# Patient Record
Sex: FEMALE | Race: OTHER | HISPANIC OR LATINO | Employment: OTHER | URBAN - METROPOLITAN AREA
[De-identification: names, ages, dates, MRNs, and addresses within clinical notes are randomized per-mention and may not be internally consistent; named-entity substitution may affect disease eponyms.]

---

## 2018-07-16 ENCOUNTER — OFFICE VISIT (OUTPATIENT)
Dept: OTHER | Facility: IMAGING CENTER | Age: 63
End: 2018-07-16
Payer: COMMERCIAL

## 2018-07-16 VITALS
HEIGHT: 66 IN | HEART RATE: 76 BPM | RESPIRATION RATE: 14 BRPM | WEIGHT: 151.24 LBS | OXYGEN SATURATION: 97 % | DIASTOLIC BLOOD PRESSURE: 68 MMHG | TEMPERATURE: 98.7 F | SYSTOLIC BLOOD PRESSURE: 118 MMHG | BODY MASS INDEX: 24.31 KG/M2

## 2018-07-16 DIAGNOSIS — E78.5 DYSLIPIDEMIA: ICD-10-CM

## 2018-07-16 DIAGNOSIS — E53.8 B12 DEFICIENCY: ICD-10-CM

## 2018-07-16 DIAGNOSIS — I83.92 VARICOSE VEINS OF LEFT LOWER EXTREMITY: ICD-10-CM

## 2018-07-16 DIAGNOSIS — Z83.3 FAMILY HISTORY OF DIABETES MELLITUS IN MOTHER: ICD-10-CM

## 2018-07-16 DIAGNOSIS — Z00.00 HEALTHCARE MAINTENANCE: ICD-10-CM

## 2018-07-16 DIAGNOSIS — E03.9 HYPOTHYROIDISM, UNSPECIFIED TYPE: ICD-10-CM

## 2018-07-16 DIAGNOSIS — E55.9 VITAMIN D DEFICIENCY: ICD-10-CM

## 2018-07-16 DIAGNOSIS — E89.40 POSTSURGICAL MENOPAUSE: ICD-10-CM

## 2018-07-16 DIAGNOSIS — R09.89 DECREASED PEDAL PULSES: ICD-10-CM

## 2018-07-16 DIAGNOSIS — Z79.890 CURRENT LONG-TERM USE OF POSTMENOPAUSAL HORMONE REPLACEMENT THERAPY: ICD-10-CM

## 2018-07-16 PROCEDURE — 99205 OFFICE O/P NEW HI 60 MIN: CPT | Performed by: INTERNAL MEDICINE

## 2018-07-16 RX ORDER — THYROID,PORK 90 MG
TABLET ORAL
Refills: 1 | COMMUNITY
Start: 2018-06-04 | End: 2018-07-16 | Stop reason: SDUPTHER

## 2018-07-16 RX ORDER — ESTRADIOL 0.07 MG/D
1 PATCH TRANSDERMAL
Qty: 4 PATCH | Refills: 0 | Status: SHIPPED | OUTPATIENT
Start: 2018-07-16 | End: 2018-08-19 | Stop reason: SDUPTHER

## 2018-07-16 RX ORDER — CYANOCOBALAMIN (VITAMIN B-12) 500 MCG
LOZENGE ORAL
COMMUNITY
End: 2023-11-03

## 2018-07-16 RX ORDER — CYANOCOBALAMIN 1000 UG/ML
1000 INJECTION, SOLUTION INTRAMUSCULAR; SUBCUTANEOUS
COMMUNITY
End: 2021-10-13

## 2018-07-16 RX ORDER — GINSENG 100 MG
CAPSULE ORAL
COMMUNITY
End: 2021-06-08

## 2018-07-16 RX ORDER — THYROID,PORK 90 MG
90 TABLET ORAL DAILY
Qty: 30 TAB | Refills: 1 | Status: SHIPPED | OUTPATIENT
Start: 2018-07-16 | End: 2018-12-26 | Stop reason: SDUPTHER

## 2018-07-16 RX ORDER — ESTRADIOL 0.1 MG/D
1 FILM, EXTENDED RELEASE TRANSDERMAL
COMMUNITY
End: 2018-07-16

## 2018-07-16 ASSESSMENT — PATIENT HEALTH QUESTIONNAIRE - PHQ9
5. POOR APPETITE OR OVEREATING: 3 - NEARLY EVERY DAY
SUM OF ALL RESPONSES TO PHQ QUESTIONS 1-9: 16
CLINICAL INTERPRETATION OF PHQ2 SCORE: 4

## 2018-07-16 ASSESSMENT — PAIN SCALES - GENERAL: PAINLEVEL: NO PAIN

## 2018-07-16 NOTE — ASSESSMENT & PLAN NOTE
There are no preventive care reminders to display for this patient.  Mammogram: 2017  PAP N/A (hysterectomy)  DEXA 3 yrs ago (remember it was normal)  Colonoscopy - may be due (review records)  Immunizations: zoster 8 yrs ago, annual flu    Lifestyle  Exercise: Outdoor walks w/ spouse/sister. Some hiking (just started). Healthy posture DVD (yoga/pilates) at home.  Diet: Avoids sweets due to family hx of DM2. Breakfast - 2 breakfast cookies w/ 2TB PB. Lunch - protein, fruit smoothie w/ rice or almond milk. Dinner Pizza + salad 1 wk/ chicken breast with veges/taco/turkey sandwhiches/chicken dakota salad/pastas. Snaks - chips/dips/fruits: apples, nectarines, cherries, mangoes. Beverates - water, tea, wine 1-2 w/ dinner.  Sleep: Sleeps well 5 of 7 nights. Wakes up feeling tired most mornings... Like she wants to sleep more. Doesn't go to bed early (before 10pm) but thinks she should. No snoring, takes 1-2 hr daytime naps when she is tired. Lower energy in the last year.  Relaxation: Hike/outdoor walks, oil painting, reading, work-out (not much motivations in the last year or so), travel.  Relationships:  Fairly non-stressful at home, spouse retired, they love to travel abroad. Plans to attend [employer] with sister regularly. Moved to [place] in May to be closer to her sister and brother-in-law - has been wonderful to see her often. Retired and enjoy free days to do what she pleases - she is thankful and grateful. Hopefully getting a dog this year.  Current Stressors:  not having energy that she once had, 10 lbs overweight, not having discipline to take better care of herself...not feeling motivated! Should get up earlier.  Prior experience with complementary therapies: Takes biotin, vit c w/ rosehips, vit D, vit E, B-complex w/ IF, Ca/Mg/Zinc, Glycine, Evening primrose oil, bilberry, zinc, ashwaganda. Bioidentical hormones.

## 2018-07-16 NOTE — ASSESSMENT & PLAN NOTE
Patient reports history of low vitamin D.  She is currently taking 5000 international units daily.

## 2018-07-16 NOTE — LETTER
[employer]  Alena Pelaez D.O.  [address] [facility] [address]  [place] [address]  Fax: [phone]   Authorization for Release/Disclosure of   Protected Health Information   Name: LIDIA DIOR : 1955 SSN: [id number]   Address: [address] [place] [address]  [place] [address] Phone:    [phone] (home)    I authorize the entity listed below to release/disclose the PHI below to:   [facility]/Alena Pelaez D.O. and Alena Pelaez D.O.   Provider or Entity Name:     Address   City, State, Zip   Phone:      Fax:     Reason for request: continuity of care   Information to be released:    [  ] LAST COLONOSCOPY,  including any PATH REPORT and follow-up  [  ] LAST FIT/COLOGUARD RESULT [  ] LAST DEXA  [  ] LAST MAMMOGRAM  [  ] LAST PAP  [  ] LAST LABS [  ] RETINA EXAM REPORT  [  ] IMMUNIZATION RECORDS  [ x] Release all info      [  ] Check here and initial the line next to each item to release ALL health information INCLUDING  _____ Care and treatment for drug and / or alcohol abuse  _____ HIV testing, infection status, or AIDS  _____ Genetic Testing    DATES OF SERVICE OR TIME PERIOD TO BE DISCLOSED: _____________  I understand and acknowledge that:  * This Authorization may be revoked at any time by you in writing, except if your health information has already been used or disclosed.  * Your health information that will be used or disclosed as a result of you signing this authorization could be re-disclosed by the recipient. If this occurs, your re-disclosed health information may no longer be protected by State or Federal laws.  * You may refuse to sign this Authorization. Your refusal will not affect your ability to obtain treatment.  * This Authorization becomes effective upon signing and will  on (date) __________.      If no date is indicated, this Authorization will  one (1) year from the signature date.    Name: Lidia Dior    Signature:   Date:     2018       PLEASE FAX  REQUESTED RECORDS BACK TO: [phone]

## 2018-07-16 NOTE — PROGRESS NOTES
Chief Complaint   Patient presents with   • Leg Pain     x 6 months; daily, tightness    • Pelvic Pain     x 6 months; aching daily, left side   • Depression   • Orders Needed     labs, dexa, US     Lidia Dior is a 62 y.o. female who usually sees No primary care provider on file. presents today to establish care at [facility] and to discuss leg pain, thyroid condition, hormone replacement.    HPI:  Hypothyroidism  Chronic condition (since 2008). Taking medicine as directed on empty stomach with water and waits at last 30 minutes to consume other food or beverage.  Denies palpitations, skin changes, temperature intolerance, changes in bowel habits. Thinning hair (runs in the family - mother), ridges on nails but not brittle. Currently taking [place] thyroid (since 2008). Has never been on synthetic T4. Family history in sister. Unknown if thyroid antibodies have been checked.    Healthcare maintenance  There are no preventive care reminders to display for this patient.  Mammogram: 2017  PAP N/A (hysterectomy)  DEXA 3 yrs ago (remember it was normal)  Colonoscopy - may be due (review records)  Immunizations: zoster 8 yrs ago, annual flu    Lifestyle  Exercise: Outdoor walks w/ spouse/sister. Some hiking (just started). Healthy posture DVD (yoga/pilates) at home.  Diet: Avoids sweets due to family hx of DM2. Breakfast - 2 breakfast cookies w/ 2TB PB. Lunch - protein, fruit smoothie w/ rice or almond milk. Dinner Pizza + salad 1 wk/ chicken breast with veges/taco/turkey sandwhiches/chicken dakota salad/pastas. Snaks - chips/dips/fruits: apples, nectarines, cherries, mangoes. Beverates - water, tea, wine 1-2 w/ dinner.  Sleep: Sleeps well 5 of 7 nights. Wakes up feeling tired most mornings... Like she wants to sleep more. Doesn't go to bed early (before 10pm) but thinks she should. No snoring, takes 1-2 hr daytime naps when she is tired. Lower energy in the last year.  Relaxation: Hike/outdoor walks, oil painting, reading,  work-out (not much motivations in the last year or so), travel.  Relationships:  Fairly non-stressful at home, spouse retired, they love to travel abroad. Plans to attend [employer] with sister regularly. Moved to [place] in May to be closer to her sister and brother-in-law - has been wonderful to see her often. Retired and enjoy free days to do what she pleases - she is thankful and grateful. Hopefully getting a dog this year.  Current Stressors:  not having energy that she once had, 10 lbs overweight, not having discipline to take better care of herself...not feeling motivated! Should get up earlier.  Prior experience with complementary therapies: Takes biotin, vit c w/ rosehips, vit D, vit E, B-complex w/ IF, Ca/Mg/Zinc, Glycine, Evening primrose oil, bilberry, zinc, ashwaganda. Bioidentical hormones.    Vitamin D deficiency  Patient reports history of low vitamin D.  She is currently taking 5000 international units daily.    B12 deficiency  Had been getting B12 injections for low vitamin B12 1000mcg/ml since 2016. Has some B12 in her B-complex vitamin. Some numbness in feet.     Postsurgical menopause  Patient had partial hysterectomy due to history of fibroids and menorrhagia.  She developed severe hot flashes and mood instability/irritability about 10 years ago.  Patient was started on bioidentical hormone therapy with improvement in her symptoms.  She notes that Dr. Burns had switched her to conventional estrogen patches about 8 years ago due to inability to normalize her lab values.  She is currently on estradiol 0.1 mg twice per week, progesterone 200 mg in the evening, and a compound testosterone cream. No personal history of breast cancer, CHD,  previous venous thromboembolic event or stroke, or active liver disease. Patient is a non-smoker.    Patient reports history of bilateral leg pain in the last few months - described as tightness and cold. Notes tops of her feet are occasionally numb. No swelling. No pain  with walking. Also has a slight darkening of the skin in her medial ankle.  Wears opened toed shoes/sandals at home. Hx of varicose veins and sclerotherapy of left lower extremity. Wears compression hose if on plane or sitting for long periods of time.     Current medicines (including changes today)  Current Outpatient Prescriptions   Medication Sig Dispense Refill   • Progesterone Micronized (PROGESTERONE PO) Take 200 mg by mouth every evening.     • EC-RX TESTOSTERONE TD Apply  to skin as directed.     • cyanocobalamin (VITAMIN B-12) 1000 MCG/ML Solution 1,000 mcg by Intramuscular route every 30 days.     • BIOTIN PO Take 600 mcg by mouth.     • Ascorbic Acid (VITAMIN C-CANDIDA HIPS) 1000 MG Tab Take  by mouth.     • Cholecalciferol (VITAMIN D-3) 5000 units Tab Take  by mouth.     • Vitamin E 400 UNIT Tab Take  by mouth.     • B Complex Vitamins (B COMPLEX PO) Take  by mouth.     • CALCIUM-MAGNESIUM-ZINC PO Take  by mouth.     • GLYCINE PO Take  by mouth.     • ADVANCED EVENING PRIMROSE OIL PO Take  by mouth.     • Bilberry, Vaccinium myrtillus, (BILBERRY PO) Take  by mouth.     • ASHWAGANDHA PO Take  by mouth.     • Zinc 50 MG Tab Take  by mouth.     • estradiol (CLIMARA) 0.075 MG/24HR PATCH WEEKLY Apply 1 Patch to skin as directed every 7 days. 4 Patch 0   • ARMOUR THYROID 90 MG Tab Take 1 Tab by mouth every day. 30 Tab 1     No current facility-administered medications for this visit.      She  has a past medical history of Dyslipidemia; Ovarian cyst (10/2005); Renal cyst (9/215); and Renal cyst (10/2008).  She  has a past surgical history that includes hysterectomy laparoscopy (10/1995); other orthopedic surgery (Right, 01/26/2001); abbey by laparoscopy (07/31/2003); and other surgical procedure (Left, 10/09/2006).  Social History   Substance Use Topics   • Smoking status: Never Smoker   • Smokeless tobacco: Never Used   • Alcohol use 1.2 oz/week     2 Glasses of wine per week      Comment: glass of wine 1-2x /wk  "    Family History   Problem Relation Age of Onset   • Diabetes Mother      Adult onset, on insulin   • Arthritis Mother      RA   • Heart Disease Father      CHF   • Diabetes Sister      borderline   • Thyroid Sister      hypothyroid   • Diabetes Sister      borderline   • Diabetes Sister    • Diabetes Sister    • Diabetes Sister    • Other Son      AV valve repair - possibly genetic   • Hypertension Son      No family status information on file.     Social History     Social History Narrative   • No narrative on file     ROS  Constitutional: Negative for fever, chills, weight loss and positive malaise/fatigue.   HENT: Negative for ear pain, nosebleeds, congestion, sore throat and neck pain.    Eyes: Negative for blurred vision.   Respiratory: Negative for cough, sputum production, shortness of breath and wheezing.    Cardiovascular: Negative for chest pain, palpitations, orthopnea and leg swelling. Varicose veins as per hpi.  Gastrointestinal: Negative for heartburn, nausea, vomiting and abdominal pain.   Genitourinary: Negative for dysuria, urgency and frequency.   Musculoskeletal: Negative for myalgias, back pain and joint pain.   Skin: Negative for rash and itching. Darkening of medial ankle skin.  Neurological: Negative for dizziness, tingling, tremors, sensory change, focal weakness and headaches.   Endo/Heme/Allergies: Does not bruise/bleed easily.   Psychiatric/Behavioral: Negative for depression, anxiety, or memory loss.     All other systems reviewed and are negative except as in HPI.     Objective:   Blood pressure 118/68, pulse 76, temperature 37.1 °C (98.7 °F), resp. rate 14, height 1.676 m (5' 6\"), weight 68.6 kg (151 lb 3.8 oz), SpO2 97 %. Body mass index is 24.41 kg/m².  Physical Exam:  Constitutional: Alert, no distress.  Skin: Warm, dry, good turgor, no rashes in visible areas.  Eye: Equal, round and reactive, conjunctiva clear, lids normal.  Neck: Trachea midline, no masses, no " thyromegaly.  Respiratory: Unlabored respiratory effort, lungs clear to auscultation, no wheezes, no ronchi.  Cardiovascular: Normal S1, S2, no murmur, no edema.  Ext: no edema, pedal pulses are diminished, feet are cold, no discoloration of toes - slight darkening of area on medial ankles. Unable to assess capillary refill - nails are painted.  Psych: Alert and oriented x3, normal affect and mood.    Assessment and Plan:   The following treatment plan was discussed  1. Hypothyroidism, unspecified type  Recheck thyroid labs. TPO/antithyroglobulin antibodies have not been checked and could affect future management. Discussed timing of lab and avoiding biotin which could affect lab results.  - TSH; Future  - FREE THYROXINE; Future  - T3 FREE; Future  - THYROID PEROXIDASE  (TPO) AB; Future  - ANTITHYROGLOBULIN AB; Future  - ARMOUR THYROID 90 MG Tab; Take 1 Tab by mouth every day.  Dispense: 30 Tab; Refill: 1    2. Postsurgical menopause  On hormone replacement therapy. Discussed considering discontinuation of therapy as duration of use is generally not more than five years or not beyond age 60 years. Discussed adverse risks associated with HRT and similarly with bioidentical hormones. Advise tapering down estradiol patch dosage and trial of discontinuing medication which has not been done yet. If she has symptoms during the taper, consider acupuncture to help manage them. Patient to use current 0.1mg patch once weekly instead of twice a week for 3-4 weeks prior to new lower dosage prescribed today. We will check with local compounding pharmacy regarding topical testosterone if indicated to continue.   - DS-BONE DENSITY STUDY (DEXA); Future  - estradiol (CLIMARA) 0.075 MG/24HR PATCH WEEKLY; Apply 1 Patch to skin as directed every 7 days.  Dispense: 4 Patch; Refill: 0  - ESTRADIOL; Future  - TESTOSTERONE, FREE AND TOTAL; Future  - DHEA SULFATE; Future  - PROGESTERONE; Future  - ESTRONE; Future  - DIHYDROTESTOSTERONE;  Future    3. Current long-term use of postmenopausal hormone replacement therapy  - estradiol (CLIMARA) 0.075 MG/24HR PATCH WEEKLY; Apply 1 Patch to skin as directed every 7 days.  Dispense: 4 Patch; Refill: 0  - ESTRADIOL; Future  - TESTOSTERONE, FREE AND TOTAL; Future  - DHEA SULFATE; Future  - PROGESTERONE; Future  - ESTRONE; Future  - DIHYDROTESTOSTERONE; Future    4. Decreased pedal pulses  Atypical leg pain with some risk factors and symptoms of peripheral arterial disease. Further recommendations pending lab/imaging results.  - US-ALON MULTI LEVEL BILAT; Future    5. Varicose veins of left lower extremity  Has symptoms likely associated with chronic venous disease. Confirm presence of venous reflux   with duplex venous ultrasound. Continue conservative therapies such as leg elevation, exercise.  Hold off on using compression hose until ALON done and found to be normal. Further recommendations pending lab/imaging results.  - US-EXTREMITY VENOUS BILATERAL LOWER    6. B12 deficiency  Recheck levels. Discussed about oral vs injection forms of B12 which can be both effective.   - VITAMIN B12; Future    7. Family history of diabetes mellitus in mother  History of impaired fasting sugars. Agree with monitoring sugar and carbohydrate intake.   - HEMOGLOBIN A1C; Future    8. Dyslipidemia  Past history of elevated cholesterol. Recheck labs.   - LIPID PROFILE; Future  - TSH; Future    9. Vitamin D deficiency  Has been low in the past.   - VITAMIN D,25 HYDROXY; Future    10. Healthcare maintenance  Discussed CDC recommendations for immunizations and USPSTF guidelines for screening exams.  Advised attending [employer] is [employer] lectures - intro lecture and class, 8, 9. Discussed   - COMP METABOLIC PANEL; Future  - CBC WITH DIFFERENTIAL; Future  - LIPID PROFILE; Future    Records requested.  Followup: Return in about 1 month (around 8/16/2018) for follow-up with PCP.    Spent 60 minutes in face-to-tace patient contact in which  greater than 50% of the visit was spent in counseling and coordination of care. We also reviewed PMH, family history, and each of her chronic diagnoses in regards to current management, specialty physicians, symptom control, and future planning.  Please refer to assessment and plan/discussion/recommendations for additional details.        I have worked with consultants from the vendor as well as technical experts from [facility] [employer] to optimize the interface. I have made every reasonable attempt to correct obvious errors, but I expect that there are errors of grammar and possibly content that I did not discover before finalizing the note.

## 2018-07-16 NOTE — LETTER
[employer]  Alena Pelaez D.O.  [address] [facility] [address]  [place] [address]  Fax: [phone]   Authorization for Release/Disclosure of   Protected Health Information   Name: LIDIA DIOR : 1955 SSN: [id number]   Address: [address] [place] [address]  [place] [address] Phone:    [phone] (home)    I authorize the entity listed below to release/disclose the PHI below to:   [facility]/Alena Pelaez D.O. and Alena Pelaez D.O.   Provider or Entity Name:     Address   City, State, Zip   Phone:      Fax:     Reason for request: continuity of care   Information to be released:    [  ] LAST COLONOSCOPY,  including any PATH REPORT and follow-up  [  ] LAST FIT/COLOGUARD RESULT [ x] LAST DEXA  [x] LAST MAMMOGRAM   x] LAST PAP  [x] LAST LABS [  ] RETINA EXAM REPORT  [  ] IMMUNIZATION RECORDS  [  ] Release all info      [  ] Check here and initial the line next to each item to release ALL health information INCLUDING  _____ Care and treatment for drug and / or alcohol abuse  _____ HIV testing, infection status, or AIDS  _____ Genetic Testing    DATES OF SERVICE OR TIME PERIOD TO BE DISCLOSED: _____________  I understand and acknowledge that:  * This Authorization may be revoked at any time by you in writing, except if your health information has already been used or disclosed.  * Your health information that will be used or disclosed as a result of you signing this authorization could be re-disclosed by the recipient. If this occurs, your re-disclosed health information may no longer be protected by State or Federal laws.  * You may refuse to sign this Authorization. Your refusal will not affect your ability to obtain treatment.  * This Authorization becomes effective upon signing and will  on (date) __________.      If no date is indicated, this Authorization will  one (1) year from the signature date.    Name: Lidia Dior    Signature:   Date:     2018       PLEASE FAX  REQUESTED RECORDS BACK TO: [phone]

## 2018-07-16 NOTE — ASSESSMENT & PLAN NOTE
Had been getting B12 injections for low vitamin B12 1000mcg/ml since 2016. Has some B12 in her B-complex vitamin. Some numbness in feet.

## 2018-07-16 NOTE — ASSESSMENT & PLAN NOTE
Patient had partial hysterectomy due to history of fibroids and menorrhagia.  She developed severe hot flashes and mood instability/irritability about 10 years ago.  Patient was started on bioidentical hormone therapy with improvement in her symptoms.  She notes that Dr. Burns had switched her to conventional estrogen patches about 8 years ago due to inability to normalize her lab values.  She is currently on estradiol 0.1 mg twice per week, progesterone 200 mg in the evening, and a compound testosterone cream. No personal history of breast cancer, CHD,  previous venous thromboembolic event or stroke, or active liver disease. Patient is a non-smoker.

## 2018-07-16 NOTE — ASSESSMENT & PLAN NOTE
Chronic condition (since 2008). Taking medicine as directed on empty stomach with water and waits at last 30 minutes to consume other food or beverage.  Denies palpitations, skin changes, temperature intolerance, changes in bowel habits. Thinning hair (runs in the family - mother), ridges on nails but not brittle. Currently taking [place] thyroid (since 2008). Has never been on synthetic T4. Family history in sister. Unknown if thyroid antibodies have been checked.

## 2018-07-17 ENCOUNTER — TELEPHONE (OUTPATIENT)
Dept: OTHER | Facility: IMAGING CENTER | Age: 63
End: 2018-07-17

## 2018-07-17 NOTE — TELEPHONE ENCOUNTER
Phone Number Called: [phone] (home)     Message: left voice message encouraging patient to check MyChart message or call the office to verbally review the message    Left Message for patient to call back: yes

## 2018-07-17 NOTE — TELEPHONE ENCOUNTER
----- Message from Alena Pelaez D.O. sent at 7/17/2018 11:57 AM PDT -----  Regarding: FW: labs and imaging   Please call patient to inform her of this [employer] message.   ----- Message -----  From: Alena Pelaez D.O.  Sent: 7/17/2018   5:00 AM  To: Lidia Dior  Subject: labs and imaging                                 Hi Lidia,  I added an additional ultrasound imaging test for the legs to look for venous reflux which can cause varicose veins and be causing the heaviness sensation and discoloration in your inner ankles. The other ALON test is looking for arterial disease - both I feel are recommended in your case. Avoid using the compression hose until these tests are done. You should elevate your legs for 30 minutes three or four times per day which can improves microcirculation in your legs.    For the labs - we talked about delaying your use of the thyroid medication until after labs - please also stop the supplements with biotin for at least 1 week before the labs are drawn as they could also potentially affect the antibody tests. There were also a few more hormone levels I had forgotten to add but they are there now ( will be able to pull them up in the computer).     Let me know if you have any questions,  Dr. Pelaez

## 2018-07-26 NOTE — TELEPHONE ENCOUNTER
Patient responded via MyChart:      From  Lidia Dior To  Alena Pelaez D.O. Sent  7/21/2018 10:33 AM   Thank you Dr. Pelaez. I will schedule next week.

## 2018-07-30 ENCOUNTER — APPOINTMENT (OUTPATIENT)
Dept: RADIOLOGY | Facility: MEDICAL CENTER | Age: 63
End: 2018-07-30
Attending: INTERNAL MEDICINE
Payer: COMMERCIAL

## 2018-07-31 ENCOUNTER — HOSPITAL ENCOUNTER (OUTPATIENT)
Dept: LAB | Facility: MEDICAL CENTER | Age: 63
End: 2018-07-31
Attending: INTERNAL MEDICINE
Payer: COMMERCIAL

## 2018-07-31 DIAGNOSIS — Z83.3 FAMILY HISTORY OF DIABETES MELLITUS IN MOTHER: ICD-10-CM

## 2018-07-31 DIAGNOSIS — E55.9 VITAMIN D DEFICIENCY: ICD-10-CM

## 2018-07-31 DIAGNOSIS — E78.5 DYSLIPIDEMIA: ICD-10-CM

## 2018-07-31 DIAGNOSIS — E53.8 B12 DEFICIENCY: ICD-10-CM

## 2018-07-31 DIAGNOSIS — Z79.890 CURRENT LONG-TERM USE OF POSTMENOPAUSAL HORMONE REPLACEMENT THERAPY: ICD-10-CM

## 2018-07-31 DIAGNOSIS — E03.9 HYPOTHYROIDISM, UNSPECIFIED TYPE: ICD-10-CM

## 2018-07-31 DIAGNOSIS — E89.40 POSTSURGICAL MENOPAUSE: ICD-10-CM

## 2018-07-31 DIAGNOSIS — Z00.00 HEALTHCARE MAINTENANCE: ICD-10-CM

## 2018-07-31 LAB
25(OH)D3 SERPL-MCNC: 33 NG/ML (ref 30–100)
ALBUMIN SERPL BCP-MCNC: 4.2 G/DL (ref 3.2–4.9)
ALBUMIN/GLOB SERPL: 1.8 G/DL
ALP SERPL-CCNC: 51 U/L (ref 30–99)
ALT SERPL-CCNC: 20 U/L (ref 2–50)
ANION GAP SERPL CALC-SCNC: 8 MMOL/L (ref 0–11.9)
AST SERPL-CCNC: 19 U/L (ref 12–45)
BASOPHILS # BLD AUTO: 1.3 % (ref 0–1.8)
BASOPHILS # BLD: 0.06 K/UL (ref 0–0.12)
BILIRUB SERPL-MCNC: 0.8 MG/DL (ref 0.1–1.5)
BUN SERPL-MCNC: 11 MG/DL (ref 8–22)
CALCIUM SERPL-MCNC: 8.8 MG/DL (ref 8.5–10.5)
CHLORIDE SERPL-SCNC: 107 MMOL/L (ref 96–112)
CHOLEST SERPL-MCNC: 191 MG/DL (ref 100–199)
CO2 SERPL-SCNC: 23 MMOL/L (ref 20–33)
CREAT SERPL-MCNC: 0.79 MG/DL (ref 0.5–1.4)
DHEA-S SERPL-MCNC: 34.8 UG/DL (ref 18.9–205)
EOSINOPHIL # BLD AUTO: 0.15 K/UL (ref 0–0.51)
EOSINOPHIL NFR BLD: 3.4 % (ref 0–6.9)
ERYTHROCYTE [DISTWIDTH] IN BLOOD BY AUTOMATED COUNT: 40.4 FL (ref 35.9–50)
ESTRADIOL SERPL-MCNC: <20 PG/ML
GLOBULIN SER CALC-MCNC: 2.3 G/DL (ref 1.9–3.5)
GLUCOSE SERPL-MCNC: 96 MG/DL (ref 65–99)
HCT VFR BLD AUTO: 41.8 % (ref 37–47)
HDLC SERPL-MCNC: 40 MG/DL
HGB BLD-MCNC: 15.1 G/DL (ref 12–16)
IMM GRANULOCYTES # BLD AUTO: 0.01 K/UL (ref 0–0.11)
IMM GRANULOCYTES NFR BLD AUTO: 0.2 % (ref 0–0.9)
LDLC SERPL CALC-MCNC: 115 MG/DL
LYMPHOCYTES # BLD AUTO: 1.2 K/UL (ref 1–4.8)
LYMPHOCYTES NFR BLD: 26.9 % (ref 22–41)
MCH RBC QN AUTO: 32.8 PG (ref 27–33)
MCHC RBC AUTO-ENTMCNC: 36.1 G/DL (ref 33.6–35)
MCV RBC AUTO: 90.7 FL (ref 81.4–97.8)
MONOCYTES # BLD AUTO: 0.32 K/UL (ref 0–0.85)
MONOCYTES NFR BLD AUTO: 7.2 % (ref 0–13.4)
NEUTROPHILS # BLD AUTO: 2.72 K/UL (ref 2–7.15)
NEUTROPHILS NFR BLD: 61 % (ref 44–72)
NRBC # BLD AUTO: 0 K/UL
NRBC BLD-RTO: 0 /100 WBC
PLATELET # BLD AUTO: 208 K/UL (ref 164–446)
PMV BLD AUTO: 11.2 FL (ref 9–12.9)
POTASSIUM SERPL-SCNC: 3.7 MMOL/L (ref 3.6–5.5)
PROGEST SERPL-MCNC: 11.13 NG/ML
PROT SERPL-MCNC: 6.5 G/DL (ref 6–8.2)
RBC # BLD AUTO: 4.61 M/UL (ref 4.2–5.4)
SODIUM SERPL-SCNC: 138 MMOL/L (ref 135–145)
T3FREE SERPL-MCNC: 3.2 PG/ML (ref 2.4–4.2)
T4 FREE SERPL-MCNC: 0.62 NG/DL (ref 0.53–1.43)
THYROPEROXIDASE AB SERPL-ACNC: 3.7 IU/ML (ref 0–9)
TRIGL SERPL-MCNC: 181 MG/DL (ref 0–149)
TSH SERPL DL<=0.005 MIU/L-ACNC: 3.43 UIU/ML (ref 0.38–5.33)
VIT B12 SERPL-MCNC: 514 PG/ML (ref 211–911)
WBC # BLD AUTO: 4.5 K/UL (ref 4.8–10.8)

## 2018-07-31 PROCEDURE — 83036 HEMOGLOBIN GLYCOSYLATED A1C: CPT

## 2018-07-31 PROCEDURE — 82607 VITAMIN B-12: CPT

## 2018-07-31 PROCEDURE — 84270 ASSAY OF SEX HORMONE GLOBUL: CPT

## 2018-07-31 PROCEDURE — 80053 COMPREHEN METABOLIC PANEL: CPT

## 2018-07-31 PROCEDURE — 86800 THYROGLOBULIN ANTIBODY: CPT

## 2018-07-31 PROCEDURE — 84481 FREE ASSAY (FT-3): CPT

## 2018-07-31 PROCEDURE — 82670 ASSAY OF TOTAL ESTRADIOL: CPT

## 2018-07-31 PROCEDURE — 82627 DEHYDROEPIANDROSTERONE: CPT

## 2018-07-31 PROCEDURE — 82542 COL CHROMOTOGRAPHY QUAL/QUAN: CPT

## 2018-07-31 PROCEDURE — 85025 COMPLETE CBC W/AUTO DIFF WBC: CPT

## 2018-07-31 PROCEDURE — 82679 ASSAY OF ESTRONE: CPT

## 2018-07-31 PROCEDURE — 80061 LIPID PANEL: CPT

## 2018-07-31 PROCEDURE — 84439 ASSAY OF FREE THYROXINE: CPT

## 2018-07-31 PROCEDURE — 36415 COLL VENOUS BLD VENIPUNCTURE: CPT

## 2018-07-31 PROCEDURE — 84144 ASSAY OF PROGESTERONE: CPT

## 2018-07-31 PROCEDURE — 84443 ASSAY THYROID STIM HORMONE: CPT

## 2018-07-31 PROCEDURE — 84403 ASSAY OF TOTAL TESTOSTERONE: CPT

## 2018-07-31 PROCEDURE — 82306 VITAMIN D 25 HYDROXY: CPT

## 2018-07-31 PROCEDURE — 86376 MICROSOMAL ANTIBODY EACH: CPT

## 2018-08-01 ENCOUNTER — HOSPITAL ENCOUNTER (OUTPATIENT)
Dept: RADIOLOGY | Facility: MEDICAL CENTER | Age: 63
End: 2018-08-01
Attending: INTERNAL MEDICINE
Payer: COMMERCIAL

## 2018-08-01 DIAGNOSIS — R09.89 DECREASED PEDAL PULSES: ICD-10-CM

## 2018-08-01 LAB
EST. AVERAGE GLUCOSE BLD GHB EST-MCNC: 103 MG/DL
HBA1C MFR BLD: 5.2 % (ref 0–5.6)
THYROGLOB AB SERPL-ACNC: <0.9 IU/ML (ref 0–4)

## 2018-08-01 PROCEDURE — 93922 UPR/L XTREMITY ART 2 LEVELS: CPT

## 2018-08-03 LAB
ANDROSTANOLONE SERPL-MCNC: 85 PG/ML (ref 24–208)
ESTRONE SERPL-MCNC: 9.8 PG/ML
SHBG SERPL-SCNC: 49 NMOL/L (ref 30–135)
TESTOST FREE SERPL-MCNC: 1.3 PG/ML (ref 0.6–3.8)
TESTOST SERPL-MCNC: 10 NG/DL (ref 5–32)

## 2018-08-10 ENCOUNTER — HOSPITAL ENCOUNTER (OUTPATIENT)
Dept: RADIOLOGY | Facility: MEDICAL CENTER | Age: 63
End: 2018-08-10
Attending: INTERNAL MEDICINE
Payer: COMMERCIAL

## 2018-08-10 DIAGNOSIS — R09.89 DECREASED PEDAL PULSES: ICD-10-CM

## 2018-08-10 DIAGNOSIS — I83.92 VARICOSE VEINS OF LEFT LOWER EXTREMITY: ICD-10-CM

## 2018-08-10 PROCEDURE — 93970 EXTREMITY STUDY: CPT

## 2018-08-15 ENCOUNTER — HOSPITAL ENCOUNTER (OUTPATIENT)
Dept: RADIOLOGY | Facility: MEDICAL CENTER | Age: 63
End: 2018-08-15
Attending: INTERNAL MEDICINE
Payer: COMMERCIAL

## 2018-08-15 DIAGNOSIS — E89.40 POSTSURGICAL MENOPAUSE: ICD-10-CM

## 2018-08-15 PROCEDURE — 77080 DXA BONE DENSITY AXIAL: CPT

## 2018-08-19 DIAGNOSIS — Z79.890 CURRENT LONG-TERM USE OF POSTMENOPAUSAL HORMONE REPLACEMENT THERAPY: ICD-10-CM

## 2018-08-19 DIAGNOSIS — E89.40 POSTSURGICAL MENOPAUSE: ICD-10-CM

## 2018-08-21 RX ORDER — ESTRADIOL 0.07 MG/D
1 PATCH TRANSDERMAL
Qty: 4 PATCH | Refills: 0 | Status: SHIPPED | OUTPATIENT
Start: 2018-08-21 | End: 2018-10-02

## 2018-10-01 ENCOUNTER — TELEPHONE (OUTPATIENT)
Dept: OTHER | Facility: IMAGING CENTER | Age: 63
End: 2018-10-01

## 2018-10-01 NOTE — TELEPHONE ENCOUNTER
----- Message -----   From: Lidia Dior   Sent: 10/1/2018   9:29 AM   To: [facility]   Subject: Appointment Request ()                           Appointment Request From: Lidia Dior      With Provider: Alena Pelaez D.O. [-Primary Care Physician-]      Preferred Date Range: From 10/1/2018 To 10/5/2018      Preferred Times: Any      Reason: To address the following health maintenance concerns.   Imm Dtap/Tdap/Td Vaccine   Colonoscopy   Imm Zoster Vaccines   Imm Influenza      Comments:     Called and left message for patient regarding appointment request made via [employer]. Let patient know Dr. Pelaez will be going out of town and has limited openings for today and tomorrow.

## 2018-10-02 ENCOUNTER — OFFICE VISIT (OUTPATIENT)
Dept: OTHER | Facility: IMAGING CENTER | Age: 63
End: 2018-10-02
Payer: COMMERCIAL

## 2018-10-02 VITALS
BODY MASS INDEX: 24.7 KG/M2 | WEIGHT: 153.66 LBS | DIASTOLIC BLOOD PRESSURE: 60 MMHG | SYSTOLIC BLOOD PRESSURE: 98 MMHG | RESPIRATION RATE: 12 BRPM | OXYGEN SATURATION: 99 % | HEIGHT: 66 IN | TEMPERATURE: 98 F | HEART RATE: 71 BPM

## 2018-10-02 DIAGNOSIS — Z23 NEED FOR INFLUENZA VACCINATION: ICD-10-CM

## 2018-10-02 DIAGNOSIS — E78.5 DYSLIPIDEMIA: ICD-10-CM

## 2018-10-02 DIAGNOSIS — I87.2 VENOUS REFLUX: ICD-10-CM

## 2018-10-02 DIAGNOSIS — Z23 NEED FOR TDAP VACCINATION: ICD-10-CM

## 2018-10-02 DIAGNOSIS — E03.9 HYPOTHYROIDISM, UNSPECIFIED TYPE: ICD-10-CM

## 2018-10-02 DIAGNOSIS — E53.8 B12 DEFICIENCY: ICD-10-CM

## 2018-10-02 DIAGNOSIS — Z79.890 CURRENT LONG-TERM USE OF POSTMENOPAUSAL HORMONE REPLACEMENT THERAPY: ICD-10-CM

## 2018-10-02 DIAGNOSIS — E89.40 POSTSURGICAL MENOPAUSE: ICD-10-CM

## 2018-10-02 DIAGNOSIS — E55.9 VITAMIN D DEFICIENCY: ICD-10-CM

## 2018-10-02 PROCEDURE — 90471 IMMUNIZATION ADMIN: CPT | Performed by: INTERNAL MEDICINE

## 2018-10-02 PROCEDURE — 90472 IMMUNIZATION ADMIN EACH ADD: CPT | Performed by: INTERNAL MEDICINE

## 2018-10-02 PROCEDURE — 90715 TDAP VACCINE 7 YRS/> IM: CPT | Performed by: INTERNAL MEDICINE

## 2018-10-02 PROCEDURE — 99215 OFFICE O/P EST HI 40 MIN: CPT | Mod: 25 | Performed by: INTERNAL MEDICINE

## 2018-10-02 PROCEDURE — 90686 IIV4 VACC NO PRSV 0.5 ML IM: CPT | Performed by: INTERNAL MEDICINE

## 2018-10-02 RX ORDER — ESTRADIOL 0.05 MG/D
1 PATCH TRANSDERMAL
Qty: 4 PATCH | Refills: 0 | Status: SHIPPED | OUTPATIENT
Start: 2018-10-02 | End: 2018-12-26 | Stop reason: SDUPTHER

## 2018-10-02 ASSESSMENT — PAIN SCALES - GENERAL: PAINLEVEL: NO PAIN

## 2018-10-02 NOTE — PROGRESS NOTES
Chief Complaint   Patient presents with   • Results   • Immunizations     flu and tdap     Subjective:   Lidia Dior is a 63 y.o. female here today for multiple problems as listed below.      B12 deficiency  Patient has been getting monthly B12 injections and has some B12 in B-complex vitamin. Reviewed results with patient.   Ref. Range 7/31/2018 08:52   Vitamin B12 -True Cobalamin Latest Ref Range: 211 - 911 pg/mL 514       Hypothyroidism  Chronic condition since 2008. Currently on [place] thyroid 1.5 grains - decided to start taking 1/2 a tablet daily. No hypothyroid symptoms including skin changes, temperature intolerance, changes in bowel habits. Labs reviewed.      Ref. Range 7/31/2018 08:52   TSH Latest Ref Range: 0.380 - 5.330 uIU/mL 3.430   Free T-4 Latest Ref Range: 0.53 - 1.43 ng/dL 0.62   T3,Free Latest Ref Range: 2.40 - 4.20 pg/mL 3.20      Ref. Range 7/31/2018 08:52   Microsomal -Tpo- Abs Latest Ref Range: 0.0 - 9.0 IU/mL 3.7   Anti-Thyroglobulin Latest Ref Range: 0.0 - 4.0 IU/mL <0.9       Postsurgical menopause  S/P hysterectomy in 2008 due to fibroids and menorrhagia. Patient has tapered down estradiol to 0.75mcg/wk from 0.1mg and continues to be on progesterone 200 nightly and compound testosterone cream - using AM.  Denies worsening of menopausal symptoms. She would like to eventually discontinue the medication.    Vitamin D deficiency  Has been taking D3 5000 IU/day after dinner but has now been out of for few months.  Lab Results   Component Value Date/Time    25HYDROXY 33 07/31/2018 08:52 AM         Dyslipidemia  Patients most recent cholesterol was reviewed. She continues to be on fish oil supplement. No significant dietary changes. Has not attended Food is medicine lectures. Working out at gym.   Lab Results   Component Value Date/Time    CHOLSTRLTOT 191 07/31/2018 08:52 AM     Lab Results   Component Value Date/Time     (H) 07/31/2018 08:52 AM     Lab Results   Component Value  Date/Time    HDL 40 07/31/2018 08:52 AM     Lab Results   Component Value Date/Time    TRIGLYCERIDE 181 (H) 07/31/2018 08:52 AM       Mammogram: 2017  PAP N/A (hysterectomy)  DEXA 3 yrs ago (remember it was normal)  Colonoscopy - may be due (review records)  Immunizations: zoster 8 yrs ago, annual flu  Due for tdap  DEXA done 8/25/18 - normal.      Ref. Range 7/31/2018 08:52 7/31/2018 08:53   8-y-Yupkwxxnhahgcqdcstt Latest Ref Range: 24.0 - 208.0 pg/mL  85.0   Dhea-S Latest Ref Range: 18.9 - 205.0 ug/dL  34.8   Estradiol-E2 Latest Units: pg/mL <20.0    Estrone Serum Latest Units: pg/mL  9.8   Progesterone Latest Units: ng/mL  11.13   Sex Hormone Bind Globulin Latest Ref Range: 30 - 135 nmol/L  49   Testosterone Fr LCMS Latest Ref Range: 0.6 - 3.8 pg/mL  1.3   Testosterone,Total Latest Ref Range: 5 - 32 ng/dL  10     Continues to have heaviness sensation in legs worse on left. Hx of varicose vein procedure.   8/1/2018 LE ART/ALON - normal BLE ABIs  8/10/2018 LE Venous Duplex -   LLE: Reflux of the greater saphenous vein at distal thigh and knee. Reflux demonstrated in a varicosity of the medial mid calf for 4.5 seconds. Reflux of the small saphenous vein for 4.5 seconds.    Current medicines (including changes today)  Current Outpatient Prescriptions   Medication Sig Dispense Refill   • Omega-3 Fatty Acids (FISH OIL PO) Take  by mouth.     • estradiol (CLIMARA) 0.05 MG/24HR PATCH WEEKLY Apply 1 Patch to skin as directed every 7 days. 4 Patch 0   • progesterone (PROMETRIUM) 200 MG capsule TAKE 1 CAP BY MOUTH AT BEDTIME  1   • EC-RX TESTOSTERONE TD Apply  to skin as directed.     • cyanocobalamin (VITAMIN B-12) 1000 MCG/ML Solution 1,000 mcg by Intramuscular route every 30 days.     • BIOTIN PO Take 600 mcg by mouth.     • Ascorbic Acid (VITAMIN C-CANDIDA HIPS) 1000 MG Tab Take  by mouth.     • Cholecalciferol (VITAMIN D-3) 5000 units Tab Take  by mouth.     • Vitamin E 400 UNIT Tab Take  by mouth.     • B Complex Vitamins  "(B COMPLEX PO) Take  by mouth.     • ADVANCED EVENING PRIMROSE OIL PO Take  by mouth.     • ARMOUR THYROID 90 MG Tab Take 1 Tab by mouth every day. 30 Tab 1   • CALCIUM-MAGNESIUM-ZINC PO Take  by mouth.     • GLYCINE PO Take  by mouth.     • Bilberry, Vaccinium myrtillus, (BILBERRY PO) Take  by mouth.     • ASHWAGANDHA PO Take  by mouth.     • Zinc 50 MG Tab Take  by mouth.       No current facility-administered medications for this visit.      She  has a past medical history of Dyslipidemia; Ovarian cyst (10/2005); Renal cyst (9/215); and Renal cyst (10/2008).    ROS   No chest pain, no shortness of breath, no abdominal pain, no diarrhea/constipation, no urinary symptoms.     Objective:     Blood pressure (!) 98/60, pulse 71, temperature 36.7 °C (98 °F), resp. rate 12, height 1.676 m (5' 6\"), weight 69.7 kg (153 lb 10.6 oz), SpO2 99 %. Body mass index is 24.8 kg/m².   Physical Exam:  Alert, oriented in no acute distress.  Eye contact is good, speech goal directed, affect calm  HEENT: conjunctiva non-injected, sclera non-icteric.  Neck: No adenopathy or masses in the neck or supraclavicular regions.  Lungs: clear to auscultation bilaterally with good excursion.  CV: regular rate and rhythm.  Ext: no edema, color normal, vascularity normal, temperature normal    Assessment and Plan:   The following treatment plan was discussed   1. Hypothyroidism, unspecified type   Has been on [place] 90 mcg 1/2 grain since last labs returned. Had advised rechecking labs since it has been over 8 wks since dosage change but she would like to recheck it later. As she has not been experiencing hypothyroid symptoms on lower dose, this is reasonable. TSH    FREE THYROXINE    T3 FREE   2. Dyslipidemia   Elevated LDL and TG. ASCVD 10 yr risk is 2.8% and does not place her in a statin benefit group at this time.  Goal LDL and TG discussed based on risks and history. We discussed an antiinflammatory/Mediterranean diet. I also recommended high " fiber and exercise to help further reduce their cholesterol. Continue fish oil.  LIPID PROFILE   3. Postsurgical menopause   No issues with slow taper of dosage on estradiol patch - decrease dosage to 0.5mg patch for next 4 weeks and advised her to update us about symptoms. Advise decreasing progesterone to every other night. Ok to continue testosterone cream for now based on normal labs.  estradiol (CLIMARA) 0.05 MG/24HR PATCH WEEKLY   4. Current long-term use of postmenopausal hormone replacement therapy     5. Venous reflux  Duplex venous ultrasound showing reflux >0.5s in small saphenous vein. Discussed conservative treatments including leg elevation and use of compression hose.    6. B12 deficiency   Stable on current dosage.    7. Vitamin D deficiency  Advised consistency with vitamin D3 supplementation     8. Need for influenza vaccination  Influenza Vaccine Quad Injection >3Y (PF)   9. Need for Tdap vaccination  Tdap =>8yo IM     Followup: Return in about 3 months (around 1/2/2019) for follow-up with PCP.    Spent 43 minutes in face-to-tace patient contact in which greater than 50% of the visit was spent in counseling and coordination of care detailed review of labs and imaging results, discussion about prevention of osteoporosis with adequate vitamin D3 and calcium intake, interval duration for screening DEXA scans, discussion about vaccination recommendations.  Please refer to assessment and plan/discussion/recommendations for additional details.        Please note that dictation has been dictated using voice recognition soft ware. I have made every reasonable attempt to correct obvious errors, but I expect that there are errors of grammar and possibly content that I did not discover before finalizing the note.

## 2018-10-03 PROBLEM — E78.5 DYSLIPIDEMIA: Status: ACTIVE | Noted: 2018-10-03

## 2018-10-03 NOTE — ASSESSMENT & PLAN NOTE
S/P hysterectomy in 2008 due to fibroids and menorrhagia. Patient has tapered down estradiol to 0.75mcg/wk from 0.1mg and continues to be on progesterone 200 nightly and compound testosterone cream - using AM.  Denies worsening of menopausal symptoms. She would like to eventually discontinue the medication.

## 2018-10-03 NOTE — ASSESSMENT & PLAN NOTE
Has been taking D3 5000 IU/day after dinner but has now been out of for few months.  Lab Results   Component Value Date/Time    25HYDROXY 33 07/31/2018 08:52 AM

## 2018-10-03 NOTE — ASSESSMENT & PLAN NOTE
Patient has been getting monthly B12 injections and has some B12 in B-complex vitamin. Reviewed results with patient.   Ref. Range 7/31/2018 08:52   Vitamin B12 -True Cobalamin Latest Ref Range: 211 - 911 pg/mL 514

## 2018-10-03 NOTE — ASSESSMENT & PLAN NOTE
Chronic condition since 2008. Currently on [place] thyroid 1.5 grains - decided to start taking 1/2 a tablet daily. No hypothyroid symptoms including skin changes, temperature intolerance, changes in bowel habits. Labs reviewed.      Ref. Range 7/31/2018 08:52   TSH Latest Ref Range: 0.380 - 5.330 uIU/mL 3.430   Free T-4 Latest Ref Range: 0.53 - 1.43 ng/dL 0.62   T3,Free Latest Ref Range: 2.40 - 4.20 pg/mL 3.20      Ref. Range 7/31/2018 08:52   Microsomal -Tpo- Abs Latest Ref Range: 0.0 - 9.0 IU/mL 3.7   Anti-Thyroglobulin Latest Ref Range: 0.0 - 4.0 IU/mL <0.9

## 2018-10-03 NOTE — ASSESSMENT & PLAN NOTE
Patients most recent cholesterol was reviewed. She continues to be on fish oil supplement. No significant dietary changes. Has not attended Food is medicine lectures. Working out at gym.   Lab Results   Component Value Date/Time    CHOLSTRLTOT 191 07/31/2018 08:52 AM     Lab Results   Component Value Date/Time     (H) 07/31/2018 08:52 AM     Lab Results   Component Value Date/Time    HDL 40 07/31/2018 08:52 AM     Lab Results   Component Value Date/Time    TRIGLYCERIDE 181 (H) 07/31/2018 08:52 AM

## 2018-10-03 NOTE — ASSESSMENT & PLAN NOTE
S/P hysterectomy in 2008. Patient has tapered down estradiol to 0.75mcg/wk and continues to be on progesterone 200 nightly. Denies worsening of menopausal symptoms. She would like to eventually discontinue the medication.

## 2018-11-04 ENCOUNTER — OFFICE VISIT (OUTPATIENT)
Dept: URGENT CARE | Facility: CLINIC | Age: 63
End: 2018-11-04
Payer: COMMERCIAL

## 2018-11-04 VITALS
DIASTOLIC BLOOD PRESSURE: 70 MMHG | SYSTOLIC BLOOD PRESSURE: 110 MMHG | HEART RATE: 78 BPM | RESPIRATION RATE: 20 BRPM | BODY MASS INDEX: 24.43 KG/M2 | TEMPERATURE: 98.6 F | OXYGEN SATURATION: 97 % | HEIGHT: 66 IN | WEIGHT: 152 LBS

## 2018-11-04 DIAGNOSIS — R05.9 COUGH: ICD-10-CM

## 2018-11-04 DIAGNOSIS — J06.9 ACUTE URI: ICD-10-CM

## 2018-11-04 PROCEDURE — 99214 OFFICE O/P EST MOD 30 MIN: CPT | Performed by: NURSE PRACTITIONER

## 2018-11-04 RX ORDER — AMOXICILLIN AND CLAVULANATE POTASSIUM 875; 125 MG/1; MG/1
1 TABLET, FILM COATED ORAL 2 TIMES DAILY
Qty: 20 TAB | Refills: 0 | Status: SHIPPED | OUTPATIENT
Start: 2018-11-04 | End: 2018-11-14

## 2018-11-04 RX ORDER — CODEINE PHOSPHATE AND GUAIFENESIN 10; 100 MG/5ML; MG/5ML
5 SOLUTION ORAL EVERY 6 HOURS PRN
Qty: 120 ML | Refills: 0 | Status: SHIPPED | OUTPATIENT
Start: 2018-11-04 | End: 2018-11-11

## 2018-11-04 ASSESSMENT — ENCOUNTER SYMPTOMS
FEVER: 0
RHINORRHEA: 1
CHILLS: 0
SINUS PAIN: 1
COUGH: 1

## 2018-11-04 NOTE — PROGRESS NOTES
Subjective:      Lidia Dior is a 63 y.o. female who presents with Cough (Few days dry cough , sorethroat and chest congestion  )    Past Medical History:   Diagnosis Date   • Dyslipidemia    • Ovarian cyst 10/2005    Right.   • Renal cyst 9/215    Right - stable, possible stone   • Renal cyst 10/2008    Left, simple 1.6cm. During ovarian US     Social History     Social History   • Marital status:      Spouse name: N/A   • Number of children: 1   • Years of education: N/A     Occupational History   • Not on file.     Social History Main Topics   • Smoking status: Never Smoker   • Smokeless tobacco: Never Used   • Alcohol use 1.2 oz/week     2 Glasses of wine per week      Comment: glass of wine 1-2x /wk   • Drug use: Unknown   • Sexual activity: Not on file     Other Topics Concern   • Not on file     Social History Narrative   • No narrative on file     Family History   Problem Relation Age of Onset   • Diabetes Mother         Adult onset, on insulin   • Arthritis Mother         RA   • Heart Disease Father         CHF   • Diabetes Sister         borderline   • Thyroid Sister         hypothyroid   • Diabetes Sister         borderline   • Diabetes Sister    • Diabetes Sister    • Diabetes Sister    • Other Son         AV valve repair - possibly genetic   • Hypertension Son        Allergies: Asa [aspirin]            Cough   This is a new problem. The current episode started in the past 7 days. The problem has been gradually worsening. The problem occurs every few minutes. The cough is non-productive. Associated symptoms include nasal congestion, postnasal drip and rhinorrhea. Pertinent negatives include no chills or fever. Associated symptoms comments: Sinus pain and pressure . Nothing aggravates the symptoms. She has tried nothing for the symptoms. The treatment provided no relief.       Review of Systems   Constitutional: Negative for chills and fever.   HENT: Positive for congestion, postnasal  "drip, rhinorrhea and sinus pain.    Respiratory: Positive for cough.    Skin: Negative.    All other systems reviewed and are negative.         Objective:     /70 (BP Location: Left arm, Patient Position: Sitting, BP Cuff Size: Adult)   Pulse 78   Temp 37 °C (98.6 °F) (Temporal)   Resp 20   Ht 1.676 m (5' 6\")   Wt 68.9 kg (152 lb)   SpO2 97%   BMI 24.53 kg/m²      Physical Exam   Constitutional: She is oriented to person, place, and time. She appears well-developed and well-nourished.   HENT:   Head: Normocephalic.   Right Ear: External ear normal.   Left Ear: External ear normal.   Clear PND  Tender over the maxillary sinuses     Eyes: Pupils are equal, round, and reactive to light. EOM are normal.   Neck: Normal range of motion. Neck supple.   Cardiovascular: Normal rate and regular rhythm.    Pulmonary/Chest: Effort normal and breath sounds normal.   Musculoskeletal: Normal range of motion.   Neurological: She is alert and oriented to person, place, and time.   Skin: Skin is warm and dry. Capillary refill takes less than 2 seconds.   Psychiatric: She has a normal mood and affect. Her behavior is normal. Thought content normal.   Vitals reviewed.    Discussed with patient at length that I believe she has a viral upper respiratory infection at this time.  As she has a history of sinus infections, I have agreed to provide her with a written prescription for Augmentin with the understanding that she will not start this unless she starts to produce purulent drainage from her sinuses.  Patient verbalized understanding and agreement.  She is also requesting something today for cough.          Assessment/Plan:     1. Cough    - guaifenesin-codeine (CHERATUSSIN AC) Solution oral solution; Take 5 mL by mouth every 6 hours as needed for up to 7 days.  Dispense: 120 mL; Refill: 0; checked patient's  and find no evidence of narcotic misuse.  Clearly stated no driving or alcohol with this medication.    2. " Acute URI    - amoxicillin-clavulanate (AUGMENTIN) 875-125 MG Tab; Take 1 Tab by mouth 2 times a day for 10 days.  Dispense: 20 Tab; Refill: 0; start only for production of purulent drainage  -Humidifier  -Flonase  -Follow-up for persistent or worsening of symptoms

## 2018-12-26 DIAGNOSIS — E03.9 HYPOTHYROIDISM, UNSPECIFIED TYPE: ICD-10-CM

## 2018-12-26 DIAGNOSIS — E89.40 POSTSURGICAL MENOPAUSE: ICD-10-CM

## 2018-12-27 RX ORDER — THYROID,PORK 90 MG
TABLET ORAL
Qty: 90 TAB | Refills: 0 | Status: SHIPPED | OUTPATIENT
Start: 2018-12-27 | End: 2019-03-25 | Stop reason: SDUPTHER

## 2018-12-27 RX ORDER — ESTRADIOL 0.05 MG/D
1 PATCH TRANSDERMAL
Qty: 13 PATCH | Refills: 0 | Status: SHIPPED | OUTPATIENT
Start: 2018-12-27 | End: 2019-03-19 | Stop reason: SDUPTHER

## 2019-03-19 DIAGNOSIS — E89.40 POSTSURGICAL MENOPAUSE: ICD-10-CM

## 2019-03-19 RX ORDER — ESTRADIOL 0.05 MG/D
1 PATCH TRANSDERMAL
Qty: 13 PATCH | Refills: 0 | Status: SHIPPED | OUTPATIENT
Start: 2019-03-19 | End: 2019-06-26 | Stop reason: SDUPTHER

## 2019-03-25 DIAGNOSIS — E03.9 HYPOTHYROIDISM, UNSPECIFIED TYPE: ICD-10-CM

## 2019-03-25 RX ORDER — THYROID,PORK 90 MG
90 TABLET ORAL
Qty: 90 TAB | Refills: 0 | Status: SHIPPED | OUTPATIENT
Start: 2019-03-25 | End: 2019-06-24 | Stop reason: SDUPTHER

## 2019-06-13 NOTE — TELEPHONE ENCOUNTER
Was the patient seen in the last year in this department? No     Does patient have an active prescription for medications requested? No     Received Request Via: Pharmacy      Pt met protocol?: No ,pt has been seen 7/2018 will forward     
Good

## 2019-06-24 DIAGNOSIS — E55.9 VITAMIN D DEFICIENCY: ICD-10-CM

## 2019-06-24 DIAGNOSIS — E03.9 HYPOTHYROIDISM, UNSPECIFIED TYPE: ICD-10-CM

## 2019-06-24 DIAGNOSIS — R79.89 ABNORMAL CBC: ICD-10-CM

## 2019-06-24 DIAGNOSIS — E53.8 B12 DEFICIENCY: ICD-10-CM

## 2019-06-24 DIAGNOSIS — E78.5 DYSLIPIDEMIA: ICD-10-CM

## 2019-06-24 DIAGNOSIS — Z00.00 HEALTHCARE MAINTENANCE: ICD-10-CM

## 2019-06-26 DIAGNOSIS — E89.40 POSTSURGICAL MENOPAUSE: ICD-10-CM

## 2019-06-26 RX ORDER — THYROID,PORK 90 MG
90 TABLET ORAL
Qty: 90 TAB | Refills: 0 | Status: SHIPPED | OUTPATIENT
Start: 2019-06-26 | End: 2019-10-15 | Stop reason: SDUPTHER

## 2019-06-27 RX ORDER — ESTRADIOL 0.05 MG/D
1 PATCH TRANSDERMAL
Qty: 12 PATCH | Refills: 1 | Status: SHIPPED | OUTPATIENT
Start: 2019-06-27 | End: 2019-10-28

## 2019-06-27 NOTE — TELEPHONE ENCOUNTER
Pt notified of fasting lab work. Pt states she will call to schedule follow up with Dr. Pelaez after lab work is completed.

## 2019-07-02 ENCOUNTER — OFFICE VISIT (OUTPATIENT)
Dept: MEDICAL GROUP | Facility: IMAGING CENTER | Age: 64
End: 2019-07-02
Payer: COMMERCIAL

## 2019-07-02 VITALS
OXYGEN SATURATION: 97 % | RESPIRATION RATE: 14 BRPM | WEIGHT: 142.6 LBS | TEMPERATURE: 97.5 F | HEART RATE: 65 BPM | SYSTOLIC BLOOD PRESSURE: 104 MMHG | BODY MASS INDEX: 22.92 KG/M2 | DIASTOLIC BLOOD PRESSURE: 72 MMHG | HEIGHT: 66 IN

## 2019-07-02 DIAGNOSIS — F43.29 STRESS AND ADJUSTMENT REACTION: ICD-10-CM

## 2019-07-02 DIAGNOSIS — M54.2 CERVICALGIA: ICD-10-CM

## 2019-07-02 DIAGNOSIS — M25.512 ACUTE PAIN OF LEFT SHOULDER: ICD-10-CM

## 2019-07-02 PROCEDURE — 99214 OFFICE O/P EST MOD 30 MIN: CPT | Performed by: INTERNAL MEDICINE

## 2019-07-02 ASSESSMENT — PATIENT HEALTH QUESTIONNAIRE - PHQ9
5. POOR APPETITE OR OVEREATING: 0 - NOT AT ALL
SUM OF ALL RESPONSES TO PHQ QUESTIONS 1-9: 3
CLINICAL INTERPRETATION OF PHQ2 SCORE: 1
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
## 2019-07-18 ENCOUNTER — OFFICE VISIT (OUTPATIENT)
Dept: MEDICAL GROUP | Facility: IMAGING CENTER | Age: 64
End: 2019-07-18

## 2019-07-25 ENCOUNTER — OFFICE VISIT (OUTPATIENT)
Dept: MEDICAL GROUP | Facility: IMAGING CENTER | Age: 64
End: 2019-07-25
Payer: COMMERCIAL

## 2019-07-25 VITALS
RESPIRATION RATE: 14 BRPM | WEIGHT: 146.2 LBS | HEART RATE: 69 BPM | TEMPERATURE: 98.4 F | HEIGHT: 66 IN | SYSTOLIC BLOOD PRESSURE: 120 MMHG | BODY MASS INDEX: 23.5 KG/M2 | OXYGEN SATURATION: 96 % | DIASTOLIC BLOOD PRESSURE: 76 MMHG

## 2019-07-25 DIAGNOSIS — L03.114 CELLULITIS OF LEFT UPPER EXTREMITY: ICD-10-CM

## 2019-07-25 DIAGNOSIS — F43.29 STRESS AND ADJUSTMENT REACTION: ICD-10-CM

## 2019-07-25 PROCEDURE — 99214 OFFICE O/P EST MOD 30 MIN: CPT | Performed by: INTERNAL MEDICINE

## 2019-07-25 RX ORDER — AMOXICILLIN 500 MG/1
500 CAPSULE ORAL 3 TIMES DAILY
Qty: 15 CAP | Refills: 0 | Status: SHIPPED | OUTPATIENT
Start: 2019-07-25 | End: 2019-07-30

## 2019-07-25 NOTE — PATIENT INSTRUCTIONS
Cellulitis, Adult  Cellulitis is a skin infection. The infected area is usually red and tender. This condition occurs most often in the arms and lower legs. The infection can travel to the muscles, blood, and underlying tissue and become serious. It is very important to get treated for this condition.  What are the causes?  Cellulitis is caused by bacteria. The bacteria enter through a break in the skin, such as a cut, burn, insect bite, open sore, or crack.  What increases the risk?  This condition is more likely to occur in people who:  · Have a weak defense system (immune system).  · Have open wounds on the skin such as cuts, burns, bites, and scrapes. Bacteria can enter the body through these open wounds.  · Are older.  · Have diabetes.  · Have a type of long-lasting (chronic) liver disease (cirrhosis) or kidney disease.  · Use IV drugs.  What are the signs or symptoms?  Symptoms of this condition include:  · Redness, streaking, or spotting on the skin.  · Swollen area of the skin.  · Tenderness or pain when an area of the skin is touched.  · Warm skin.  · Fever.  · Chills.  · Blisters.  How is this diagnosed?  This condition is diagnosed based on a medical history and physical exam. You may also have tests, including:  · Blood tests.  · Lab tests.  · Imaging tests.  How is this treated?  Treatment for this condition may include:  · Medicines, such as antibiotic medicines or antihistamines.  · Supportive care, such as rest and application of cold or warm cloths (cold or warm compresses) to the skin.  · Hospital care, if the condition is severe.  The infection usually gets better within 1-2 days of treatment.  Follow these instructions at home:  · Take over-the-counter and prescription medicines only as told by your health care provider.  · If you were prescribed an antibiotic medicine, take it as told by your health care provider. Do not stop taking the antibiotic even if you start to feel better.  · Drink  enough fluid to keep your urine clear or pale yellow.  · Do not touch or rub the infected area.  · Raise (elevate) the infected area above the level of your heart while you are sitting or lying down.  · Apply warm or cold compresses to the affected area as told by your health care provider.  · Keep all follow-up visits as told by your health care provider. This is important. These visits let your health care provider make sure a more serious infection is not developing.  Contact a health care provider if:  · You have a fever.  · Your symptoms do not improve within 1-2 days of starting treatment.  · Your bone or joint underneath the infected area becomes painful after the skin has healed.  · Your infection returns in the same area or another area.  · You notice a swollen bump in the infected area.  · You develop new symptoms.  · You have a general ill feeling (malaise) with muscle aches and pains.  Get help right away if:  · Your symptoms get worse.  · You feel very sleepy.  · You develop vomiting or diarrhea that persists.  · You notice red streaks coming from the infected area.  · Your red area gets larger or turns dark in color.  This information is not intended to replace advice given to you by your health care provider. Make sure you discuss any questions you have with your health care provider.  Document Released: 09/27/2006 Document Revised: 04/27/2017 Document Reviewed: 10/26/2016  [employer] Interactive Patient Education © 2017 [employer]

## 2019-07-25 NOTE — PROGRESS NOTES
Chief Complaint   Patient presents with   • Spider Bite     Tuesday night, left wrist, some swelling adn itching      Subjective:   Lidia Dior is a 63 y.o. female here today for multiple problems as listed below.      New Problem. Patient describes insect bite - felt a bite but did not see what bit her. Thinks it may be a spider. Was outdoors doing yard work. Lives near [facility] in [facility] [place]. Has mosquito bites on right arm which appear different.  Onset of event: Tuesday night, next day red and swollen.   Provocation/Palliation: none.   Quality: pruritic and edematous, tender  Region/Radiation: left wrist.   Severity: No change over the last day.   Treatments: Washed wound and placed ice and some topical hydrocortisone.  Pertinent ROS: No hx of MRSA. No drainage, no visible puncture wound, no fever/chills.     Stress levels are improving.    Current medicines (including changes today)  Current Outpatient Prescriptions   Medication Sig Dispense Refill   • amoxicillin (AMOXIL) 500 MG Cap Take 1 Cap by mouth 3 times a day for 5 days. 15 Cap 0   • progesterone (PROMETRIUM) 200 MG capsule TAKE 1 CAPSULE BY MOUTH EVERYDAY AT BEDTIME 90 Cap 0   • estradiol (CLIMARA) 0.05 MG/24HR PATCH WEEKLY APPLY 1 PATCH TO SKIN AS DIRECTED EVERY 7 DAYS. 12 Patch 1   • ARMOUR THYROID 90 MG Tab Take 1 Tab by mouth Every morning on an empty stomach. 90 Tab 0   • Omega-3 Fatty Acids (FISH OIL PO) Take  by mouth.     • cyanocobalamin (VITAMIN B-12) 1000 MCG/ML Solution 1,000 mcg by Intramuscular route every 30 days.     • BIOTIN PO Take 600 mcg by mouth.     • Ascorbic Acid (VITAMIN C-CANDIDA HIPS) 1000 MG Tab Take  by mouth.     • Cholecalciferol (VITAMIN D-3) 5000 units Tab Take  by mouth.     • Vitamin E 400 UNIT Tab Take  by mouth.     • B Complex Vitamins (B COMPLEX PO) Take  by mouth.     • CALCIUM-MAGNESIUM-ZINC PO Take  by mouth.     • EC-RX TESTOSTERONE TD Apply  to skin as directed.     • GLYCINE PO Take  by  "mouth.     • ADVANCED EVENING PRIMROSE OIL PO Take  by mouth.     • Bilberry, Vaccinium myrtillus, (BILBERRY PO) Take  by mouth.     • ASHWAGANDHA PO Take  by mouth.     • Zinc 50 MG Tab Take  by mouth.       No current facility-administered medications for this visit.      She  has a past medical history of Dyslipidemia; Ovarian cyst (10/2005); Renal cyst (9/215); and Renal cyst (10/2008).    ROS   No chest pain, no shortness of breath, no abdominal pain, no diarrhea/constipation, no urinary symptoms.     Objective:     /76 (BP Location: Left arm, Patient Position: Sitting, BP Cuff Size: Adult)   Pulse 69   Temp 36.9 °C (98.4 °F) (Temporal)   Resp 14   Ht 1.676 m (5' 6\")   Wt 66.3 kg (146 lb 3.2 oz)   SpO2 96%  Body mass index is 23.6 kg/m².   Physical Exam:  Alert, oriented in no acute distress.  Eye contact is good, speech goal directed, affect calm  HEENT: conjunctiva non-injected, sclera non-icteric.  Pinna normal.  Neck: No adenopathy or masses in the neck or supraclavicular regions.  Lungs: clear to auscultation bilaterally with good excursion.  CV: regular rate and rhythm.  Ext: left lateral and dorsal wrist diffuse erythema, no puncture wound, non-fluctuant, mildly edematous.    Assessment and Plan:   The following treatment plan was discussed  1. Cellulitis of left upper extremity  amoxicillin (AMOXIL) 500 MG Cap   2. Stress and adjustment reaction       1. Presumed insect bite. Nonpurulent without abscess or portal of entry. Start empiric antibiotics covering for beta-hemolytic strep. No additional risk factors for MRSA. Continue to monitor lesion for improvement within 48 hours. Discussed additional conservative topical care. Discussed use of vitamin C and probiotics.   2. Improved. Patient is not pursuing separation or divorce at this time.  will be attending counseling. Patient feels safe in her home environment.   -Has lab orders on file, advised her to get drawn.    Followup: " Return in about 1 week (around 8/1/2019), or if symptoms worsen or fail to improve, for follow-up with PCP.    Spent 25 minutes in face-to-tace patient contact in which greater than 50% of the visit was spent in counseling and coordination of care including patient education on medication and usage. Please refer to assessment and plan/discussion/recommendations for additional details.        Please note that dictation has been dictated using voice recognition soft ware. I have made every reasonable attempt to correct obvious errors, but I expect that there are errors of grammar and possibly content that I did not discover before finalizing the note.

## 2019-09-04 ENCOUNTER — TELEPHONE (OUTPATIENT)
Dept: MEDICAL GROUP | Facility: IMAGING CENTER | Age: 64
End: 2019-09-04

## 2019-09-04 NOTE — TELEPHONE ENCOUNTER
Received message from patient requesting lab orders. She would like to have lab work done for Dr. Pelaez to review before she no longer is with the practice.

## 2019-09-17 ENCOUNTER — HOSPITAL ENCOUNTER (OUTPATIENT)
Dept: LAB | Facility: MEDICAL CENTER | Age: 64
End: 2019-09-17
Attending: INTERNAL MEDICINE
Payer: COMMERCIAL

## 2019-09-17 DIAGNOSIS — R79.89 ABNORMAL CBC: ICD-10-CM

## 2019-09-17 DIAGNOSIS — E78.5 DYSLIPIDEMIA: ICD-10-CM

## 2019-09-17 DIAGNOSIS — E55.9 VITAMIN D DEFICIENCY: ICD-10-CM

## 2019-09-17 DIAGNOSIS — E03.9 HYPOTHYROIDISM, UNSPECIFIED TYPE: ICD-10-CM

## 2019-09-17 DIAGNOSIS — E53.8 B12 DEFICIENCY: ICD-10-CM

## 2019-09-17 DIAGNOSIS — Z00.00 HEALTHCARE MAINTENANCE: ICD-10-CM

## 2019-09-17 LAB
25(OH)D3 SERPL-MCNC: 35 NG/ML (ref 30–100)
ALBUMIN SERPL BCP-MCNC: 4.6 G/DL (ref 3.2–4.9)
ALBUMIN/GLOB SERPL: 1.8 G/DL
ALP SERPL-CCNC: 64 U/L (ref 30–99)
ALT SERPL-CCNC: 17 U/L (ref 2–50)
ANION GAP SERPL CALC-SCNC: 9 MMOL/L (ref 0–11.9)
AST SERPL-CCNC: 22 U/L (ref 12–45)
BASOPHILS # BLD AUTO: 1.5 % (ref 0–1.8)
BASOPHILS # BLD: 0.06 K/UL (ref 0–0.12)
BILIRUB SERPL-MCNC: 0.8 MG/DL (ref 0.1–1.5)
BUN SERPL-MCNC: 10 MG/DL (ref 8–22)
CALCIUM SERPL-MCNC: 9.6 MG/DL (ref 8.5–10.5)
CHLORIDE SERPL-SCNC: 103 MMOL/L (ref 96–112)
CHOLEST SERPL-MCNC: 211 MG/DL (ref 100–199)
CO2 SERPL-SCNC: 27 MMOL/L (ref 20–33)
CREAT SERPL-MCNC: 0.85 MG/DL (ref 0.5–1.4)
EOSINOPHIL # BLD AUTO: 0.18 K/UL (ref 0–0.51)
EOSINOPHIL NFR BLD: 4.4 % (ref 0–6.9)
ERYTHROCYTE [DISTWIDTH] IN BLOOD BY AUTOMATED COUNT: 41.9 FL (ref 35.9–50)
FASTING STATUS PATIENT QL REPORTED: NORMAL
GLOBULIN SER CALC-MCNC: 2.6 G/DL (ref 1.9–3.5)
GLUCOSE SERPL-MCNC: 95 MG/DL (ref 65–99)
HCT VFR BLD AUTO: 45.2 % (ref 37–47)
HDLC SERPL-MCNC: 50 MG/DL
HGB BLD-MCNC: 15.1 G/DL (ref 12–16)
IMM GRANULOCYTES # BLD AUTO: 0 K/UL (ref 0–0.11)
IMM GRANULOCYTES NFR BLD AUTO: 0 % (ref 0–0.9)
LDLC SERPL CALC-MCNC: 130 MG/DL
LYMPHOCYTES # BLD AUTO: 1.2 K/UL (ref 1–4.8)
LYMPHOCYTES NFR BLD: 29.3 % (ref 22–41)
MCH RBC QN AUTO: 31.3 PG (ref 27–33)
MCHC RBC AUTO-ENTMCNC: 33.4 G/DL (ref 33.6–35)
MCV RBC AUTO: 93.8 FL (ref 81.4–97.8)
MONOCYTES # BLD AUTO: 0.33 K/UL (ref 0–0.85)
MONOCYTES NFR BLD AUTO: 8.1 % (ref 0–13.4)
NEUTROPHILS # BLD AUTO: 2.32 K/UL (ref 2–7.15)
NEUTROPHILS NFR BLD: 56.7 % (ref 44–72)
NRBC # BLD AUTO: 0 K/UL
NRBC BLD-RTO: 0 /100 WBC
PLATELET # BLD AUTO: 274 K/UL (ref 164–446)
PMV BLD AUTO: 9.7 FL (ref 9–12.9)
POTASSIUM SERPL-SCNC: 4.2 MMOL/L (ref 3.6–5.5)
PROT SERPL-MCNC: 7.2 G/DL (ref 6–8.2)
RBC # BLD AUTO: 4.82 M/UL (ref 4.2–5.4)
SODIUM SERPL-SCNC: 139 MMOL/L (ref 135–145)
T3FREE SERPL-MCNC: 5.53 PG/ML (ref 2.4–4.2)
T4 FREE SERPL-MCNC: 0.85 NG/DL (ref 0.53–1.43)
TRIGL SERPL-MCNC: 156 MG/DL (ref 0–149)
TSH SERPL DL<=0.005 MIU/L-ACNC: 0.75 UIU/ML (ref 0.38–5.33)
VIT B12 SERPL-MCNC: 448 PG/ML (ref 211–911)
WBC # BLD AUTO: 4.1 K/UL (ref 4.8–10.8)

## 2019-09-17 PROCEDURE — 84439 ASSAY OF FREE THYROXINE: CPT

## 2019-09-17 PROCEDURE — 84481 FREE ASSAY (FT-3): CPT

## 2019-09-17 PROCEDURE — 84443 ASSAY THYROID STIM HORMONE: CPT

## 2019-09-17 PROCEDURE — 82306 VITAMIN D 25 HYDROXY: CPT

## 2019-09-17 PROCEDURE — 80053 COMPREHEN METABOLIC PANEL: CPT

## 2019-09-17 PROCEDURE — 85025 COMPLETE CBC W/AUTO DIFF WBC: CPT

## 2019-09-17 PROCEDURE — 36415 COLL VENOUS BLD VENIPUNCTURE: CPT

## 2019-09-17 PROCEDURE — 80061 LIPID PANEL: CPT

## 2019-09-17 PROCEDURE — 82607 VITAMIN B-12: CPT

## 2019-10-15 DIAGNOSIS — E03.9 HYPOTHYROIDISM, UNSPECIFIED TYPE: ICD-10-CM

## 2019-10-17 ENCOUNTER — TELEPHONE (OUTPATIENT)
Dept: MEDICAL GROUP | Facility: IMAGING CENTER | Age: 64
End: 2019-10-17

## 2019-10-17 NOTE — TELEPHONE ENCOUNTER
----- Message from Nicki Rivas sent at 10/17/2019 12:36 PM PDT -----  Regarding: Rx Refills  Contact: [phone]  Pt called to schedule an appt with dr jessica however she has ran out of her medications (listed below). She is requesting to have them filled. Thank you    Progesterone  Jian thyroid    She needs 90 day supply    [facility]    [phone]

## 2019-10-28 ENCOUNTER — OFFICE VISIT (OUTPATIENT)
Dept: MEDICAL GROUP | Facility: IMAGING CENTER | Age: 64
End: 2019-10-28
Payer: COMMERCIAL

## 2019-10-28 VITALS
HEIGHT: 66 IN | OXYGEN SATURATION: 99 % | TEMPERATURE: 97.2 F | HEART RATE: 72 BPM | SYSTOLIC BLOOD PRESSURE: 110 MMHG | DIASTOLIC BLOOD PRESSURE: 72 MMHG | BODY MASS INDEX: 23.91 KG/M2 | WEIGHT: 148.8 LBS

## 2019-10-28 DIAGNOSIS — E53.8 B12 DEFICIENCY: ICD-10-CM

## 2019-10-28 DIAGNOSIS — E03.9 HYPOTHYROIDISM, UNSPECIFIED TYPE: ICD-10-CM

## 2019-10-28 DIAGNOSIS — Z12.39 BREAST CANCER SCREENING: ICD-10-CM

## 2019-10-28 DIAGNOSIS — N95.1 HOT FLASHES DUE TO MENOPAUSE: ICD-10-CM

## 2019-10-28 DIAGNOSIS — Z11.59 ENCOUNTER FOR HEPATITIS C SCREENING TEST FOR LOW RISK PATIENT: ICD-10-CM

## 2019-10-28 DIAGNOSIS — Z12.11 COLON CANCER SCREENING: ICD-10-CM

## 2019-10-28 DIAGNOSIS — E78.5 DYSLIPIDEMIA: ICD-10-CM

## 2019-10-28 DIAGNOSIS — Z79.890 CURRENT LONG-TERM USE OF POSTMENOPAUSAL HORMONE REPLACEMENT THERAPY: ICD-10-CM

## 2019-10-28 PROCEDURE — 99214 OFFICE O/P EST MOD 30 MIN: CPT | Performed by: FAMILY MEDICINE

## 2019-10-28 RX ORDER — ESTRADIOL 0.03 MG/D
1 FILM, EXTENDED RELEASE TRANSDERMAL
Qty: 4 PATCH | Refills: 0 | Status: SHIPPED | OUTPATIENT
Start: 2019-10-28 | End: 2019-12-30

## 2019-10-28 NOTE — ASSESSMENT & PLAN NOTE
Discussed the increased risk of breast cancer when used for more than 5 years  Patient feels that it is worth a with risk and would like to continue the medication however she is willing to trial reducing the estradiol from 0.05 to0.025

## 2019-10-28 NOTE — ASSESSMENT & PLAN NOTE
Recent t3 elevated just over 5  tsh normal lower range 0.9  No symptoms  We will continue the thyroid medication as is and repeat the labs in 2 months

## 2019-10-28 NOTE — PROGRESS NOTES
Subjective:     CC:    Chief Complaint   Patient presents with   • Establish Care   • Results     blood work    • Medication Refill     armour thyrpid, progesterone, estradiol patch        HISTORY OF THE PRESENT ILLNESS: This pleasant 64 y.o. female is here to establish care and discuss labs from prior doctor. His/her prior PCP was Dr. Pelaez. She is in need of refills for thyroid, and hormone replacement therapy.   Due for colonoscopy and mammogram and is agreeable to do both.  How many years on hormone replacement therapy?: 9years  She trialed off the estradiol for one week and experienced hot flashes at night. She feels that she would like to continue despite the breast cancer risk.  She is agreeable to trial a lower dose per    Allergies: Asa [aspirin]    Current Outpatient Medications Ordered in Epic   Medication Sig Dispense Refill   • progesterone (PROMETRIUM) 200 MG capsule TAKE 1 CAPSULE BY MOUTH EVERYDAY AT BEDTIME 90 Cap 0   • estradiol (CLIMARA) 0.05 MG/24HR PATCH WEEKLY APPLY 1 PATCH TO SKIN AS DIRECTED EVERY 7 DAYS. 12 Patch 1   • ARMOUR THYROID 90 MG Tab Take 1 Tab by mouth Every morning on an empty stomach. 90 Tab 0   • Omega-3 Fatty Acids (FISH OIL PO) Take  by mouth.     • cyanocobalamin (VITAMIN B-12) 1000 MCG/ML Solution 1,000 mcg by Intramuscular route every 30 days.     • BIOTIN PO Take 600 mcg by mouth.     • Ascorbic Acid (VITAMIN C-CANDIDA HIPS) 1000 MG Tab Take  by mouth.     • Cholecalciferol (VITAMIN D-3) 5000 units Tab Take  by mouth.     • Vitamin E 400 UNIT Tab Take  by mouth.     • B Complex Vitamins (B COMPLEX PO) Take  by mouth.     • CALCIUM-MAGNESIUM-ZINC PO Take  by mouth.     • EC-RX TESTOSTERONE TD Apply  to skin as directed.     • GLYCINE PO Take  by mouth.     • ADVANCED EVENING PRIMROSE OIL PO Take  by mouth.     • Bilberry, Vaccinium myrtillus, (BILBERRY PO) Take  by mouth.     • ASHWAGANDHA PO Take  by mouth.     • Zinc 50 MG Tab Take  by mouth.       No current Epic-ordered  "facility-administered medications on file.        Past Medical History:   Diagnosis Date   • Dyslipidemia    • Ovarian cyst 10/2005    Right.   • Renal cyst 9/215    Right - stable, possible stone   • Renal cyst 10/2008    Left, simple 1.6cm. During ovarian US       Past Surgical History:   Procedure Laterality Date   • OTHER SURGICAL PROCEDURE Left 10/09/2006    Varicose veins procedure in office   • MARKUS BY LAPAROSCOPY  07/31/2003    due to cholecystitis   • OTHER ORTHOPEDIC SURGERY Right 01/26/2001    slipped on ice. Pins in wrist   • HYSTERECTOMY LAPAROSCOPY  10/1995    partial (still has ovaries). Hx of fibroids/menorrhagia       Social History     Tobacco Use   • Smoking status: Never Smoker   • Smokeless tobacco: Never Used   Substance Use Topics   • Alcohol use: Yes     Alcohol/week: 1.2 oz     Types: 2 Glasses of wine per week     Comment: glass of wine 1-2x /wk   • Drug use: No       Social History     Social History Narrative   • Not on file       Family History   Problem Relation Age of Onset   • Diabetes Mother         Adult onset, on insulin   • Arthritis Mother         RA   • Heart Disease Father         CHF   • Diabetes Sister         borderline   • Thyroid Sister         hypothyroid   • Diabetes Sister         borderline   • Diabetes Sister    • Diabetes Sister    • Diabetes Sister    • Other Son         AV valve repair - possibly genetic   • Hypertension Son        ROS:   Gen: no fevers/chills, no changes in weight  Eyes: no changes in vision  ENT: no sore throat, no hearing loss, no bloody nose  Pulm: no sob, no cough  CV: no chest pain, no palpitations  GI: no nausea/vomiting, no diarrhea  : no dysuria  MSk: no myalgias  Skin: no rash  Neuro: no headaches, no numbness/tingling  Heme/Lymph: no easy bruising      Objective:     Exam: /72 (BP Location: Left arm, Patient Position: Sitting, BP Cuff Size: Adult)   Pulse 72   Temp 36.2 °C (97.2 °F) (Temporal)   Ht 1.676 m (5' 6\")   Wt 67.5 " kg (148 lb 12.8 oz)   SpO2 99%  Body mass index is 24.02 kg/m².    General: Normal appearing. No distress.  HEENT: Normocephalic. Eyes conjunctiva clear lids without ptosis, pupils equal and reactive to light    Neck: Supple without JVD or bruit. Thyroid is not enlarged.  Pulmonary: Clear to ausculation.  Normal effort. No rales, ronchi, or wheezing.  Cardiovascular: Regular rate and rhythm without murmur. Carotid and radial pulses are intact and equal bilaterally.  Neurologic:  Alert    Lymph: No cervical or supraclavicular lymph nodes are palpable  Skin: Warm and dry.  No obvious lesions.  Musculoskeletal: Normal gait. No extremity cyanosis, clubbing, or edema.  Psych: Normal mood and affect.Judgment and insight is normal.      Assessment & Plan:   64 y.o. female with the following -    Problem List Items Addressed This Visit     Current long-term use of postmenopausal hormone replacement therapy     Discussed the increased risk of breast cancer when used for more than 5 years  Patient feels that it is worth a with risk and would like to continue the medication however she is willing to trial reducing the estradiol from 0.05 to0.025         Hypothyroidism     Recent t3 elevated just over 5  tsh normal lower range 0.9  No symptoms  We will continue the thyroid medication as is and repeat the labs in 2 months           Relevant Orders    TSH    TRIIDOTHYRONINE    FREE THYROXINE    B12 deficiency     Sept 2019 b12 normal         Dyslipidemia     The 10-year ASCVD risk score (Marcos ANDRIA Jr., et al., 2013) is: 3.7%  ldl 130 recommend low cholesterol diet           Other Visit Diagnoses     Hot flashes due to menopause        Relevant Medications    Estradiol 0.025 MG/24HR PATCH BIWEEKLY    Encounter for hepatitis C screening test for low risk patient        Relevant Orders    HEP C VIRUS ANTIBODY    Colon cancer screening        Relevant Orders    REFERRAL TO GI FOR COLONOSCOPY    Breast cancer screening        Relevant  Orders    MA-MAMMO DIAGNOSTIC BILAT W/EMERITA W/CAD        Problem   Dyslipidemia    The 10-year ASCVD risk score ([place] ANDRIA Jr., et al., 2013) is: 3.7%  ldl 130 recommend low cholesterol diet     Current Long-Term Use of Postmenopausal Hormone Replacement Therapy    Since 2008     Hypothyroidism    Started in 2008.   [place] thryoid.      B12 Deficiency    Had been getting B12 1000 mcg/mL injections since 2016       Return for annual.    Please note that this dictation was created using voice recognition software. I have made every reasonable attempt to correct obvious errors, but I expect that there are errors of grammar and possibly content that I did not discover before finalizing the note.

## 2019-11-04 RX ORDER — THYROID,PORK 90 MG
90 TABLET ORAL
Qty: 90 TAB | Refills: 0 | Status: SHIPPED | OUTPATIENT
Start: 2019-11-04 | End: 2019-11-20 | Stop reason: SDUPTHER

## 2019-11-14 DIAGNOSIS — R23.2 HOT FLASHES: ICD-10-CM

## 2019-11-14 DIAGNOSIS — E03.9 HYPOTHYROIDISM, UNSPECIFIED TYPE: ICD-10-CM

## 2019-11-14 NOTE — TELEPHONE ENCOUNTER
----- Message from Nicki Rivas sent at 11/14/2019 10:15 AM PST -----  Regarding: Pharmacy change and RX request  Pt called requesting that we change her pharmacy to the "[facility] in [place]. Her insurance covers her medications better when they are filled at [facility]. Please send her Progesterone 200 mg 90 day supply as well as her Thyroid medication 90 day supply to this new pharmacy asap. She is requesting a call back once they are sent.    Thank you    [phone]

## 2019-11-20 RX ORDER — THYROID,PORK 90 MG
90 TABLET ORAL
Qty: 90 TAB | Refills: 0 | Status: SHIPPED | OUTPATIENT
Start: 2019-11-20 | End: 2020-03-16 | Stop reason: SDUPTHER

## 2019-12-29 DIAGNOSIS — N95.1 HOT FLASHES DUE TO MENOPAUSE: ICD-10-CM

## 2019-12-30 RX ORDER — ESTRADIOL 0.03 MG/D
1 FILM, EXTENDED RELEASE TRANSDERMAL
Qty: 12 PATCH | Refills: 1 | Status: SHIPPED | OUTPATIENT
Start: 2019-12-30 | End: 2020-01-10

## 2020-01-10 DIAGNOSIS — E89.40 POSTSURGICAL MENOPAUSE: ICD-10-CM

## 2020-01-10 RX ORDER — ESTRADIOL 0.05 MG/D
1 PATCH TRANSDERMAL
Qty: 12 PATCH | Refills: 1 | Status: SHIPPED | OUTPATIENT
Start: 2020-01-10 | End: 2020-10-02

## 2020-01-15 DIAGNOSIS — R23.2 HOT FLASHES: ICD-10-CM

## 2020-03-16 ENCOUNTER — HOSPITAL ENCOUNTER (OUTPATIENT)
Dept: RADIOLOGY | Facility: MEDICAL CENTER | Age: 65
End: 2020-03-16
Payer: COMMERCIAL

## 2020-03-16 DIAGNOSIS — E03.9 HYPOTHYROIDISM, UNSPECIFIED TYPE: ICD-10-CM

## 2020-03-16 RX ORDER — THYROID,PORK 90 MG
90 TABLET ORAL
Qty: 90 TAB | Refills: 0 | Status: SHIPPED | OUTPATIENT
Start: 2020-03-16 | End: 2020-06-09

## 2020-03-23 ENCOUNTER — TELEPHONE (OUTPATIENT)
Dept: MEDICAL GROUP | Facility: IMAGING CENTER | Age: 65
End: 2020-03-23

## 2020-03-23 ENCOUNTER — PATIENT MESSAGE (OUTPATIENT)
Dept: MEDICAL GROUP | Facility: IMAGING CENTER | Age: 65
End: 2020-03-23

## 2020-03-23 ENCOUNTER — TELEPHONE (OUTPATIENT)
Dept: HEALTH INFORMATION MANAGEMENT | Facility: OTHER | Age: 65
End: 2020-03-23

## 2020-03-23 DIAGNOSIS — J06.9 UPPER RESPIRATORY TRACT INFECTION, UNSPECIFIED TYPE: ICD-10-CM

## 2020-03-23 RX ORDER — CODEINE PHOSPHATE AND GUAIFENESIN 10; 100 MG/5ML; MG/5ML
5 SOLUTION ORAL EVERY 12 HOURS PRN
Qty: 70 ML | Refills: 0 | Status: SHIPPED | OUTPATIENT
Start: 2020-03-23 | End: 2020-03-25 | Stop reason: SDUPTHER

## 2020-03-23 NOTE — TELEPHONE ENCOUNTER
1. Caller Name: Lidia                        Call Back Number: cell  [facility] PCP or Specialty Provider: Yes Kayleen Schwarz        2.  Does patient have any active symptoms of respiratory illness (fever OR cough OR shortness of breath OR sore throat)? Yes, the patient reports the following respiratory symptoms: sore throat. Dry cough, tired, cough worsen, glossy eyes, stuffy nose, digestion issues have resolved, cough and congestion worse as of yesterday, itchy left ear, watery eyes. Today productive cough, sinus infection, unable to test temperature. If take a deep breath makes her cough. Breathing through mouth. Took Thera flu on Saturday, Robitussin Severe cough and sore throat. Also taking ibuprofen which helped a little.     3.  Does patient have any comoribidities? None     4.  Has the patient traveled in the last 14 days OR had any known contact with someone who is suspected or confirmed to have COVID-19?  No.    5. Disposition: Advised to perform self care, monitor for worsening symptoms and to call back in 3 days if no improvement    Note routed to [facility] Provider: Provider action needed: Script needed for cough and sinus infection if applicable.

## 2020-03-24 NOTE — TELEPHONE ENCOUNTER
[facility] [address] 3/23/2020 @ 5:15 /kb

## 2020-03-25 DIAGNOSIS — J06.9 UPPER RESPIRATORY TRACT INFECTION, UNSPECIFIED TYPE: ICD-10-CM

## 2020-03-25 RX ORDER — CODEINE PHOSPHATE AND GUAIFENESIN 10; 100 MG/5ML; MG/5ML
5 SOLUTION ORAL EVERY 12 HOURS PRN
Qty: 70 ML | Refills: 0 | Status: SHIPPED | OUTPATIENT
Start: 2020-03-25 | End: 2020-04-01

## 2020-03-25 NOTE — TELEPHONE ENCOUNTER
Pt rx for cough syrup was sent to wrong pharmacy.       Received request via: Pharmacy    Was the patient seen in the last year in this department? Yes    Does the patient have an active prescription (recently filled or refills available) for medication(s) requested? No

## 2020-06-09 DIAGNOSIS — E03.9 HYPOTHYROIDISM, UNSPECIFIED TYPE: ICD-10-CM

## 2020-06-09 RX ORDER — THYROID,PORK 90 MG
90 TABLET ORAL
Qty: 90 TAB | Refills: 0 | Status: SHIPPED | OUTPATIENT
Start: 2020-06-09 | End: 2020-10-02

## 2020-07-07 DIAGNOSIS — R23.2 HOT FLASHES: ICD-10-CM

## 2020-07-15 ENCOUNTER — TELEPHONE (OUTPATIENT)
Dept: MEDICAL GROUP | Facility: IMAGING CENTER | Age: 65
End: 2020-07-15

## 2020-07-15 NOTE — TELEPHONE ENCOUNTER
Patient calling in requesting a note for her gym. She would like the note to place her gym membership on hold for 3 months due to covid-19 as she does not feel comfortable going at this time.

## 2020-07-27 ENCOUNTER — TELEPHONE (OUTPATIENT)
Dept: MEDICAL GROUP | Facility: IMAGING CENTER | Age: 65
End: 2020-07-27

## 2020-07-27 NOTE — TELEPHONE ENCOUNTER
Pt calling to inquire if we received a copy of her covid test results that were performed at [facility] on 7/20/20. No results received. [facility] will [facility] with [facility]. Pt has no further questions at this time.

## 2020-09-28 DIAGNOSIS — Z78.0 MENOPAUSE: ICD-10-CM

## 2020-09-29 DIAGNOSIS — E03.9 HYPOTHYROIDISM, UNSPECIFIED TYPE: ICD-10-CM

## 2020-10-01 DIAGNOSIS — E89.40 POSTSURGICAL MENOPAUSE: ICD-10-CM

## 2020-10-02 RX ORDER — THYROID,PORK 90 MG
90 TABLET ORAL
Qty: 90 TAB | Refills: 0 | Status: SHIPPED | OUTPATIENT
Start: 2020-10-02 | End: 2020-12-31

## 2020-10-02 RX ORDER — ESTRADIOL 0.05 MG/D
1 PATCH TRANSDERMAL
Qty: 12 PATCH | Refills: 1 | Status: SHIPPED | OUTPATIENT
Start: 2020-10-02 | End: 2021-02-01

## 2020-10-07 ENCOUNTER — HOSPITAL ENCOUNTER (OUTPATIENT)
Dept: RADIOLOGY | Facility: MEDICAL CENTER | Age: 65
End: 2020-10-07
Attending: FAMILY MEDICINE
Payer: COMMERCIAL

## 2020-10-07 DIAGNOSIS — Z12.39 BREAST CANCER SCREENING: ICD-10-CM

## 2020-10-07 PROCEDURE — 77067 SCR MAMMO BI INCL CAD: CPT

## 2020-10-13 ENCOUNTER — HOSPITAL ENCOUNTER (OUTPATIENT)
Dept: RADIOLOGY | Facility: MEDICAL CENTER | Age: 65
End: 2020-10-13
Attending: FAMILY MEDICINE
Payer: COMMERCIAL

## 2020-10-13 DIAGNOSIS — R92.8 ABNORMAL MAMMOGRAM: ICD-10-CM

## 2020-10-13 PROCEDURE — 77065 DX MAMMO INCL CAD UNI: CPT | Mod: LT

## 2020-11-20 ENCOUNTER — TELEMEDICINE (OUTPATIENT)
Dept: MEDICAL GROUP | Facility: IMAGING CENTER | Age: 65
End: 2020-11-20
Payer: COMMERCIAL

## 2020-11-20 VITALS — HEIGHT: 66 IN | WEIGHT: 141 LBS | BODY MASS INDEX: 22.66 KG/M2

## 2020-11-20 DIAGNOSIS — Z13.0 SCREENING FOR DEFICIENCY ANEMIA: ICD-10-CM

## 2020-11-20 DIAGNOSIS — E53.8 B12 DEFICIENCY: ICD-10-CM

## 2020-11-20 DIAGNOSIS — Z11.59 ENCOUNTER FOR HEPATITIS C VIRUS SCREENING TEST FOR HIGH RISK PATIENT: ICD-10-CM

## 2020-11-20 DIAGNOSIS — Z91.89 ENCOUNTER FOR HEPATITIS C VIRUS SCREENING TEST FOR HIGH RISK PATIENT: ICD-10-CM

## 2020-11-20 DIAGNOSIS — Z13.220 SCREENING FOR CHOLESTEROL LEVEL: ICD-10-CM

## 2020-11-20 DIAGNOSIS — E55.9 VITAMIN D DEFICIENCY: ICD-10-CM

## 2020-11-20 DIAGNOSIS — Z13.1 SCREENING FOR DIABETES MELLITUS: ICD-10-CM

## 2020-11-20 DIAGNOSIS — M25.559 HIP PAIN: ICD-10-CM

## 2020-11-20 DIAGNOSIS — E03.9 HYPOTHYROIDISM, UNSPECIFIED TYPE: ICD-10-CM

## 2020-11-20 PROCEDURE — 99214 OFFICE O/P EST MOD 30 MIN: CPT | Mod: 95,CR | Performed by: FAMILY MEDICINE

## 2020-11-20 ASSESSMENT — FIBROSIS 4 INDEX: FIB4 SCORE: 1.25

## 2020-11-20 NOTE — PROGRESS NOTES
Telemedicine Visit: New Patient     This encounter was conducted via Zoom.   Verbal consent was obtained. Patient's identity was verified. Patient aware this will be   billed the same as an in person evaluation.  Patient in home, I am in office at [address] [place] [address]  Subjective:     Chief Complaint   Patient presents with   • Requesting Labs   • Hip Pain     right, x 2 months occasionally    • Cough     dry, x 2 weeks, seems worse        Lidia Dior is a 65 y.o. female with history of hypothyroidism and vitamin D deficiency B12 deficiency dyslipidemia on virtual visit to discuss obtaining labs, hip pain, and cough.  Right hip pain for two months occasional.   She denies trauma.   No swelling or redness, no fevers or chills.   Can walk normal. Can walk without pain. Feels it most at night which will wake up though does go away. Also if she lays down during the day she can feel it.      Cough is occurs when she goes outside. No cough at night. Asthma runs in her family. No fevers does not feel sick. No exposure to covid.     ROS  See HPI  Constitutional: Negative for fever, chills and malaise/fatigue.   HENT: Negative for congestion, itchy watery eyes  Eyes: Negative for pain or sudden changes in vision  Respiratory: With cough and shortness of breath.    Cardiovascular: Negative for leg swelling or chest pain  Gastrointestinal: Negative for nausea, vomiting, abdominal pain and diarrhea.   Genitourinary: Negative for dysuria and hematuria.   Skin: Negative for rash or concerning moles   Neurological: Negative for dizziness, focal weakness   Psychiatric/Behavioral: Negative for depression.  The patient is not nervous/anxious.    Musculoskeletal: no weakness or joint stiffness    Allergies   Allergen Reactions   • Asa [Aspirin] Nausea       Current medicines (including changes today)  Current Outpatient Medications   Medication Sig Dispense Refill   • Probiotic Product (PROBIOTIC PO)      •  Multiple Vitamins-Minerals (HAIR SKIN AND NAILS FORMULA PO)      • MISC NATURAL PRODUCTS PO Take  by mouth.     • ARMOUR THYROID 90 MG Tab TAKE 1 TAB BY MOUTH EVERY MORNING ON AN EMPTY STOMACH. 90 Tab 0   • estradiol (CLIMARA) 0.05 MG/24HR PATCH WEEKLY APPLY 1 PATCH TO SKIN AS DIRECTED EVERY 7 DAYS. 12 Patch 1   • progesterone (PROMETRIUM) 100 MG Cap Take 1 Cap by mouth every day. 90 Cap 3   • Omega-3 Fatty Acids (FISH OIL PO) Take  by mouth.     • Ascorbic Acid (VITAMIN C-CANDIDA HIPS) 1000 MG Tab Take  by mouth.     • Cholecalciferol (VITAMIN D-3) 5000 units Tab Take  by mouth.     • Vitamin E 400 UNIT Tab Take  by mouth.     • B Complex Vitamins (B COMPLEX PO) Take  by mouth.     • CALCIUM-MAGNESIUM-ZINC PO Take  by mouth.     • EC-RX TESTOSTERONE TD Apply  to skin as directed.     • cyanocobalamin (VITAMIN B-12) 1000 MCG/ML Solution 1,000 mcg by Intramuscular route every 30 days.     • GLYCINE PO Take  by mouth.     • Zinc 50 MG Tab Take  by mouth.       No current facility-administered medications for this visit.        She  has a past medical history of Dyslipidemia, Ovarian cyst (10/2005), Renal cyst (), and Renal cyst (10/2008).  She  has a past surgical history that includes hysterectomy laparoscopy (10/1995); other orthopedic surgery (Right, 2001); abbey by laparoscopy (2003); and other surgical procedure (Left, 10/09/2006).      Family History   Problem Relation Age of Onset   • Diabetes Mother         Adult onset, on insulin   • Arthritis Mother         RA   • Heart Disease Father         CHF   • Diabetes Sister         borderline   • Thyroid Sister         hypothyroid   • Diabetes Sister         borderline   • Diabetes Sister    • Diabetes Sister    • Diabetes Sister    • Other Son         AV valve repair - possibly genetic   • Hypertension Son      Family Status   Relation Name Status   • Mo   at age 85        hospital complications   • Fa   at age 99   • Sis  Alive   •  "MGMo   at age 103   • MGFa     • PGMo     • PGFa     • Sis  Alive   • Sis  Alive   • Sis  Alive   • Sis  Alive   • Son  Alive, age 47y       Patient Active Problem List    Diagnosis Date Noted   • Dyslipidemia 10/03/2018   • Healthcare maintenance 2018   • Current long-term use of postmenopausal hormone replacement therapy 2018   • Hypothyroidism 2018   • Vitamin D deficiency 2018   • B12 deficiency 2018   • Postsurgical menopause 2018          Objective:   Vitals obtained by patient:  Ht 1.676 m (5' 6\")   Wt 64 kg (141 lb)   BMI 22.76 kg/m²     Physical Exam:  Constitutional: Alert, no distress, well-groomed.  Skin: No rashes in visible areas.  Eye: Round. Conjunctiva clear, lids normal. No icterus.   ENMT: Lips pink without lesions, good dentition, moist mucous membranes. Phonation normal.  Neck: No masses, no thyromegaly. Moves freely without pain.  CV: Pulse as reported by patient  Respiratory: Unlabored respiratory effort, no cough or audible wheeze  Psych: Alert and oriented x3, normal affect and mood.   Neuro: symmetric face. Alert and oriented. Follows commands. No aphasia or dysarthria.    Labs:  No visits with results within 1 Month(s) from this visit.   Latest known visit with results is:   Hospital Outpatient Visit on 2019   Component Date Value Ref Range Status   • Sodium 2019 139  135 - 145 mmol/L Final   • Potassium 2019 4.2  3.6 - 5.5 mmol/L Final   • Chloride 2019 103  96 - 112 mmol/L Final   • Co2 2019 27  20 - 33 mmol/L Final   • Anion Gap 2019 9.0  0.0 - 11.9 Final   • Glucose 2019 95  65 - 99 mg/dL Final   • Bun 2019 10  8 - 22 mg/dL Final   • Creatinine 2019 0.85  0.50 - 1.40 mg/dL Final   • Calcium 2019 9.6  8.5 - 10.5 mg/dL Final   • AST(SGOT) 2019 22  12 - 45 U/L Final   • ALT(SGPT) 2019 17  2 - 50 U/L Final   • Alkaline Phosphatase 2019 64  30 - " 99 U/L Final   • Total Bilirubin 09/17/2019 0.8  0.1 - 1.5 mg/dL Final   • Albumin 09/17/2019 4.6  3.2 - 4.9 g/dL Final   • Total Protein 09/17/2019 7.2  6.0 - 8.2 g/dL Final   • Globulin 09/17/2019 2.6  1.9 - 3.5 g/dL Final   • A-G Ratio 09/17/2019 1.8  g/dL Final   • Vitamin B12 -True Cobalamin 09/17/2019 448  211 - 911 pg/mL Final   • 25-Hydroxy   Vitamin D 25 09/17/2019 35  30 - 100 ng/mL Final    Comment: Adult Ranges:   <20 ng/mL - Deficiency  20-29 ng/mL - Insufficiency   ng/mL - Sufficiency  The [employer] Centaur Vitamin D Assay is standardized to the  [facility] reference measurement procedures, a  reference method for the Vitamin D Standardization Program  (VDSP).  The VDSP aligns patient results among 25 (OH)  Vitamin D methods.     • WBC 09/17/2019 4.1* 4.8 - 10.8 K/uL Final   • RBC 09/17/2019 4.82  4.20 - 5.40 M/uL Final   • Hemoglobin 09/17/2019 15.1  12.0 - 16.0 g/dL Final   • Hematocrit 09/17/2019 45.2  37.0 - 47.0 % Final   • MCV 09/17/2019 93.8  81.4 - 97.8 fL Final   • MCH 09/17/2019 31.3  27.0 - 33.0 pg Final   • MCHC 09/17/2019 33.4* 33.6 - 35.0 g/dL Final   • RDW 09/17/2019 41.9  35.9 - 50.0 fL Final   • Platelet Count 09/17/2019 274  164 - 446 K/uL Final   • MPV 09/17/2019 9.7  9.0 - 12.9 fL Final   • Neutrophils-Polys 09/17/2019 56.70  44.00 - 72.00 % Final   • Lymphocytes 09/17/2019 29.30  22.00 - 41.00 % Final   • Monocytes 09/17/2019 8.10  0.00 - 13.40 % Final   • Eosinophils 09/17/2019 4.40  0.00 - 6.90 % Final   • Basophils 09/17/2019 1.50  0.00 - 1.80 % Final   • Immature Granulocytes 09/17/2019 0.00  0.00 - 0.90 % Final   • Nucleated RBC 09/17/2019 0.00  /100 WBC Final   • Neutrophils (Absolute) 09/17/2019 2.32  2.00 - 7.15 K/uL Final    Includes immature neutrophils, if present.   • Lymphs (Absolute) 09/17/2019 1.20  1.00 - 4.80 K/uL Final   • Monos (Absolute) 09/17/2019 0.33  0.00 - 0.85 K/uL Final   • Eos (Absolute) 09/17/2019 0.18  0.00 - 0.51 K/uL Final   • Baso (Absolute)  09/17/2019 0.06  0.00 - 0.12 K/uL Final   • Immature Granulocytes (abs) 09/17/2019 0.00  0.00 - 0.11 K/uL Final   • NRBC (Absolute) 09/17/2019 0.00  K/uL Final   • Cholesterol,Tot 09/17/2019 211* 100 - 199 mg/dL Final   • Triglycerides 09/17/2019 156* 0 - 149 mg/dL Final   • HDL 09/17/2019 50  >=40 mg/dL Final   • LDL 09/17/2019 130* <100 mg/dL Final   • T3,Free 09/17/2019 5.53* 2.40 - 4.20 pg/mL Final   • Free T-4 09/17/2019 0.85  0.53 - 1.43 ng/dL Final   • TSH 09/17/2019 0.750  0.380 - 5.330 uIU/mL Final    Comment: Please note new reference ranges effective 12/14/2017 10:00 AM  Pregnant Females, 1st Trimester  0.050-3.700  Pregnant Females, 2nd Trimester  0.310-4.350  Pregnant Females, 3rd Trimester  0.410-5.180     • Fasting Status 09/17/2019 Fasting   Final   • GFR If  09/17/2019 >60  >60 mL/min/1.73 m 2 Final   • GFR If Non  09/17/2019 >60  >60 mL/min/1.73 m 2 Final       Imaging:   No results found.    Assessment and Plan:   The following treatment plan was discussed:   Reactive cough to the cold.  Will monitor.  Warm teas to treat.  Return to office if any new symptoms occur.  She is low risk for Covid exposure.    Problem List Items Addressed This Visit     Hypothyroidism    Relevant Orders    TSH WITH REFLEX TO FT4    Vitamin D deficiency    Relevant Orders    VITAMIN D,25 HYDROXY    B12 deficiency    Relevant Orders    VITAMIN B12      Other Visit Diagnoses     Screening for deficiency anemia        Relevant Orders    CBC WITH DIFFERENTIAL    Screening for diabetes mellitus        Relevant Orders    Comp Metabolic Panel    Encounter for hepatitis C virus screening test for high risk patient        Relevant Orders    HEP C VIRUS ANTIBODY    Screening for cholesterol level        Relevant Orders    Lipid Profile    Hip pain        Relevant Orders    DX-HIP-BILATERAL-WITH PELVIS-3/4 VIEWS          Follow-up: Return if symptoms worsen or fail to improve.        Portions of  this note may be dictated using Dragon NaturallySpeaTheraSim voice recognition software.  Variances in spelling and vocabulary are possible and unintentional.  Not all areas may be caught/corrected.  Please notify me if any discrepancies are noted or if the meaning of any statement is not correct/clear.

## 2020-12-30 DIAGNOSIS — E03.9 HYPOTHYROIDISM, UNSPECIFIED TYPE: ICD-10-CM

## 2020-12-31 RX ORDER — THYROID,PORK 90 MG
90 TABLET ORAL
Qty: 90 TAB | Refills: 0 | Status: SHIPPED | OUTPATIENT
Start: 2020-12-31 | End: 2021-05-03 | Stop reason: SDUPTHER

## 2021-01-30 DIAGNOSIS — E89.40 POSTSURGICAL MENOPAUSE: ICD-10-CM

## 2021-02-01 RX ORDER — ESTRADIOL 0.05 MG/D
1 PATCH TRANSDERMAL
Qty: 12 PATCH | Refills: 1 | Status: SHIPPED | OUTPATIENT
Start: 2021-02-01 | End: 2021-06-08

## 2021-02-01 NOTE — TELEPHONE ENCOUNTER
Received request via: Pharmacy    Was the patient seen in the last year in this department? Yes    Does the patient have an active prescription (recently filled or refills available) for medication(s) requested? No    02/02/2021 - note is error, duplicate)

## 2021-02-11 ENCOUNTER — HOSPITAL ENCOUNTER (OUTPATIENT)
Dept: LAB | Facility: MEDICAL CENTER | Age: 66
End: 2021-02-11
Attending: FAMILY MEDICINE
Payer: COMMERCIAL

## 2021-02-11 DIAGNOSIS — Z91.89 ENCOUNTER FOR HEPATITIS C VIRUS SCREENING TEST FOR HIGH RISK PATIENT: ICD-10-CM

## 2021-02-11 DIAGNOSIS — Z13.0 SCREENING FOR DEFICIENCY ANEMIA: ICD-10-CM

## 2021-02-11 DIAGNOSIS — E53.8 B12 DEFICIENCY: ICD-10-CM

## 2021-02-11 DIAGNOSIS — Z11.59 ENCOUNTER FOR HEPATITIS C VIRUS SCREENING TEST FOR HIGH RISK PATIENT: ICD-10-CM

## 2021-02-11 DIAGNOSIS — E55.9 VITAMIN D DEFICIENCY: ICD-10-CM

## 2021-02-11 DIAGNOSIS — Z13.220 SCREENING FOR CHOLESTEROL LEVEL: ICD-10-CM

## 2021-02-11 DIAGNOSIS — Z13.1 SCREENING FOR DIABETES MELLITUS: ICD-10-CM

## 2021-02-11 DIAGNOSIS — E03.9 HYPOTHYROIDISM, UNSPECIFIED TYPE: ICD-10-CM

## 2021-02-11 LAB
ALBUMIN SERPL BCP-MCNC: 4.4 G/DL (ref 3.2–4.9)
ALBUMIN/GLOB SERPL: 1.8 G/DL
ALP SERPL-CCNC: 68 U/L (ref 30–99)
ALT SERPL-CCNC: 18 U/L (ref 2–50)
ANION GAP SERPL CALC-SCNC: 9 MMOL/L (ref 7–16)
AST SERPL-CCNC: 20 U/L (ref 12–45)
BASOPHILS # BLD AUTO: 1.5 % (ref 0–1.8)
BASOPHILS # BLD: 0.06 K/UL (ref 0–0.12)
BILIRUB SERPL-MCNC: 0.7 MG/DL (ref 0.1–1.5)
BUN SERPL-MCNC: 15 MG/DL (ref 8–22)
CALCIUM SERPL-MCNC: 9.1 MG/DL (ref 8.4–10.2)
CHLORIDE SERPL-SCNC: 103 MMOL/L (ref 96–112)
CHOLEST SERPL-MCNC: 186 MG/DL (ref 100–199)
CO2 SERPL-SCNC: 26 MMOL/L (ref 20–33)
CREAT SERPL-MCNC: 0.67 MG/DL (ref 0.5–1.4)
EOSINOPHIL # BLD AUTO: 0.2 K/UL (ref 0–0.51)
EOSINOPHIL NFR BLD: 5.1 % (ref 0–6.9)
ERYTHROCYTE [DISTWIDTH] IN BLOOD BY AUTOMATED COUNT: 41 FL (ref 35.9–50)
FASTING STATUS PATIENT QL REPORTED: NORMAL
GLOBULIN SER CALC-MCNC: 2.5 G/DL (ref 1.9–3.5)
GLUCOSE SERPL-MCNC: 102 MG/DL (ref 65–99)
HCT VFR BLD AUTO: 42.1 % (ref 37–47)
HDLC SERPL-MCNC: 48 MG/DL
HGB BLD-MCNC: 14.7 G/DL (ref 12–16)
IMM GRANULOCYTES # BLD AUTO: 0 K/UL (ref 0–0.11)
IMM GRANULOCYTES NFR BLD AUTO: 0 % (ref 0–0.9)
LDLC SERPL CALC-MCNC: 102 MG/DL
LYMPHOCYTES # BLD AUTO: 1.04 K/UL (ref 1–4.8)
LYMPHOCYTES NFR BLD: 26.7 % (ref 22–41)
MCH RBC QN AUTO: 32.2 PG (ref 27–33)
MCHC RBC AUTO-ENTMCNC: 34.9 G/DL (ref 33.6–35)
MCV RBC AUTO: 92.3 FL (ref 81.4–97.8)
MONOCYTES # BLD AUTO: 0.34 K/UL (ref 0–0.85)
MONOCYTES NFR BLD AUTO: 8.7 % (ref 0–13.4)
NEUTROPHILS # BLD AUTO: 2.25 K/UL (ref 2–7.15)
NEUTROPHILS NFR BLD: 58 % (ref 44–72)
NRBC # BLD AUTO: 0 K/UL
NRBC BLD-RTO: 0 /100 WBC
PLATELET # BLD AUTO: 270 K/UL (ref 164–446)
PMV BLD AUTO: 9.3 FL (ref 9–12.9)
POTASSIUM SERPL-SCNC: 4.1 MMOL/L (ref 3.6–5.5)
PROT SERPL-MCNC: 6.9 G/DL (ref 6–8.2)
RBC # BLD AUTO: 4.56 M/UL (ref 4.2–5.4)
SODIUM SERPL-SCNC: 138 MMOL/L (ref 135–145)
TRIGL SERPL-MCNC: 179 MG/DL (ref 0–149)
TSH SERPL DL<=0.005 MIU/L-ACNC: 0.7 UIU/ML (ref 0.38–5.33)
WBC # BLD AUTO: 3.9 K/UL (ref 4.8–10.8)

## 2021-02-11 PROCEDURE — 84443 ASSAY THYROID STIM HORMONE: CPT

## 2021-02-11 PROCEDURE — 82607 VITAMIN B-12: CPT

## 2021-02-11 PROCEDURE — 36415 COLL VENOUS BLD VENIPUNCTURE: CPT

## 2021-02-11 PROCEDURE — 80061 LIPID PANEL: CPT

## 2021-02-11 PROCEDURE — 86803 HEPATITIS C AB TEST: CPT

## 2021-02-11 PROCEDURE — 82306 VITAMIN D 25 HYDROXY: CPT

## 2021-02-11 PROCEDURE — 80053 COMPREHEN METABOLIC PANEL: CPT

## 2021-02-11 PROCEDURE — 85025 COMPLETE CBC W/AUTO DIFF WBC: CPT

## 2021-02-12 LAB
25(OH)D3 SERPL-MCNC: 44 NG/ML (ref 30–100)
HCV AB SER QL: NORMAL
VIT B12 SERPL-MCNC: 494 PG/ML (ref 211–911)

## 2021-03-31 ENCOUNTER — IMMUNIZATION (OUTPATIENT)
Dept: FAMILY PLANNING/WOMEN'S HEALTH CLINIC | Facility: IMMUNIZATION CENTER | Age: 66
End: 2021-03-31
Payer: COMMERCIAL

## 2021-03-31 DIAGNOSIS — Z23 ENCOUNTER FOR VACCINATION: Primary | ICD-10-CM

## 2021-03-31 PROCEDURE — 0001A PFIZER SARS-COV-2 VACCINE: CPT

## 2021-03-31 PROCEDURE — 91300 PFIZER SARS-COV-2 VACCINE: CPT

## 2021-04-22 ENCOUNTER — IMMUNIZATION (OUTPATIENT)
Dept: FAMILY PLANNING/WOMEN'S HEALTH CLINIC | Facility: IMMUNIZATION CENTER | Age: 66
End: 2021-04-22
Attending: INTERNAL MEDICINE
Payer: COMMERCIAL

## 2021-04-22 DIAGNOSIS — Z23 ENCOUNTER FOR VACCINATION: Primary | ICD-10-CM

## 2021-04-22 PROCEDURE — 91300 PFIZER SARS-COV-2 VACCINE: CPT

## 2021-04-22 PROCEDURE — 0002A PFIZER SARS-COV-2 VACCINE: CPT

## 2021-05-03 DIAGNOSIS — E03.9 HYPOTHYROIDISM, UNSPECIFIED TYPE: ICD-10-CM

## 2021-05-04 RX ORDER — THYROID,PORK 90 MG
90 TABLET ORAL
Qty: 90 TABLET | Refills: 3 | Status: SHIPPED | OUTPATIENT
Start: 2021-05-04 | End: 2021-05-12

## 2021-06-08 ENCOUNTER — HOSPITAL ENCOUNTER (OUTPATIENT)
Facility: MEDICAL CENTER | Age: 66
End: 2021-06-08
Attending: FAMILY MEDICINE
Payer: COMMERCIAL

## 2021-06-08 ENCOUNTER — OFFICE VISIT (OUTPATIENT)
Dept: MEDICAL GROUP | Facility: IMAGING CENTER | Age: 66
End: 2021-06-08
Payer: COMMERCIAL

## 2021-06-08 VITALS
BODY MASS INDEX: 23.78 KG/M2 | HEIGHT: 66 IN | WEIGHT: 148 LBS | OXYGEN SATURATION: 100 % | SYSTOLIC BLOOD PRESSURE: 114 MMHG | RESPIRATION RATE: 12 BRPM | HEART RATE: 76 BPM | TEMPERATURE: 97.3 F | DIASTOLIC BLOOD PRESSURE: 66 MMHG

## 2021-06-08 DIAGNOSIS — Z12.31 ENCOUNTER FOR SCREENING MAMMOGRAM FOR MALIGNANT NEOPLASM OF BREAST: ICD-10-CM

## 2021-06-08 DIAGNOSIS — T74.91XA DOMESTIC VIOLENCE OF ADULT, INITIAL ENCOUNTER: ICD-10-CM

## 2021-06-08 DIAGNOSIS — Z01.419 WELL WOMAN EXAM: ICD-10-CM

## 2021-06-08 DIAGNOSIS — E04.1 THYROID NODULE: ICD-10-CM

## 2021-06-08 DIAGNOSIS — Z79.890 CURRENT LONG-TERM USE OF POSTMENOPAUSAL HORMONE REPLACEMENT THERAPY: ICD-10-CM

## 2021-06-08 DIAGNOSIS — R92.8 ABNORMAL MAMMOGRAM: ICD-10-CM

## 2021-06-08 DIAGNOSIS — Z12.4 CERVICAL CANCER SCREENING: ICD-10-CM

## 2021-06-08 DIAGNOSIS — Z00.00 ANNUAL PHYSICAL EXAM: ICD-10-CM

## 2021-06-08 PROCEDURE — 88175 CYTOPATH C/V AUTO FLUID REDO: CPT

## 2021-06-08 PROCEDURE — 99396 PREV VISIT EST AGE 40-64: CPT | Performed by: FAMILY MEDICINE

## 2021-06-08 PROCEDURE — 87624 HPV HI-RISK TYP POOLED RSLT: CPT

## 2021-06-08 ASSESSMENT — PATIENT HEALTH QUESTIONNAIRE - PHQ9: CLINICAL INTERPRETATION OF PHQ2 SCORE: 0

## 2021-06-08 ASSESSMENT — FIBROSIS 4 INDEX: FIB4 SCORE: 1.12

## 2021-06-08 ASSESSMENT — PAIN SCALES - GENERAL: PAINLEVEL: NO PAIN
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
## 2021-06-08 NOTE — ASSESSMENT & PLAN NOTE
Patient had previously agreed to start tapering off of the medication.  She is now down to 0.025 mg estradiol patches once weekly.  With progesterone.  Patient can stop progesterone today.  Discussed wearing her estradiol patch for 3 days out of the week for 2 to 3 weeks then stopping the estradiol completely.

## 2021-06-09 LAB
CYTOLOGY REG CYTOL: NORMAL
HPV HR 12 DNA CVX QL NAA+PROBE: NEGATIVE
HPV16 DNA SPEC QL NAA+PROBE: NEGATIVE
HPV18 DNA SPEC QL NAA+PROBE: NEGATIVE
SPECIMEN SOURCE: NORMAL

## 2021-06-17 ENCOUNTER — HOSPITAL ENCOUNTER (OUTPATIENT)
Dept: RADIOLOGY | Facility: MEDICAL CENTER | Age: 66
End: 2021-06-17
Attending: FAMILY MEDICINE
Payer: COMMERCIAL

## 2021-06-17 DIAGNOSIS — E04.1 THYROID NODULE: ICD-10-CM

## 2021-06-17 PROCEDURE — 76536 US EXAM OF HEAD AND NECK: CPT

## 2021-10-12 ENCOUNTER — TELEPHONE (OUTPATIENT)
Dept: MEDICAL GROUP | Facility: PHYSICIAN GROUP | Age: 66
End: 2021-10-12

## 2021-10-12 NOTE — TELEPHONE ENCOUNTER
NEW PATIENT VISIT PRE-VISIT PLANNING    1.  E[employer]Care Patient is checked in Patient Demographics?Yes    2.  Immunizations were updated in Epic using Reconcile Outside Information activity? Yes         3.  Is this appointment scheduled as a Hospital Follow-Up? No    4.  Patient is due for the following Health Maintenance Topics:   Health Maintenance Due   Topic Date Due   • IMM INFLUENZA (1) 09/01/2021   • IMM PNEUMOCOCCAL VACCINE: 65+ Years (1 of 1 - PPSV23) Never done   • MAMMOGRAM  10/07/2021       5.  Reviewed/Updated the following with patient:       •   Preferred Pharmacy? Yes       •   Preferred Lab? Yes       •   Preferred Communication? Yes       •   Allergies? Yes       •   Medications? YES. Was Abstract Encounter opened and chart updated? YES       •   Social History? No       •   Family History (document living status of immediate family members and if + hx of  cancer, diabetes, hypertension, hyperlipidemia, heart attack, stroke) No    6.  Updated Care Team?       •   DME Company (gait device, O2, CPAP, etc.) NO       •   Other Specialists (eye doctor, derm, GYN, cardiology, endo, etc): NO    7.  AHA (Puls8) form printed for Provider? No, already completed

## 2021-10-13 ENCOUNTER — OFFICE VISIT (OUTPATIENT)
Dept: MEDICAL GROUP | Facility: PHYSICIAN GROUP | Age: 66
End: 2021-10-13
Payer: MEDICARE

## 2021-10-13 ENCOUNTER — HOSPITAL ENCOUNTER (OUTPATIENT)
Facility: MEDICAL CENTER | Age: 66
End: 2021-10-13
Attending: INTERNAL MEDICINE
Payer: MEDICARE

## 2021-10-13 VITALS
SYSTOLIC BLOOD PRESSURE: 112 MMHG | HEIGHT: 66 IN | BODY MASS INDEX: 23.63 KG/M2 | OXYGEN SATURATION: 97 % | RESPIRATION RATE: 12 BRPM | TEMPERATURE: 96.6 F | WEIGHT: 147 LBS | DIASTOLIC BLOOD PRESSURE: 60 MMHG | HEART RATE: 70 BPM

## 2021-10-13 DIAGNOSIS — M25.551 RIGHT HIP PAIN: ICD-10-CM

## 2021-10-13 DIAGNOSIS — E53.8 B12 DEFICIENCY: ICD-10-CM

## 2021-10-13 DIAGNOSIS — E04.1 THYROID NODULE: ICD-10-CM

## 2021-10-13 DIAGNOSIS — Z12.31 ENCOUNTER FOR SCREENING MAMMOGRAM FOR BREAST CANCER: ICD-10-CM

## 2021-10-13 DIAGNOSIS — E03.4 HYPOTHYROIDISM DUE TO ACQUIRED ATROPHY OF THYROID: ICD-10-CM

## 2021-10-13 DIAGNOSIS — R73.01 ELEVATED FASTING BLOOD SUGAR: ICD-10-CM

## 2021-10-13 DIAGNOSIS — R92.0 MICROCALCIFICATION OF LEFT BREAST ON MAMMOGRAM: ICD-10-CM

## 2021-10-13 DIAGNOSIS — Z23 NEED FOR VACCINATION: ICD-10-CM

## 2021-10-13 DIAGNOSIS — Z91.89 OTHER SPECIFIED PERSONAL RISK FACTORS, NOT ELSEWHERE CLASSIFIED: ICD-10-CM

## 2021-10-13 DIAGNOSIS — E78.5 DYSLIPIDEMIA: ICD-10-CM

## 2021-10-13 DIAGNOSIS — Z71.89 ADVANCE CARE PLANNING: ICD-10-CM

## 2021-10-13 DIAGNOSIS — E55.9 VITAMIN D DEFICIENCY: ICD-10-CM

## 2021-10-13 DIAGNOSIS — Z79.890 CURRENT LONG-TERM USE OF POSTMENOPAUSAL HORMONE REPLACEMENT THERAPY: ICD-10-CM

## 2021-10-13 PROCEDURE — 84439 ASSAY OF FREE THYROXINE: CPT

## 2021-10-13 PROCEDURE — 83036 HEMOGLOBIN GLYCOSYLATED A1C: CPT

## 2021-10-13 PROCEDURE — 84443 ASSAY THYROID STIM HORMONE: CPT

## 2021-10-13 PROCEDURE — 99000 SPECIMEN HANDLING OFFICE-LAB: CPT | Performed by: INTERNAL MEDICINE

## 2021-10-13 PROCEDURE — 80061 LIPID PANEL: CPT

## 2021-10-13 PROCEDURE — 80053 COMPREHEN METABOLIC PANEL: CPT

## 2021-10-13 PROCEDURE — 36415 COLL VENOUS BLD VENIPUNCTURE: CPT | Performed by: INTERNAL MEDICINE

## 2021-10-13 PROCEDURE — 84481 FREE ASSAY (FT-3): CPT

## 2021-10-13 PROCEDURE — 99215 OFFICE O/P EST HI 40 MIN: CPT | Mod: 25 | Performed by: INTERNAL MEDICINE

## 2021-10-13 PROCEDURE — 85025 COMPLETE CBC W/AUTO DIFF WBC: CPT

## 2021-10-13 PROCEDURE — G0008 ADMIN INFLUENZA VIRUS VAC: HCPCS | Performed by: INTERNAL MEDICINE

## 2021-10-13 PROCEDURE — 90662 IIV NO PRSV INCREASED AG IM: CPT | Performed by: INTERNAL MEDICINE

## 2021-10-13 ASSESSMENT — FIBROSIS 4 INDEX: FIB4 SCORE: 1.13

## 2021-10-13 NOTE — ASSESSMENT & PLAN NOTE
New problem, recommend she read through the advance care planning packet and bring her back to her follow-up in 2 to 3 weeks so we can get it completed.  She plans to name her  as her primary and her sister as her backup healthcare agent.

## 2021-10-13 NOTE — PROGRESS NOTES
Subjective:     CC:  Establish care    HISTORY OF THE PRESENT ILLNESS: Lidia Dior is a 66 y.o. female here today to establish primary medical care and discuss the below stated chronic medical conditions. Lidia is unaccompanied for today's visit.    Problem   Right Hip Pain    She reports approximately 1 year of right hip pain.  Is at the anterior aspect.  No radiation to the groin.  No sciatica symptoms.  No bursitis description on the lateral aspect.  Comes and goes.  She has been doing stretches following a program on [employer].  No prior imaging completed.     Elevated Fasting Blood Sugar       Ref. Range 2/11/2021 10:18   Glucose Latest Ref Range: 65 - 99 mg/dL 102 (H)     She has strong family history of type 2 diabetes.  She is very active and has a normal weight.  She had a small increase in her fasting blood sugar on last lab evaluation.  No prior hemoglobin A1c collected.       Thyroid Nodule    Thyroid US (6/2021):   Nodule #1  Location:  Right  lower  Size:  2.62 cm x 2.05 cm x 1.62 cm  Composition:  Spongiform-0  Echogenicity:  Anechoic-0  Shape:  Wider than tall-0  Margins:  Smooth-0  Echogenic Foci:  None-0     ACR TIRADS points/category:  0 - TR1 - Benign     Nodule #2  Location:  Right  mid  Size:  1.00 cm x 1.00 cm x 0.75 cm  Composition:  Solid-2  Echogenicity:  Hypoechoic-2  Shape:  Wider than tall-0  Margins:  Smooth-0  Echogenic Foci:  None-0     ACR TIRADS points/category:  4 - TR4 - Moderately Suspicious        IMPRESSION: Nodules as described above.   ACR TI-RADS Recommendations  TR4 - follow up ultrasound in 1,2,3 and 5 years    Neck thyroid ultrasound due June 2022.        Microcalcification of Left Breast On Mammogram    Noted on screening mammogram in October 2021, on follow-up diagnostic appear to be stable and also noted back in 2017.  6-month follow-up was recommended which patient did not complete, now due for annual mammogram.     Advance Care Planning    She has a blank  advance care planning packet at home.  Encouraged her to read through and bring it back in our next follow-up visit.  She think should have her  is her primary healthcare agent and her sister is her backup.     Dyslipidemia       Ref. Range 9/17/2019 09:37 2/11/2021 10:18   Cholesterol,Tot Latest Ref Range: 100 - 199 mg/dL 211 (H) 186   Triglycerides Latest Ref Range: 0 - 149 mg/dL 156 (H) 179 (H)   HDL Latest Ref Range: >=40 mg/dL 50 48   LDL Latest Ref Range: <100 mg/dL 130 (H) 102 (H)     The 10-year ASCVD risk score (Marcosjerrica AYERS Jr., et al., 2013) is: 4.4%    She was found to have a history of elevated cholesterol.  She is very active.  She has been able to bring this down with her healthy lifestyle.  She is curious whether she has any atherosclerosis and would like to obtain a coronary calcium score.     Current Long-Term Use of Postmenopausal Hormone Replacement Therapy    She was on HRT from 2673-3162 in the form of estradiol patch and oral progesterone.  She went off of this in June 2021 but notes since that time she has had return of menopausal symptoms including hot flashes as well as low energy, fatigue, poor sleep, etc.  She be interested in going back on some form of hormone replacement therapy.  Of note, last mammogram in fall 2020 demonstrated microcalcifications which have not yet been followed up.     Hypothyroidism       Ref. Range 2/11/2021 10:18   TSH Latest Ref Range: 0.380 - 5.330 uIU/mL 0.704     She reports diagnosis of hypothyroidism in 2008, she has been maintained on [place] Thyroid 90 mg daily with good results. She was also found to have a thyroid nodule on US in June 2021 that will require annual follow up.    Current regimen: [place] Thyroid 90 mg daily     Vitamin D Deficiency       Ref. Range 2/11/2021 10:18   25-Hydroxy   Vitamin D 25 Latest Ref Range: 30 - 100 ng/mL 44     Previous history of vitamin D deficiency,  Normalized now on vitamin D 5000 IU daily.       B12 Deficiency     "   Ref. Range 2/11/2021 10:18   Vitamin B12 -True Cobalamin Latest Ref Range: 211 - 911 pg/mL 494       She has history of B12 deficiency, currently on oral B12.  Previously received B12 injections dating back to 2016.          Current Medications:  Current Outpatient Medications Ordered in Epic   Medication Sig Dispense Refill   • ARMOUR THYROID 90 MG Tab TAKE 1 TABLET BY MOUTH EVERY MORNING ON AN EMPTY STOMACH 90 tablet 3   • Probiotic Product (PROBIOTIC PO)      • Multiple Vitamins-Minerals (HAIR SKIN AND NAILS FORMULA PO)      • Omega-3 Fatty Acids (FISH OIL PO) Take  by mouth.     • Ascorbic Acid (VITAMIN C-CANDIDA HIPS) 1000 MG Tab Take  by mouth.     • Cholecalciferol (VITAMIN D-3) 5000 units Tab Take  by mouth.     • Vitamin E 400 UNIT Tab Take  by mouth.     • B Complex Vitamins (B COMPLEX PO) Take  by mouth.     • CALCIUM-MAGNESIUM-ZINC PO Take  by mouth.       No current Epic-ordered facility-administered medications on file.       PMH, PSH, Social History, Medications, Allergies, FMH updated and reviewed as documented:    Objective:   Physical Exam:    Vitals: /60 (BP Location: Right arm, Patient Position: Sitting, BP Cuff Size: Adult)   Pulse 70   Temp 35.9 °C (96.6 °F) (Temporal)   Resp 12   Ht 1.664 m (5' 5.5\")   Wt 66.7 kg (147 lb)   SpO2 97%    BMI: Body mass index is 24.09 kg/m².  Physical Exam  Constitutional:       General: She is not in acute distress.     Appearance: Normal appearance. She is normal weight. She is not ill-appearing.   HENT:      Right Ear: Tympanic membrane and ear canal normal. There is no impacted cerumen.      Left Ear: Tympanic membrane and ear canal normal. There is no impacted cerumen.   Eyes:      General: No scleral icterus.     Conjunctiva/sclera: Conjunctivae normal.   Cardiovascular:      Rate and Rhythm: Normal rate and regular rhythm.      Pulses: Normal pulses.      Heart sounds: No murmur heard.     Pulmonary:      Effort: Pulmonary effort is normal. No " respiratory distress.      Breath sounds: Normal breath sounds. No wheezing or rhonchi.   Abdominal:      General: Bowel sounds are normal.      Palpations: Abdomen is soft.      Tenderness: There is no abdominal tenderness.   Musculoskeletal:      Right lower leg: No edema.      Left lower leg: No edema.   Lymphadenopathy:      Cervical: No cervical adenopathy.   Skin:     General: Skin is warm and dry.      Findings: No bruising or rash.   Neurological:      Gait: Gait normal.   Psychiatric:         Mood and Affect: Mood normal.         Behavior: Behavior normal.         Thought Content: Thought content normal.         Judgment: Judgment normal.          Assessment & Plan:   Lidia is a 66 y.o. female with the following:  Problem List Items Addressed This Visit     Current long-term use of postmenopausal hormone replacement therapy     Previously on HRT from 0625-7131, weaned herself off in June 2021 but would like to go back on due to resurgence of symptoms.  She had an abnormal mammogram in fall 2020 and this needs to be updated before we can even consider adding back estrogen.  She may benefit from meeting with Dr. Anaya to discuss bioidentical hormone replacement.         Hypothyroidism     Chronic and ongoing problem.  Will update thyroid labs to ensure stability.  Next thyroid ultrasound due in June 2022.  She would like to continue on [place] Thyroid 90 mg daily, will need to see what the cost will be as this is not on [facility] formulary.         Relevant Orders    TSH    FREE THYROXINE    T3 FREE    Vitamin D deficiency     Chronic and ongoing problem.  Continue vitamin D supplementation.         B12 deficiency     Previous problem, stable on oral replacement.  Continue over-the-counter oral vitamin B12.         Dyslipidemia     Chronic and ongoing problem.  Still with hypertriglyceridemia and slight elevation of LDL but overall improved.  Will update lipid panel and obtain coronary calcium score  for further risk stratification.         Relevant Orders    CBC WITH DIFFERENTIAL    Comp Metabolic Panel    Lipid Profile    Right hip pain     New problem by assessment but longstanding per patient for the past year.  Mild severity that flares up occasionally.  She is very active and would like to investigate further.  We will start with x-rays of the right hip joint and can discuss physical therapy if there are any signs of impingement osteoarthritis.         Relevant Orders    DX-HIP-COMPLETE - UNILATERAL 2+ RIGHT    Elevated fasting blood sugar     New problem, requires follow-up.  Will obtain hemoglobin A1c to ensure there is no prediabetes or type 2 diabetes.         Relevant Orders    HEMOGLOBIN A1C    Thyroid nodule     She has a thyroid nodule noted after she had fullness over her thyroid gland to complete an ultrasound.  Due to TR for designation follow-up ultrasounds at 1, 2, 3, and 5 years recommended.  Next thyroid ultrasound to be done in June 2022.         Microcalcification of left breast on mammogram     Chronic and ongoing problem, due for screening mammogram.  She was supposed to complete a 6-month follow-up mammogram in April 2021 but this was not done.         Advance care planning     New problem, recommend she read through the advance care planning packet and bring her back to her follow-up in 2 to 3 weeks so we can get it completed.  She plans to name her  as her primary and her sister as her backup healthcare agent.           Other Visit Diagnoses     Need for vaccination        Relevant Orders    Influenza Vaccine, High Dose (65+ Only) (Completed)    Other specified personal risk factors, not elsewhere classified        Relevant Orders    CT-CARDIAC SCORING    Encounter for screening mammogram for breast cancer        Relevant Orders    MA-SCREENING MAMMO BILAT W/TOMOSYNTHESIS W/CAD           RTC: Return in about 3 weeks (around 11/3/2021).    I spent a total of 45 minutes with  record review, exam, communication with the patient, communication with other providers, and documentation of this encounter.    PLEASE NOTE: This dictation was created using voice recognition software. I have made every reasonable attempt to correct obvious errors, but I expect that there are errors of grammar and possibly content that I did not discover before finalizing the note.      Lesa Garcia, DO  Geriatric and Internal Medicine  [facility]

## 2021-10-13 NOTE — ASSESSMENT & PLAN NOTE
New problem by assessment but longstanding per patient for the past year.  Mild severity that flares up occasionally.  She is very active and would like to investigate further.  We will start with x-rays of the right hip joint and can discuss physical therapy if there are any signs of impingement osteoarthritis.

## 2021-10-13 NOTE — ASSESSMENT & PLAN NOTE
Previously on HRT from 5715-6477, weaned herself off in June 2021 but would like to go back on due to resurgence of symptoms.  She had an abnormal mammogram in fall 2020 and this needs to be updated before we can even consider adding back estrogen.  She may benefit from meeting with Dr. Anaya to discuss bioidentical hormone replacement.

## 2021-10-13 NOTE — ASSESSMENT & PLAN NOTE
She has a thyroid nodule noted after she had fullness over her thyroid gland to complete an ultrasound.  Due to TR for designation follow-up ultrasounds at 1, 2, 3, and 5 years recommended.  Next thyroid ultrasound to be done in June 2022.

## 2021-10-13 NOTE — ASSESSMENT & PLAN NOTE
Chronic and ongoing problem.  Will update thyroid labs to ensure stability.  Next thyroid ultrasound due in June 2022.  She would like to continue on [place] Thyroid 90 mg daily, will need to see what the cost will be as this is not on [facility] formulary.

## 2021-10-13 NOTE — ASSESSMENT & PLAN NOTE
Chronic and ongoing problem, due for screening mammogram.  She was supposed to complete a 6-month follow-up mammogram in April 2021 but this was not done.

## 2021-10-13 NOTE — ASSESSMENT & PLAN NOTE
Chronic and ongoing problem.  Still with hypertriglyceridemia and slight elevation of LDL but overall improved.  Will update lipid panel and obtain coronary calcium score for further risk stratification.

## 2021-10-13 NOTE — ASSESSMENT & PLAN NOTE
New problem, requires follow-up.  Will obtain hemoglobin A1c to ensure there is no prediabetes or type 2 diabetes.

## 2021-10-14 ENCOUNTER — PATIENT MESSAGE (OUTPATIENT)
Dept: HEALTH INFORMATION MANAGEMENT | Facility: OTHER | Age: 66
End: 2021-10-14

## 2021-10-14 LAB
ALBUMIN SERPL BCP-MCNC: 4.6 G/DL (ref 3.2–4.9)
ALBUMIN/GLOB SERPL: 1.8 G/DL
ALP SERPL-CCNC: 74 U/L (ref 30–99)
ALT SERPL-CCNC: 16 U/L (ref 2–50)
ANION GAP SERPL CALC-SCNC: 11 MMOL/L (ref 7–16)
AST SERPL-CCNC: 22 U/L (ref 12–45)
BASOPHILS # BLD AUTO: 1 % (ref 0–1.8)
BASOPHILS # BLD: 0.04 K/UL (ref 0–0.12)
BILIRUB SERPL-MCNC: 0.6 MG/DL (ref 0.1–1.5)
BUN SERPL-MCNC: 17 MG/DL (ref 8–22)
CALCIUM SERPL-MCNC: 9.6 MG/DL (ref 8.5–10.5)
CHLORIDE SERPL-SCNC: 104 MMOL/L (ref 96–112)
CHOLEST SERPL-MCNC: 166 MG/DL (ref 100–199)
CO2 SERPL-SCNC: 25 MMOL/L (ref 20–33)
CREAT SERPL-MCNC: 0.62 MG/DL (ref 0.5–1.4)
EOSINOPHIL # BLD AUTO: 0.1 K/UL (ref 0–0.51)
EOSINOPHIL NFR BLD: 2.6 % (ref 0–6.9)
ERYTHROCYTE [DISTWIDTH] IN BLOOD BY AUTOMATED COUNT: 43.8 FL (ref 35.9–50)
EST. AVERAGE GLUCOSE BLD GHB EST-MCNC: 100 MG/DL
GLOBULIN SER CALC-MCNC: 2.6 G/DL (ref 1.9–3.5)
GLUCOSE SERPL-MCNC: 93 MG/DL (ref 65–99)
HBA1C MFR BLD: 5.1 % (ref 4–5.6)
HCT VFR BLD AUTO: 46.4 % (ref 37–47)
HDLC SERPL-MCNC: 44 MG/DL
HGB BLD-MCNC: 15.3 G/DL (ref 12–16)
IMM GRANULOCYTES # BLD AUTO: 0.01 K/UL (ref 0–0.11)
IMM GRANULOCYTES NFR BLD AUTO: 0.3 % (ref 0–0.9)
LDLC SERPL CALC-MCNC: 87 MG/DL
LYMPHOCYTES # BLD AUTO: 1.14 K/UL (ref 1–4.8)
LYMPHOCYTES NFR BLD: 29.5 % (ref 22–41)
MCH RBC QN AUTO: 31.4 PG (ref 27–33)
MCHC RBC AUTO-ENTMCNC: 33 G/DL (ref 33.6–35)
MCV RBC AUTO: 95.1 FL (ref 81.4–97.8)
MONOCYTES # BLD AUTO: 0.41 K/UL (ref 0–0.85)
MONOCYTES NFR BLD AUTO: 10.6 % (ref 0–13.4)
NEUTROPHILS # BLD AUTO: 2.16 K/UL (ref 2–7.15)
NEUTROPHILS NFR BLD: 56 % (ref 44–72)
NRBC # BLD AUTO: 0 K/UL
NRBC BLD-RTO: 0 /100 WBC
PLATELET # BLD AUTO: 150 K/UL (ref 164–446)
PMV BLD AUTO: 11.3 FL (ref 9–12.9)
POTASSIUM SERPL-SCNC: 4 MMOL/L (ref 3.6–5.5)
PROT SERPL-MCNC: 7.2 G/DL (ref 6–8.2)
RBC # BLD AUTO: 4.88 M/UL (ref 4.2–5.4)
SODIUM SERPL-SCNC: 140 MMOL/L (ref 135–145)
T3FREE SERPL-MCNC: 7.54 PG/ML (ref 2–4.4)
T4 FREE SERPL-MCNC: 1.35 NG/DL (ref 0.93–1.7)
TRIGL SERPL-MCNC: 174 MG/DL (ref 0–149)
TSH SERPL DL<=0.005 MIU/L-ACNC: 0.28 UIU/ML (ref 0.38–5.33)
WBC # BLD AUTO: 3.9 K/UL (ref 4.8–10.8)

## 2021-10-19 ENCOUNTER — APPOINTMENT (OUTPATIENT)
Dept: MEDICAL GROUP | Facility: PHYSICIAN GROUP | Age: 66
End: 2021-10-19
Payer: MEDICARE

## 2021-10-25 ENCOUNTER — HOSPITAL ENCOUNTER (OUTPATIENT)
Dept: RADIOLOGY | Facility: MEDICAL CENTER | Age: 66
End: 2021-10-25
Attending: INTERNAL MEDICINE
Payer: MEDICARE

## 2021-10-25 ENCOUNTER — HOSPITAL ENCOUNTER (OUTPATIENT)
Dept: RADIOLOGY | Facility: MEDICAL CENTER | Age: 66
End: 2021-10-25
Attending: FAMILY MEDICINE
Payer: MEDICARE

## 2021-10-25 DIAGNOSIS — M25.551 RIGHT HIP PAIN: ICD-10-CM

## 2021-10-25 DIAGNOSIS — M25.559 HIP PAIN: ICD-10-CM

## 2021-10-25 PROCEDURE — 73522 X-RAY EXAM HIPS BI 3-4 VIEWS: CPT

## 2021-10-26 ENCOUNTER — HOSPITAL ENCOUNTER (OUTPATIENT)
Dept: RADIOLOGY | Facility: MEDICAL CENTER | Age: 66
End: 2021-10-26
Attending: FAMILY MEDICINE
Payer: MEDICARE

## 2021-10-26 DIAGNOSIS — R92.1 BREAST CALCIFICATIONS: ICD-10-CM

## 2021-10-26 DIAGNOSIS — Z12.31 VISIT FOR SCREENING MAMMOGRAM: ICD-10-CM

## 2021-10-26 PROCEDURE — G0279 TOMOSYNTHESIS, MAMMO: HCPCS

## 2021-11-02 ENCOUNTER — TELEPHONE (OUTPATIENT)
Dept: MEDICAL GROUP | Facility: PHYSICIAN GROUP | Age: 66
End: 2021-11-02

## 2021-11-02 ENCOUNTER — TELEPHONE (OUTPATIENT)
Dept: HEALTH INFORMATION MANAGEMENT | Facility: OTHER | Age: 66
End: 2021-11-02

## 2021-11-02 NOTE — TELEPHONE ENCOUNTER
ESTABLISHED PATIENT PRE-VISIT PLANNING     Patient was NOT contacted to complete PVP.     Note: Patient will not be contacted if there is no indication to call.     1.  Reviewed notes from the last few office visits within the medical group: Yes    2.  If any orders were placed at last visit or intended to be done for this visit (i.e. 6 mos follow-up), do we have Results/Consult Notes?         •  Labs - Labs ordered, completed on 10/13/2021 and results are in chart.  Note: If patient appointment is for lab review and patient did not complete labs, check with provider if OK to reschedule patient until labs completed.       •  Imaging - Imaging ordered, NOT completed. Patient advised to complete prior to next appointment.       •  Referrals - No referrals were ordered at last office visit.    3. Is this appointment scheduled as a Hospital Follow-Up? No    4.  Immunizations were updated in Epic using Reconcile Outside Information activity? Yes    5.  Patient is due for the following Health Maintenance Topics:   Health Maintenance Due   Topic Date Due   • IMM PNEUMOCOCCAL VACCINE: 65+ Years (1 of 1 - PPSV23) Never done         6.  AHA (Pulse8) form printed for Provider? No, already completed

## 2021-11-03 ENCOUNTER — OFFICE VISIT (OUTPATIENT)
Dept: MEDICAL GROUP | Facility: PHYSICIAN GROUP | Age: 66
End: 2021-11-03
Payer: MEDICARE

## 2021-11-03 VITALS
SYSTOLIC BLOOD PRESSURE: 110 MMHG | BODY MASS INDEX: 23.77 KG/M2 | HEIGHT: 66 IN | HEART RATE: 76 BPM | OXYGEN SATURATION: 98 % | DIASTOLIC BLOOD PRESSURE: 60 MMHG | RESPIRATION RATE: 16 BRPM | TEMPERATURE: 97.6 F | WEIGHT: 147.9 LBS

## 2021-11-03 DIAGNOSIS — Z79.890 CURRENT LONG-TERM USE OF POSTMENOPAUSAL HORMONE REPLACEMENT THERAPY: ICD-10-CM

## 2021-11-03 DIAGNOSIS — R92.8 ABNORMAL MAMMOGRAM: ICD-10-CM

## 2021-11-03 PROCEDURE — 99214 OFFICE O/P EST MOD 30 MIN: CPT | Performed by: INTERNAL MEDICINE

## 2021-11-03 ASSESSMENT — FIBROSIS 4 INDEX: FIB4 SCORE: 2.38

## 2021-11-03 NOTE — PROGRESS NOTES
CC: Consult for [facility]  Patient of Dr. Garcia    HPI:  Lidia presents with the following    1. Current long-term use of postmenopausal hormone replacement therapy  Patient to discuss continuation of estradiol patch.  She has been on hormone replacement therapy since 2008.  Status post partial hysterectomy in 1996 due to fibroids.  Patient also takes oral progesterone to help with sleep and anxiety.  Patient discontinued her estradiol patch due to an abnormal mammogram.  Patient needs off of her estradiol and soon thereafter ProStrakan with severe hot flashes.  Patient would like to continue estradiol.  Patient denies any family history of breast cancer/uterine cancer/ovarian cancer.    Patient is also interested in resuming bioidentical testosterone supplementation.  [facility], patient was seeing a hormone specialist in [facility].  Patient was on testosterone for about 10 years.  Patient discontinued DHEA supplementation due to side effects of hair loss.  Since discontinuation of testosterone, patient's fatigue has increased, decreased muscle mass and more difficulty with recovery from exercise.    2. Abnormal mammogram  Most recent diagnostic mammogram showed calcifications in the upper outer quadrant of the left.  These microcalcifications were also noted on mammogram completed in October 2020.  No microcalcifications noted on mammogram in 2017.      Patient Active Problem List    Diagnosis Date Noted   • Abnormal mammogram 11/03/2021   • Right hip pain 10/13/2021   • Elevated fasting blood sugar 10/13/2021   • Thyroid nodule 10/13/2021   • Microcalcification of left breast on mammogram 10/13/2021   • Advance care planning 10/13/2021   • Dyslipidemia 10/03/2018   • Healthcare maintenance 07/16/2018   • Current long-term use of postmenopausal hormone replacement therapy 07/16/2018   • Hypothyroidism 07/16/2018   • Vitamin D deficiency 07/16/2018   • B12 deficiency 07/16/2018   •  Postsurgical menopause 07/16/2018       Current Outpatient Medications   Medication Sig Dispense Refill   • estradiol (CLIMARA) 0.025 MG/24HR PATCH WEEKLY Place 1 Patch on the skin every 7 days. 12 Patch 1   • ARMOUR THYROID 90 MG Tab TAKE 1 TABLET BY MOUTH EVERY MORNING ON AN EMPTY STOMACH 90 tablet 3   • Probiotic Product (PROBIOTIC PO)      • Multiple Vitamins-Minerals (HAIR SKIN AND NAILS FORMULA PO)      • Omega-3 Fatty Acids (FISH OIL PO) Take  by mouth.     • Ascorbic Acid (VITAMIN C-CANDIDA HIPS) 1000 MG Tab Take  by mouth.     • Cholecalciferol (VITAMIN D-3) 5000 units Tab Take  by mouth.     • Vitamin E 400 UNIT Tab Take  by mouth.     • B Complex Vitamins (B COMPLEX PO) Take  by mouth.     • CALCIUM-MAGNESIUM-ZINC PO Take  by mouth.       No current facility-administered medications for this visit.         Allergies as of 11/03/2021 - Reviewed 11/03/2021   Allergen Reaction Noted   • Aspirin Nausea 07/16/2018        Social History     Socioeconomic History   • Marital status:      Spouse name: Not on file   • Number of children: 1   • Years of education: Not on file   • Highest education level: Not on file   Occupational History   • Not on file   Tobacco Use   • Smoking status: Never Smoker   • Smokeless tobacco: Never Used   Vaping Use   • Vaping Use: Never used   Substance and Sexual Activity   • Alcohol use: Yes     Alcohol/week: 0.0 - 0.6 oz     Comment: glass of wine 1-2x /wk   • Drug use: No   • Sexual activity: Yes     Partners: Male     Birth control/protection: Post-Menopausal   Other Topics Concern   • Not on file   Social History Narrative    She is retired from [employer], retired in her early to mid 50s.  She is  to [place]berto.  She has a son who lives in [facility].  She likes to be active.  She relocated to [place] from [place] around 2018.     Social Determinants of Health     Financial Resource Strain:    • Difficulty of Paying Living Expenses:    Food Insecurity:    • Worried About Running  Out of Food in the Last Year:    • Ran Out of Food in the Last Year:    Transportation Needs:    • Lack of Transportation (Medical):    • Lack of Transportation (Non-Medical):    Physical Activity:    • Days of Exercise per Week:    • Minutes of Exercise per Session:    Stress:    • Feeling of Stress :    Social Connections:    • Frequency of Communication with Friends and Family:    • Frequency of Social Gatherings with Friends and Family:    • Attends Nondenominational Services:    • Active Member of Clubs or Organizations:    • Attends Club or Organization Meetings:    • Marital Status:    Intimate Partner Violence:    • Fear of Current or Ex-Partner:    • Emotionally Abused:    • Physically Abused:    • Sexually Abused:        Family History   Problem Relation Age of Onset   • Diabetes Mother         Adult onset, on insulin   • Arthritis Mother         RA   • Heart Disease Father         CHF   • Diabetes Sister         borderline   • Thyroid Sister         hypothyroid   • Diabetes Sister         borderline   • Diabetes Sister    • Diabetes Sister    • Diabetes Sister    • Other Son         AV valve repair - possibly genetic   • Hypertension Son        Past Surgical History:   Procedure Laterality Date   • OTHER SURGICAL PROCEDURE Left 10/09/2006    Varicose veins procedure in office   • MARKUS BY LAPAROSCOPY  07/31/2003    due to cholecystitis   • OTHER ORTHOPEDIC SURGERY Right 01/26/2001    slipped on ice. Pins in wrist   • HYSTERECTOMY LAPAROSCOPY  10/1995    partial (still has ovaries). Hx of fibroids/menorrhagia       ROS: Positive ROS per HPI.  Denies any Headache,Chest pain,  Shortness of breath,  Abdominal pain, Changes of bowel or bladder, Lower ext edema, Fevers, Nights sweats, Weight Changes, Focal weakness or numbness.  All other systems are negative.    /60 (BP Location: Left arm, Patient Position: Sitting, BP Cuff Size: Adult)   Pulse 76   Temp 36.4 °C (97.6 °F) (Temporal)   Resp 16   Ht 1.676 m  "(5' 6\")   Wt 67.1 kg (147 lb 14.4 oz)   SpO2 98%   BMI 23.87 kg/m²      Constitutional: Alert, no distress, well-groomed.  Skin: Warm, dry, good turgor, no rashes in visible areas.  Eye: Equal, round and reactive, conjunctiva clear, lids normal.  ENMT: Lips without lesions, good dentition, moist mucous membranes.  Neck: Trachea midline, no masses, no thyromegaly.  Respiratory: Unlabored respiratory effort, no cough.  Abdomen: Soft, no gross masses.  MSK: Normal gait, moves all extremities.  Neuro: Grossly non-focal. No cranial nerve deficit. Strength and sensation intact.   Psych: Alert and oriented x3, normal affect and mood.      Assessment and Plan.   66 y.o. female presenting with the following.     1. Current long-term use of postmenopausal hormone replacement therapy  Risks and benefits of BHRT discussed with patient.  Recommend black cohosh, evening primrose, passion flower for symptomatic relief.  Additional nonhormonal treatment options also discussed.  Discussed resuming estradiol patch at the lowest effective dose of 0.025 mg/24hr weekly.  Patient will bring in clinical documentation from her specialist in [place] for review.  Baseline labs will be ordered.    - ESTROGENS FRACTIONATED; Future  - ESTRADIOL; Future  - TESTOSTERONE F&T FEMALES/CHILD; Future  - PROGESTERONE, SERUM  - SEX HORMONE BINDING GLOBULIN; Future  - DHEA SULFATE; Future    2. Abnormal mammogram  Advised patient to complete diagnostic mammogram within 6 months for continued monitoring.  Again risks and benefits of hormone replacement therapy discussed with the patient.    My total time spent caring for the patient on the day of the encounter was 30 minutes.   This does not include time spent on separately billable procedures/tests.    "

## 2021-11-17 ENCOUNTER — HOSPITAL ENCOUNTER (OUTPATIENT)
Dept: LAB | Facility: MEDICAL CENTER | Age: 66
End: 2021-11-17
Attending: INTERNAL MEDICINE
Payer: MEDICARE

## 2021-11-17 DIAGNOSIS — Z79.890 CURRENT LONG-TERM USE OF POSTMENOPAUSAL HORMONE REPLACEMENT THERAPY: ICD-10-CM

## 2021-11-17 LAB
DHEA-S SERPL-MCNC: 50.1 UG/DL (ref 9.4–246)
ESTRADIOL SERPL-MCNC: <5 PG/ML
PROGEST SERPL-MCNC: 0.52 NG/ML

## 2021-11-17 PROCEDURE — 82627 DEHYDROEPIANDROSTERONE: CPT

## 2021-11-17 PROCEDURE — 84402 ASSAY OF FREE TESTOSTERONE: CPT

## 2021-11-17 PROCEDURE — 84144 ASSAY OF PROGESTERONE: CPT

## 2021-11-17 PROCEDURE — 82670 ASSAY OF TOTAL ESTRADIOL: CPT

## 2021-11-17 PROCEDURE — 36415 COLL VENOUS BLD VENIPUNCTURE: CPT

## 2021-11-17 PROCEDURE — 82671 ASSAY OF ESTROGENS: CPT

## 2021-11-17 PROCEDURE — 84270 ASSAY OF SEX HORMONE GLOBUL: CPT

## 2021-11-17 PROCEDURE — 84403 ASSAY OF TOTAL TESTOSTERONE: CPT

## 2021-11-21 LAB
ESTRADIOL SERPL HS-MCNC: 2.9 PG/ML
ESTRONE SERPL-MCNC: 10.6 PG/ML

## 2021-11-22 LAB
SHBG SERPL-SCNC: 49 NMOL/L (ref 30–135)
TESTOST FREE SERPL-MCNC: 1.8 PG/ML (ref 0.6–3.8)
TESTOST SERPL-MCNC: 14 NG/DL (ref 5–32)

## 2022-03-08 ENCOUNTER — OFFICE VISIT (OUTPATIENT)
Dept: MEDICAL GROUP | Facility: PHYSICIAN GROUP | Age: 67
End: 2022-03-08
Payer: MEDICARE

## 2022-03-08 VITALS
HEART RATE: 67 BPM | HEIGHT: 66 IN | SYSTOLIC BLOOD PRESSURE: 108 MMHG | BODY MASS INDEX: 24.43 KG/M2 | TEMPERATURE: 97.5 F | WEIGHT: 152 LBS | DIASTOLIC BLOOD PRESSURE: 70 MMHG | OXYGEN SATURATION: 97 % | RESPIRATION RATE: 14 BRPM

## 2022-03-08 DIAGNOSIS — E55.9 VITAMIN D DEFICIENCY: ICD-10-CM

## 2022-03-08 DIAGNOSIS — R00.2 PALPITATIONS: ICD-10-CM

## 2022-03-08 DIAGNOSIS — E78.5 DYSLIPIDEMIA: ICD-10-CM

## 2022-03-08 DIAGNOSIS — E53.8 B12 DEFICIENCY: ICD-10-CM

## 2022-03-08 DIAGNOSIS — Z23 NEED FOR VACCINATION: ICD-10-CM

## 2022-03-08 DIAGNOSIS — E03.9 HYPOTHYROIDISM, UNSPECIFIED TYPE: ICD-10-CM

## 2022-03-08 DIAGNOSIS — R73.01 ELEVATED FASTING BLOOD SUGAR: ICD-10-CM

## 2022-03-08 DIAGNOSIS — Z00.00 ENCOUNTER FOR SUBSEQUENT ANNUAL WELLNESS VISIT (AWV) IN MEDICARE PATIENT: Primary | ICD-10-CM

## 2022-03-08 DIAGNOSIS — D69.6 THROMBOCYTOPENIA (HCC): ICD-10-CM

## 2022-03-08 DIAGNOSIS — E04.1 THYROID NODULE: ICD-10-CM

## 2022-03-08 DIAGNOSIS — J30.9 ALLERGIC RHINITIS, UNSPECIFIED SEASONALITY, UNSPECIFIED TRIGGER: ICD-10-CM

## 2022-03-08 PROBLEM — Z79.890 CURRENT LONG-TERM USE OF POSTMENOPAUSAL HORMONE REPLACEMENT THERAPY: Status: RESOLVED | Noted: 2018-07-16 | Resolved: 2022-03-08

## 2022-03-08 PROBLEM — R92.8 ABNORMAL MAMMOGRAM: Status: RESOLVED | Noted: 2021-11-03 | Resolved: 2022-03-08

## 2022-03-08 PROCEDURE — 90732 PPSV23 VACC 2 YRS+ SUBQ/IM: CPT | Performed by: INTERNAL MEDICINE

## 2022-03-08 PROCEDURE — G0438 PPPS, INITIAL VISIT: HCPCS | Performed by: INTERNAL MEDICINE

## 2022-03-08 PROCEDURE — G0009 ADMIN PNEUMOCOCCAL VACCINE: HCPCS | Performed by: INTERNAL MEDICINE

## 2022-03-08 RX ORDER — FLUTICASONE PROPIONATE 50 MCG
1 SPRAY, SUSPENSION (ML) NASAL DAILY
Qty: 16 G | Refills: 2 | Status: SHIPPED | OUTPATIENT
Start: 2022-03-08 | End: 2022-11-22

## 2022-03-08 ASSESSMENT — FIBROSIS 4 INDEX: FIB4 SCORE: 2.38

## 2022-03-08 ASSESSMENT — ENCOUNTER SYMPTOMS: GENERAL WELL-BEING: FAIR

## 2022-03-08 ASSESSMENT — ACTIVITIES OF DAILY LIVING (ADL): BATHING_REQUIRES_ASSISTANCE: 0

## 2022-03-08 ASSESSMENT — PATIENT HEALTH QUESTIONNAIRE - PHQ9: CLINICAL INTERPRETATION OF PHQ2 SCORE: 0

## 2022-03-08 NOTE — ASSESSMENT & PLAN NOTE
New problem, start with 24 hour holter to assess for arrhythmia or PVCs and may need to consider KRYSTLE evaluation if continues to only bother her at night.

## 2022-03-08 NOTE — ASSESSMENT & PLAN NOTE
Previous problem, follow up A1c reassuring, continue with periodic evaluation. Strong family history of DM.

## 2022-03-08 NOTE — LETTER
[facility]  Lesa Garcia D.O.  [address] [place] [address]  [place] [address]  Fax: [phone]   Authorization for Release/Disclosure of   Protected Health Information   Name: LIDIA DIOR : 1955 SSN: [id number]   Address: [address] [place] [address] [place] [address] Phone:    [phone] (home)    I authorize the entity listed below to release/disclose the PHI below to:   [facility]/Lesa Garcia D.O. and Lesa Garcia D.O.   Provider or Entity Name:     Address   City, State, Zip   Phone:      Fax:     Reason for request: continuity of care   Information to be released:    [  ] LAST COLONOSCOPY,  including any PATH REPORT and follow-up  [  ] LAST FIT/COLOGUARD RESULT [  ] LAST DEXA  [  ] LAST MAMMOGRAM  [  ] LAST PAP  [  ] LAST LABS [  ] RETINA EXAM REPORT  [  ] IMMUNIZATION RECORDS  [ x ] Release all info      [ x ] Check here and initial the line next to each item to release ALL health information INCLUDING  _____ Care and treatment for drug and / or alcohol abuse  _____ HIV testing, infection status, or AIDS  _____ Genetic Testing    DATES OF SERVICE OR TIME PERIOD TO BE DISCLOSED: _-__________  I understand and acknowledge that:  * This Authorization may be revoked at any time by you in writing, except if your health information has already been used or disclosed.  * Your health information that will be used or disclosed as a result of you signing this authorization could be re-disclosed by the recipient. If this occurs, your re-disclosed health information may no longer be protected by State or Federal laws.  * You may refuse to sign this Authorization. Your refusal will not affect your ability to obtain treatment.  * This Authorization becomes effective upon signing and will  on (date) __________.      If no date is indicated, this Authorization will  one (1) year from the signature date.    Name: Lidia Dior    Signature:   Date:     3/8/2022        PLEASE FAX REQUESTED RECORDS BACK TO: [phone]

## 2022-03-08 NOTE — ASSESSMENT & PLAN NOTE
Chronic and ongoing problem, continues to feel well and would like to continue current dose of [place] thyroid 90 mg daily.

## 2022-03-08 NOTE — ASSESSMENT & PLAN NOTE
Previous problem, requires follow up, recheck thyroid US in June 2022 and consider endocrine referral at that time if there have been any significant changes.

## 2022-03-08 NOTE — PROGRESS NOTES
Chief Complaint   Patient presents with   • Annual Wellness Visit         HPI:  Lidia Dior is a 66 y.o. female here for Medicare Annual Wellness Visit     Problem   Palpitations    She reports insidious development of nocturnal palpitations. Occur in the evening when she is lying in bed. She does not have a pulse oximeter and has not checked her pulse rate at the time of palpitations to see if there is associated tachycardia. Does not notice it during the day.     Allergic Rhinitis    She reports challenges with sinus drainage and PND. History of deviated septum. Uses nettipot day with sinus massage in the morning due to congestion. Has not tried regular use of nasal rinses otherwise. Has not met with ENT. She has associated cerumen impaction of right ear canal.     Thrombocytopenia (Hcc)       Ref. Range 10/13/2021 10:13   Platelet Count Latest Ref Range: 164 - 446 K/uL 150 (L)     New incidental finding, no bruising or bleeding issues, not taking aspirin.     Elevated Fasting Blood Sugar       Ref. Range 2/11/2021 10:18   Glucose Latest Ref Range: 65 - 99 mg/dL 102 (H)      Ref. Range 10/13/2021 07:45   Glycohemoglobin Latest Ref Range: 4.0 - 5.6 % 5.1   Estim. Avg Glu Latest Units: mg/dL 100     She has strong family history of type 2 diabetes.  She is very active and has a normal weight.  She had a small increase in her fasting blood sugar on last lab evaluation. Follow up A1c stable.       Thyroid Nodule    Thyroid US (6/2021):   Nodule #1  Location:  Right  lower  Size:  2.62 cm x 2.05 cm x 1.62 cm  Composition:  Spongiform-0  Echogenicity:  Anechoic-0  Shape:  Wider than tall-0  Margins:  Smooth-0  Echogenic Foci:  None-0     ACR TIRADS points/category:  0 - TR1 - Benign     Nodule #2  Location:  Right  mid  Size:  1.00 cm x 1.00 cm x 0.75 cm  Composition:  Solid-2  Echogenicity:  Hypoechoic-2  Shape:  Wider than tall-0  Margins:  Smooth-0  Echogenic Foci:  None-0     ACR TIRADS points/category:  4 -  TR4 - Moderately Suspicious        IMPRESSION: Nodules as described above.   ACR TI-RADS Recommendations  TR4 - follow up ultrasound in 1,2,3 and 5 years    Neck thyroid ultrasound due June 2022.        Dyslipidemia       Ref. Range 10/13/2021 10:13   Cholesterol,Tot Latest Ref Range: 100 - 199 mg/dL 166   Triglycerides Latest Ref Range: 0 - 149 mg/dL 174 (H)   HDL Latest Ref Range: >=40 mg/dL 44   LDL Latest Ref Range: <100 mg/dL 87     The 10-year ASCVD risk score ([place] ANDRIA Jr., et al., 2013) is: 4%    She was found to have a history of elevated cholesterol.  She is very active.  She has been able to bring this down with her healthy lifestyle.  She is curious whether she has any atherosclerosis and would like to obtain a coronary calcium score.     Hypothyroidism       Ref. Range 10/13/2021 10:13   TSH Latest Ref Range: 0.380 - 5.330 uIU/mL 0.280 (L)   Free T-4 Latest Ref Range: 0.93 - 1.70 ng/dL 1.35   T3,Free Latest Ref Range: 2.00 - 4.40 pg/mL 7.54 (H)     She reports diagnosis of hypothyroidism in 2008, she has been maintained on [place] Thyroid 90 mg daily with good results. She was also found to have a thyroid nodule on US in June 2021 that will require annual follow up in June 2022.    Current regimen: [place] Thyroid 90 mg daily     Vitamin D Deficiency       Ref. Range 2/11/2021 10:18   25-Hydroxy   Vitamin D 25 Latest Ref Range: 30 - 100 ng/mL 44     Previous history of vitamin D deficiency,  Normalized now on vitamin D 5000 IU daily.       B12 Deficiency       Ref. Range 2/11/2021 10:18   Vitamin B12 -True Cobalamin Latest Ref Range: 211 - 911 pg/mL 494       She has history of B12 deficiency, currently on oral B12.  Previously received B12 injections dating back to 2016.       Current supplements as per medication list.       Allergies: Aspirin    Current social contact/activities: Walking with , friends to walk or hike, lunch, bible study      She  reports that she has never smoked. She has never  used smokeless tobacco. She reports current alcohol use. She reports that she does not use drugs.  Counseling given: Not Answered      DPA/Advanced Directive:  Patient has Advanced Directive on file.     ROS:    Gait: Uses no assistive device  Ostomy: No  Other tubes: No  Amputations: No  Chronic oxygen use: No  Last eye exam: 12/2021  Wears hearing aids: No   : Denies any urinary leakage during the last 6 months  Annual Health Assessment Questions:    1.  Are you currently engaging in any exercise or physical activity? Yes    2.  How would you describe your mood or emotional well-being today? good    3.  Have you had any falls in the last year? No    4.  Have you noticed any problems with your balance or had difficulty walking? Yes     5.  In the last six months have you experienced any leakage of urine? No    6. DPA/Advanced Directive: Patient has Advanced Directive on file.     Screening:  Annual Exam   Depression Screening  Little interest or pleasure in doing things?  0 - not at all  Feeling down, depressed , or hopeless? 0 - not at all  Patient Health Questionnaire Score: 0     If depressive symptoms identified deferred to follow up visit unless specifically addressed in assessment and plan.    Interpretation of PHQ-9 Total Score   Score Severity   1-4 No Depression   5-9 Mild Depression   10-14 Moderate Depression   15-19 Moderately Severe Depression   20-27 Severe Depression    Screening for Cognitive Impairment  Three Minute Recall (daughter, heaven, mountain) 3/3    Jose Roberto clock face with all 12 numbers and set the hands to show 10 past 11.  Yes    Cognitive concerns identified deferred for follow up unless specifically addressed in assessment and plan.    Fall Risk Assessment  Has the patient had two or more falls in the last year or any fall with injury in the last year?  No    Safety Assessment  Throw rugs on floor.  Yes  Handrails on all stairs.  Yes  Good lighting in all hallways.  Yes  Difficulty  hearing.  No  Patient counseled about all safety risks that were identified.    Functional Assessment ADLs  Are there any barriers preventing you from cooking for yourself or meeting nutritional needs?  No.    Are there any barriers preventing you from driving safely or obtaining transportation?  No.    Are there any barriers preventing you from using a telephone or calling for help?  No.    Are there any barriers preventing you from shopping?  No.    Are there any barriers preventing you from taking care of your own finances?  No.    Are there any barriers preventing you from managing your medications?  No.    Are there any barriers preventing you from showering, bathing or dressing yourself?  No.    Are you currently engaging in any exercise or physical activity?  Yes.  Walking, workout at the gym, weights, hike, snow shoeing   What is your perception of your health?  Fair.      Health Maintenance Summary          Scheduled - MAMMOGRAM (Yearly) Scheduled for 3/16/2022    10/26/2021  MA-DIAGNOSTIC MAMMO BILAT W/TOMOSYNTHESIS W/CAD    10/13/2020  MA-DIAGNOSTIC MAMMO LEFT W/O CAD    10/07/2020  MA-SCREENING MAMMO BILAT W/TOMOSYNTHESIS W/CAD    11/15/2017  [id number]    12/23/2014  [id number]    Only the first 5 history entries have been loaded, but more history exists.          BONE DENSITY (Every 5 Years) Tentatively due on 8/15/2023    08/15/2018  DS-BONE DENSITY STUDY (DEXA)    10/15/2015  DS-BONE DENSITY STUDY (DEXA)          IMM DTaP/Tdap/Td Vaccine (2 - Td or Tdap) Next due on 10/2/2028    10/02/2018  Imm Admin: Tdap Vaccine          COLORECTAL CANCER SCREENING (COLONOSCOPY - Every 10 Years) Tentatively due on 9/24/2030 09/24/2020  REFERRAL TO GI FOR COLONOSCOPY          IMM ZOSTER VACCINES (Series Information) Completed    12/27/2020  Imm Admin: Zoster Vaccine Recombinant (RZV) (SHINGRIX)    08/31/2020  Imm Admin: Zoster Vaccine Recombinant (RZV) (SHINGRIX)    07/16/2011  Imm Admin:  Zoster Vaccine Live (ZVL) (Zostavax) - HISTORICAL DATA          HEPATITIS C SCREENING  Completed    02/11/2021  HEP C VIRUS ANTIBODY          IMM INFLUENZA (Series Information) Completed    10/13/2021  Imm Admin: Influenza Vaccine Adult HD    08/31/2020  Imm Admin: Influenza Vaccine Quad Inj (Pf)    10/21/2019  Imm Admin: Influenza Vaccine Quad Inj (Pf)    10/02/2018  Imm Admin: Influenza Vaccine Quad Inj (Pf)          COVID-19 Vaccine (Series Information) Completed    10/26/2021  Imm Admin: Pfizer SARS-CoV-2 Vaccine    04/22/2021  Imm Admin: Pfizer SARS-CoV-2 Vaccine    03/31/2021  Imm Admin: Pfizer SARS-CoV-2 Vaccine          Annual Wellness Visit  Completed    03/08/2022  Level of Service: ANNUAL WELLNESS VISIT-INCLUDES PPPS SUBSEQUE*          IMM PNEUMOCOCCAL VACCINE: 65+ Years (Series Information) Completed    03/08/2022  Imm Admin: Pneumococcal polysaccharide vaccine (PPSV-23)          IMM HEP B VACCINE (Series Information) Aged Out    No completion history exists for this topic.          IMM MENINGOCOCCAL VACCINE (MCV4) (Series Information) Aged Out    No completion history exists for this topic.                Patient Care Team:  Lesa Garcia D.O. as PCP - General (Internal Medicine)        Social History     Tobacco Use   • Smoking status: Never Smoker   • Smokeless tobacco: Never Used   Vaping Use   • Vaping Use: Never used   Substance Use Topics   • Alcohol use: Yes     Alcohol/week: 0.0 - 0.6 oz     Comment: glass of wine 1-2x /wk   • Drug use: No     Family History   Problem Relation Age of Onset   • Diabetes Mother         Adult onset, on insulin   • Arthritis Mother         RA   • Heart Disease Father         CHF   • Diabetes Sister         borderline   • Thyroid Sister         hypothyroid   • Diabetes Sister         borderline   • Diabetes Sister    • Diabetes Sister    • Diabetes Sister    • Other Son         AV valve repair - possibly genetic   • Hypertension Son      She  has a past  "medical history of Abnormal mammogram (11/3/2021), Current long-term use of postmenopausal hormone replacement therapy (7/16/2018), Dyslipidemia, Ovarian cyst (10/2005), Renal cyst (9/215), and Renal cyst (10/2008).   Past Surgical History:   Procedure Laterality Date   • OTHER SURGICAL PROCEDURE Left 10/09/2006    Varicose veins procedure in office   • MARKUS BY LAPAROSCOPY  07/31/2003    due to cholecystitis   • OTHER ORTHOPEDIC SURGERY Right 01/26/2001    slipped on ice. Pins in wrist   • HYSTERECTOMY LAPAROSCOPY  10/1995    partial (still has ovaries). Hx of fibroids/menorrhagia       Exam:   /70 (BP Location: Right arm, Patient Position: Sitting, BP Cuff Size: Adult)   Pulse 67   Temp 36.4 °C (97.5 °F) (Temporal)   Resp 14   Ht 1.676 m (5' 6\")   Wt 68.9 kg (152 lb)   SpO2 97%  Body mass index is 24.53 kg/m².      Physical Exam  Constitutional:       General: She is not in acute distress.     Appearance: Normal appearance. She is normal weight. She is not ill-appearing.   HENT:      Right Ear: There is impacted cerumen.      Left Ear: Tympanic membrane and ear canal normal. There is no impacted cerumen.   Eyes:      General: No scleral icterus.     Conjunctiva/sclera: Conjunctivae normal.   Cardiovascular:      Rate and Rhythm: Normal rate and regular rhythm.      Pulses: Normal pulses.   Pulmonary:      Effort: Pulmonary effort is normal. No respiratory distress.      Breath sounds: Normal breath sounds. No wheezing or rhonchi.   Musculoskeletal:      Right lower leg: No edema.      Left lower leg: No edema.   Skin:     General: Skin is warm and dry.      Findings: No bruising or rash.   Psychiatric:         Mood and Affect: Mood normal.         Behavior: Behavior normal.         Thought Content: Thought content normal.         Judgment: Judgment normal.          Assessment and Plan. The following treatment and monitoring plan is recommended:        Lidia is a 66 y.o. female with the " following:  Problem List Items Addressed This Visit     Hypothyroidism     Chronic and ongoing problem, continues to feel well and would like to continue current dose of [place] thyroid 90 mg daily.         Relevant Orders    TSH    FREE THYROXINE    T3 FREE    Vitamin D deficiency     Previous problem, continue supplementation and checks levels periodically to ensure stability.         Relevant Orders    VITAMIN D,25 HYDROXY    B12 deficiency     Previous problem, continue supplementation and update levels to ensure stability.         Relevant Orders    VITAMIN B12    Dyslipidemia     Chronic and stable problem, she continues to do an excellent job of improving this problem with healthy lifestyle. Encouraged her to complete CT cardiac score for additional risk stratification.          Relevant Orders    Comp Metabolic Panel    Lipid Profile    Elevated fasting blood sugar     Previous problem, follow up A1c reassuring, continue with periodic evaluation. Strong family history of DM.         Thyroid nodule     Previous problem, requires follow up, recheck thyroid US in June 2022 and consider endocrine referral at that time if there have been any significant changes.         Relevant Orders    US-THYROID    Palpitations     New problem, start with 24 hour holter to assess for arrhythmia or PVCs and may need to consider KRYSTLE evaluation if continues to only bother her at night.         Relevant Orders    Cardiac Event Monitor    Allergic rhinitis     New problem, a daily nuisance for her, continue saline rinses and trial flonase. Consider azelastine or ipratropium next. May ultimately need ENT referral for direct visualization/additional assessment.         Relevant Medications    fluticasone (FLONASE) 50 MCG/ACT nasal spray    Thrombocytopenia (HCC)     New problem, no clinical symptoms, recheck to see if still persists.         Relevant Orders    CBC WITH DIFFERENTIAL      Other Visit Diagnoses     Encounter for  subsequent annual wellness visit (AWV) in Medicare patient    -  Primary    Need for vaccination        Relevant Orders    Pneumoccocal Polysaccharide Vaccine 23-Valent =>1yo SQ/IM (Completed)           Services suggested: No services needed at this time  Health Care Screening: Age-appropriate preventive services recommended by USPTF and ACIP covered by Medicare were discussed today. Services ordered if indicated and agreed upon by the patient.  Referrals offered: Community-based lifestyle interventions to reduce health risks and promote self-management and wellness, fall prevention, nutrition, physical activity, tobacco-use cessation, weight loss, and mental health services as per orders if indicated.    Discussion today about general wellness and lifestyle habits:    · Prevent falls and reduce trip hazards; Cautioned about securing or removing rugs.  · Have a working fire alarm and carbon monoxide detector;   · Engage in regular physical activity and social activities     Follow-up: No follow-ups on file.    I spent a total of 45 minutes with record review, exam, communication with the patient, communication with other providers, and documentation of this encounter.       PLEASE NOTE: This dictation was created using voice recognition software. I have made every reasonable attempt to correct obvious errors, but I expect that there are errors of grammar and possibly content that I did not discover before finalizing the note.      Lesa Garcia, DO  Geriatric and Internal Medicine  [facility]

## 2022-03-08 NOTE — ASSESSMENT & PLAN NOTE
New problem, a daily nuisance for her, continue saline rinses and trial flonase. Consider azelastine or ipratropium next. May ultimately need ENT referral for direct visualization/additional assessment.

## 2022-03-08 NOTE — ASSESSMENT & PLAN NOTE
Chronic and stable problem, she continues to do an excellent job of improving this problem with healthy lifestyle. Encouraged her to complete CT cardiac score for additional risk stratification.

## 2022-03-11 ENCOUNTER — NON-PROVIDER VISIT (OUTPATIENT)
Dept: CARDIOLOGY | Facility: MEDICAL CENTER | Age: 67
End: 2022-03-11
Attending: INTERNAL MEDICINE
Payer: MEDICARE

## 2022-03-11 DIAGNOSIS — R00.2 PALPITATIONS: ICD-10-CM

## 2022-03-11 DIAGNOSIS — R00.1 BRADYCARDIA: ICD-10-CM

## 2022-03-11 DIAGNOSIS — I47.10 PAROXYSMAL SUPRAVENTRICULAR TACHYCARDIA (HCC): ICD-10-CM

## 2022-03-16 ENCOUNTER — HOSPITAL ENCOUNTER (OUTPATIENT)
Dept: RADIOLOGY | Facility: MEDICAL CENTER | Age: 67
End: 2022-03-16
Attending: INTERNAL MEDICINE
Payer: MEDICARE

## 2022-03-22 LAB — EKG IMPRESSION: NORMAL

## 2022-03-22 PROCEDURE — 93227 XTRNL ECG REC<48 HR R&I: CPT | Performed by: INTERNAL MEDICINE

## 2022-03-23 ENCOUNTER — TELEPHONE (OUTPATIENT)
Dept: MEDICAL GROUP | Facility: PHYSICIAN GROUP | Age: 67
End: 2022-03-23
Payer: MEDICARE

## 2022-03-23 NOTE — TELEPHONE ENCOUNTER
1. Caller Name: Lidia Dior                          Call Back Number: [phone] (home)         How would the patient prefer to be contacted with a response: Phone call OK to leave a detailed message    Called and spoke with Lidia   She thinks she might have been working out then.  That is her usual time to work out.  Ne symptoms pains or complaints at this time

## 2022-03-23 NOTE — TELEPHONE ENCOUNTER
----- Message from Lesa Garcia D.O. sent at 3/23/2022  2:20 PM PDT -----  Please call Lidia regarding recent heart monitor. Results were very reassuring, she maintained a normal rhythm throughout her analysis period. She had one episode of tachycardia at 3:40 PM, was she exercising at that time? When she reported the symptoms of tachycardia the corresponding rate/rhythm were normal.    Thanks

## 2022-04-27 ENCOUNTER — PATIENT MESSAGE (OUTPATIENT)
Dept: HEALTH INFORMATION MANAGEMENT | Facility: OTHER | Age: 67
End: 2022-04-27

## 2022-05-05 ENCOUNTER — OFFICE VISIT (OUTPATIENT)
Dept: DERMATOLOGY | Facility: IMAGING CENTER | Age: 67
End: 2022-05-05
Payer: MEDICARE

## 2022-05-05 DIAGNOSIS — L90.8 SKIN AGING: ICD-10-CM

## 2022-05-05 DIAGNOSIS — D18.01 CHERRY ANGIOMA: ICD-10-CM

## 2022-05-05 DIAGNOSIS — D22.9 NEVUS: ICD-10-CM

## 2022-05-05 DIAGNOSIS — Z12.83 SKIN CANCER SCREENING: ICD-10-CM

## 2022-05-05 DIAGNOSIS — L91.8 SKIN TAG: ICD-10-CM

## 2022-05-05 DIAGNOSIS — L60.3 NAIL DYSTROPHY: ICD-10-CM

## 2022-05-05 DIAGNOSIS — L81.4 LENTIGO: ICD-10-CM

## 2022-05-05 DIAGNOSIS — L82.1 SEBORRHEIC KERATOSIS: ICD-10-CM

## 2022-05-05 DIAGNOSIS — L81.9 HYPERPIGMENTATION: ICD-10-CM

## 2022-05-05 PROCEDURE — 99203 OFFICE O/P NEW LOW 30 MIN: CPT | Performed by: DERMATOLOGY

## 2022-05-05 NOTE — PROGRESS NOTES
CC:  establish care     Subjective: new patient here for skin exam.     Last skin exam 8 years ago     HPI:  Skin lesion    Location: left eyebrow   Time present:  8 years   Painful lesion: No  Itching lesion: No  Enlarging lesion: Yes  Anything make it better or worse?no     Spot on right leg, prev trx with LN2 - recurred. Bothered by site, rubs on clothing and desires removal.     Skin tag on right neck. Brown spots on arms, chest, desires trx    Pigments with trauma - has used HQ in past, retinoid in past. sunprotects aggressively to minimize pigmentation.  intersted in peels to treat    History of skin cancer: No  History of biopsies:No  History of blistering/severe sunburns:No  Family history of skin cancer:No  Family history of atypical moles:No    ROS: no fevers/chills. No itch.  No cough  Relevant PMH: allergies; thyroid dz  Social: NS    PE: Gen:WDWN female in NAD. Skin: Scalp/face/eyes/lips/neck/chest/back/arms/legs/hands/feet/buttocks - without suspicious lesions noted.  Genitals exam declined  -waxy papule on left eyebrow, left arm, upper left chest   -hyperpigmentation lower face  -st, right neck  -scattered tan macules on chest and extremities, appearing benign  -vascular papule on right medial leg, appearing benign  -nail ridges      A/P: median nail dystrophy, trauma:  -minimized trauma    Nevi: benign appearing:  -Reviewed ABCDEs  -Reviewed skin cancer detection/prevention  -RTC PRN growth/changes/concerning features    ST/Lentigos/SKs: benign  -reassurance  -reviewed skin cancer detection/prevention  -counseled on cosmetic treatment PRN - to schedule PRN    Anti-aging face - hyperpigmentation - cont sunprotection, reviewed with patient  -Angie cosmetics PRN  -advised caution with HQ containing medicaments. Consider kojic acid alt    Cherry angioma: benign right lower leg  -counseled on cosmetic treatment PRN  -laser once available again    F/u 1-2 years/PRN skin check    I have reviewed medications  relevant to my specialty.

## 2022-05-09 ENCOUNTER — HOSPITAL ENCOUNTER (OUTPATIENT)
Dept: RADIOLOGY | Facility: MEDICAL CENTER | Age: 67
End: 2022-05-09
Attending: FAMILY MEDICINE
Payer: MEDICARE

## 2022-05-09 DIAGNOSIS — R92.8 ABNORMAL MAMMOGRAM: ICD-10-CM

## 2022-05-09 PROCEDURE — G0279 TOMOSYNTHESIS, MAMMO: HCPCS

## 2022-06-01 ENCOUNTER — HOSPITAL ENCOUNTER (OUTPATIENT)
Dept: RADIOLOGY | Facility: MEDICAL CENTER | Age: 67
End: 2022-06-01
Attending: INTERNAL MEDICINE
Payer: MEDICARE

## 2022-06-01 DIAGNOSIS — E04.1 THYROID NODULE: ICD-10-CM

## 2022-06-01 PROCEDURE — 76536 US EXAM OF HEAD AND NECK: CPT

## 2022-06-06 ENCOUNTER — TELEPHONE (OUTPATIENT)
Dept: HEALTH INFORMATION MANAGEMENT | Facility: OTHER | Age: 67
End: 2022-06-06

## 2022-06-30 ENCOUNTER — HOSPITAL ENCOUNTER (OUTPATIENT)
Dept: LAB | Facility: MEDICAL CENTER | Age: 67
End: 2022-06-30
Attending: INTERNAL MEDICINE
Payer: MEDICARE

## 2022-06-30 DIAGNOSIS — E78.5 DYSLIPIDEMIA: ICD-10-CM

## 2022-06-30 DIAGNOSIS — E55.9 VITAMIN D DEFICIENCY: ICD-10-CM

## 2022-06-30 DIAGNOSIS — E03.9 HYPOTHYROIDISM, UNSPECIFIED TYPE: ICD-10-CM

## 2022-06-30 DIAGNOSIS — D69.6 THROMBOCYTOPENIA (HCC): ICD-10-CM

## 2022-06-30 DIAGNOSIS — E53.8 B12 DEFICIENCY: ICD-10-CM

## 2022-06-30 LAB
25(OH)D3 SERPL-MCNC: 54 NG/ML (ref 30–100)
ALBUMIN SERPL BCP-MCNC: 4.7 G/DL (ref 3.2–4.9)
ALBUMIN/GLOB SERPL: 1.9 G/DL
ALP SERPL-CCNC: 104 U/L (ref 30–99)
ALT SERPL-CCNC: 25 U/L (ref 2–50)
ANION GAP SERPL CALC-SCNC: 13 MMOL/L (ref 7–16)
AST SERPL-CCNC: 24 U/L (ref 12–45)
BASOPHILS # BLD AUTO: 1.4 % (ref 0–1.8)
BASOPHILS # BLD: 0.06 K/UL (ref 0–0.12)
BILIRUB SERPL-MCNC: 0.7 MG/DL (ref 0.1–1.5)
BUN SERPL-MCNC: 15 MG/DL (ref 8–22)
CALCIUM SERPL-MCNC: 9.5 MG/DL (ref 8.5–10.5)
CHLORIDE SERPL-SCNC: 104 MMOL/L (ref 96–112)
CHOLEST SERPL-MCNC: 191 MG/DL (ref 100–199)
CO2 SERPL-SCNC: 23 MMOL/L (ref 20–33)
CREAT SERPL-MCNC: 0.72 MG/DL (ref 0.5–1.4)
EOSINOPHIL # BLD AUTO: 0.08 K/UL (ref 0–0.51)
EOSINOPHIL NFR BLD: 1.8 % (ref 0–6.9)
ERYTHROCYTE [DISTWIDTH] IN BLOOD BY AUTOMATED COUNT: 39.8 FL (ref 35.9–50)
FASTING STATUS PATIENT QL REPORTED: NORMAL
GFR SERPLBLD CREATININE-BSD FMLA CKD-EPI: 92 ML/MIN/1.73 M 2
GLOBULIN SER CALC-MCNC: 2.5 G/DL (ref 1.9–3.5)
GLUCOSE SERPL-MCNC: 84 MG/DL (ref 65–99)
HCT VFR BLD AUTO: 43.6 % (ref 37–47)
HDLC SERPL-MCNC: 50 MG/DL
HGB BLD-MCNC: 15.5 G/DL (ref 12–16)
IMM GRANULOCYTES # BLD AUTO: 0 K/UL (ref 0–0.11)
IMM GRANULOCYTES NFR BLD AUTO: 0 % (ref 0–0.9)
LDLC SERPL CALC-MCNC: 113 MG/DL
LYMPHOCYTES # BLD AUTO: 1.12 K/UL (ref 1–4.8)
LYMPHOCYTES NFR BLD: 25.5 % (ref 22–41)
MCH RBC QN AUTO: 31.7 PG (ref 27–33)
MCHC RBC AUTO-ENTMCNC: 35.6 G/DL (ref 33.6–35)
MCV RBC AUTO: 89.2 FL (ref 81.4–97.8)
MONOCYTES # BLD AUTO: 0.46 K/UL (ref 0–0.85)
MONOCYTES NFR BLD AUTO: 10.5 % (ref 0–13.4)
NEUTROPHILS # BLD AUTO: 2.67 K/UL (ref 2–7.15)
NEUTROPHILS NFR BLD: 60.8 % (ref 44–72)
NRBC # BLD AUTO: 0 K/UL
NRBC BLD-RTO: 0 /100 WBC
PLATELET # BLD AUTO: 270 K/UL (ref 164–446)
PMV BLD AUTO: 10.4 FL (ref 9–12.9)
POTASSIUM SERPL-SCNC: 4 MMOL/L (ref 3.6–5.5)
PROT SERPL-MCNC: 7.2 G/DL (ref 6–8.2)
RBC # BLD AUTO: 4.89 M/UL (ref 4.2–5.4)
SODIUM SERPL-SCNC: 140 MMOL/L (ref 135–145)
T3FREE SERPL-MCNC: 5.54 PG/ML (ref 2–4.4)
T4 FREE SERPL-MCNC: 1.27 NG/DL (ref 0.93–1.7)
TRIGL SERPL-MCNC: 142 MG/DL (ref 0–149)
TSH SERPL DL<=0.005 MIU/L-ACNC: 0.24 UIU/ML (ref 0.38–5.33)
VIT B12 SERPL-MCNC: 685 PG/ML (ref 211–911)
WBC # BLD AUTO: 4.4 K/UL (ref 4.8–10.8)

## 2022-06-30 PROCEDURE — 80053 COMPREHEN METABOLIC PANEL: CPT

## 2022-06-30 PROCEDURE — 85025 COMPLETE CBC W/AUTO DIFF WBC: CPT

## 2022-06-30 PROCEDURE — 82306 VITAMIN D 25 HYDROXY: CPT

## 2022-06-30 PROCEDURE — 84443 ASSAY THYROID STIM HORMONE: CPT

## 2022-06-30 PROCEDURE — 36415 COLL VENOUS BLD VENIPUNCTURE: CPT

## 2022-06-30 PROCEDURE — 80061 LIPID PANEL: CPT

## 2022-06-30 PROCEDURE — 84439 ASSAY OF FREE THYROXINE: CPT

## 2022-06-30 PROCEDURE — 84481 FREE ASSAY (FT-3): CPT

## 2022-06-30 PROCEDURE — 82607 VITAMIN B-12: CPT

## 2022-07-07 ENCOUNTER — OFFICE VISIT (OUTPATIENT)
Dept: MEDICAL GROUP | Facility: PHYSICIAN GROUP | Age: 67
End: 2022-07-07
Payer: MEDICARE

## 2022-07-07 VITALS
OXYGEN SATURATION: 96 % | HEIGHT: 66 IN | RESPIRATION RATE: 12 BRPM | SYSTOLIC BLOOD PRESSURE: 100 MMHG | WEIGHT: 146 LBS | BODY MASS INDEX: 23.46 KG/M2 | HEART RATE: 76 BPM | DIASTOLIC BLOOD PRESSURE: 60 MMHG | TEMPERATURE: 96.7 F

## 2022-07-07 DIAGNOSIS — R22.32 LUMP OF LEFT WRIST: ICD-10-CM

## 2022-07-07 DIAGNOSIS — D72.819 LEUKOPENIA, UNSPECIFIED TYPE: ICD-10-CM

## 2022-07-07 DIAGNOSIS — M65.341 TRIGGER RING FINGER OF RIGHT HAND: ICD-10-CM

## 2022-07-07 DIAGNOSIS — L98.9 SKIN LESION: ICD-10-CM

## 2022-07-07 DIAGNOSIS — E78.5 DYSLIPIDEMIA: ICD-10-CM

## 2022-07-07 DIAGNOSIS — E03.9 HYPOTHYROIDISM, UNSPECIFIED TYPE: ICD-10-CM

## 2022-07-07 DIAGNOSIS — R74.8 ELEVATED ALKALINE PHOSPHATASE LEVEL: ICD-10-CM

## 2022-07-07 PROBLEM — D69.6 THROMBOCYTOPENIA (HCC): Status: RESOLVED | Noted: 2022-03-08 | Resolved: 2022-07-07

## 2022-07-07 PROBLEM — R73.01 ELEVATED FASTING BLOOD SUGAR: Status: RESOLVED | Noted: 2021-10-13 | Resolved: 2022-07-07

## 2022-07-07 PROCEDURE — 99214 OFFICE O/P EST MOD 30 MIN: CPT | Performed by: INTERNAL MEDICINE

## 2022-07-07 ASSESSMENT — FIBROSIS 4 INDEX: FIB4 SCORE: 1.155555555555555556

## 2022-07-07 NOTE — ASSESSMENT & PLAN NOTE
Chronic and stable problem.  Still with slightly low TSH and elevated free T3, free T3 is better.  Discussed that Jian Thyroid can sometimes have variable dosages which can fluctuate her lab levels. She has no signs or symptoms of overtreatment right now.

## 2022-07-07 NOTE — ASSESSMENT & PLAN NOTE
New problem, will refer her to hand surgery for additional evaluation. Can try topical voltaren/diclofenac in the meantime.

## 2022-07-07 NOTE — ASSESSMENT & PLAN NOTE
Chronic and ongoing problem, she will work on her lifestyle a bit more aggressively. Recheck in 6 months. Consider CT cardiac score for additional risk stratification.

## 2022-07-07 NOTE — PROGRESS NOTES
Subjective:   Chief Complaint/History of Present Illness:  Lidia Dior is a 66 y.o. female established patient who presents today to discuss medical problems as listed below. Lidia is unaccompanied for today's visit.    Problem   Skin Lesion    She notes skin lesions on her face, neck, and calf that she would like evaluated by dermatology, she is requesting referral.     Trigger Ring Finger of Right Hand    She reports gradual onset of flexion/stiffness of right hand 4th digit at the PIP joint line. She has associated stiffness in the morning that resolves after 20-30 minutes. She notes her mother had RA and several siblings and nieces has lupus.     Lump of Left Wrist    She reports 2 months of painful growth on the radial aspect of the left wrist.  It is approximately 1 x 1 mm, she feels like it is enlarging and the surrounding area is becoming more sore. She does not recall a specific injury but notes she can be clumsy at times so it's possible she injured the area. She would be interested in additional evaluation by hand surgery.     Elevated Alkaline Phosphatase Level     Latest Reference Range & Units 06/30/22 12:06   Alkaline Phosphatase 30 - 99 U/L 104 (H)     She has mild elevation of alkaline phosphatase level.  Previously running in the 80s.  No known liver disease.  She has slight arthritic changes in her hands.     Dyslipidemia     Latest Reference Range & Units 06/30/22 12:06   Cholesterol,Tot 100 - 199 mg/dL 191   Triglycerides 0 - 149 mg/dL 142   HDL >=40 mg/dL 50   LDL <100 mg/dL 113 (H)     The 10-year ASCVD risk score (Marcos DC Jr., et al., 2013) is: 3.5%    She was found to have a history of elevated cholesterol.  She is very active.  She has been able to bring this down with her healthy lifestyle.  She is curious whether she has any atherosclerosis and would like to obtain a coronary calcium score.     Hypothyroidism     Latest Reference Range & Units 06/30/22 12:06   TSH 0.380 - 5.330  "uIU/mL 0.240 (L)   Free T-4 0.93 - 1.70 ng/dL 1.27   T3,Free 2.00 - 4.40 pg/mL 5.54 (H)     She reports diagnosis of hypothyroidism in 2008, she has been maintained on [place] Thyroid 90 mg daily with good results. She was also found to have a thyroid nodule on US in June 2021, stable on follow up in June 2022.    Current regimen: [place] Thyroid 90 mg daily         Current Medications:  Current Outpatient Medications Ordered in Epic   Medication Sig Dispense Refill   • ARMOUR THYROID 90 MG Tab TAKE 1 TABLET BY MOUTH EVERY MORNING ON AN EMPTY STOMACH 100 Tablet 3   • fluticasone (FLONASE) 50 MCG/ACT nasal spray Administer 1 Spray into affected nostril(S) every day. 16 g 2   • Probiotic Product (PROBIOTIC PO)      • Multiple Vitamins-Minerals (HAIR SKIN AND NAILS FORMULA PO)      • Omega-3 Fatty Acids (FISH OIL PO) Take  by mouth.     • Cholecalciferol (VITAMIN D-3) 5000 units Tab Take  by mouth.     • Vitamin E 400 UNIT Tab Take  by mouth.     • B Complex Vitamins (B COMPLEX PO) Take  by mouth.     • CALCIUM-MAGNESIUM-ZINC PO Take  by mouth.       No current Epic-ordered facility-administered medications on file.          Objective:   Physical Exam:    Vitals: /60 (BP Location: Left arm, Patient Position: Sitting, BP Cuff Size: Adult)   Pulse 76   Temp 35.9 °C (96.7 °F) (Temporal)   Resp 12   Ht 1.676 m (5' 6\")   Wt 66.2 kg (146 lb)   SpO2 96%    BMI: Body mass index is 23.57 kg/m².  Physical Exam  Constitutional:       General: She is not in acute distress.     Appearance: Normal appearance. She is normal weight. She is not ill-appearing.   HENT:      Right Ear: There is impacted cerumen.      Left Ear: There is no impacted cerumen.   Eyes:      General: No scleral icterus.     Conjunctiva/sclera: Conjunctivae normal.   Cardiovascular:      Rate and Rhythm: Normal rate and regular rhythm.      Pulses: Normal pulses.   Pulmonary:      Effort: Pulmonary effort is normal. No respiratory distress.      Breath " sounds: Normal breath sounds. No wheezing or rhonchi.   Musculoskeletal:      Right lower leg: No edema.      Left lower leg: No edema.   Lymphadenopathy:      Cervical: No cervical adenopathy.   Skin:     Findings: No rash.      Comments: 1x1 mm round hard lump on left radial wrist   Neurological:      Gait: Gait normal.   Psychiatric:         Mood and Affect: Mood normal.         Behavior: Behavior normal.         Thought Content: Thought content normal.         Judgment: Judgment normal.          Assessment and Plan:   Lidia is a 66 y.o. female with the following:  Problem List Items Addressed This Visit     Hypothyroidism     Chronic and stable problem.  Still with slightly low TSH and elevated free T3, free T3 is better.  Discussed that [place] Thyroid can sometimes have variable dosages which can fluctuate her lab levels. She has no signs or symptoms of overtreatment right now.           Relevant Orders    TSH    FREE THYROXINE    T3 FREE    Dyslipidemia     Chronic and ongoing problem, she will work on her lifestyle a bit more aggressively. Recheck in 6 months. Consider CT cardiac score for additional risk stratification.           Relevant Orders    Lipid Profile    Skin lesion     New problem, refer to dermatology for additional evaluation and treatment recommendations.           Relevant Orders    Referral to Dermatology    Trigger ring finger of right hand     New problem, will refer her to hand surgery for additional evaluation. Can try topical voltaren/diclofenac in the meantime.           Relevant Orders    Referral to Hand Surgery    Lump of left wrist     New problem, slightly worsening, will refer to hand surgery for additional evaluation.           Relevant Orders    Referral to Hand Surgery    Elevated alkaline phosphatase level     New problem, mild severity, recheck with isoenzymes in 6 months.  Could be related to progression of arthritis.           Relevant Orders    ALKALINE PHOSPHATASE  ISOENZYMES          RTC: Return in about 6 months (around 1/7/2023).    I spent a total of 38 minutes with record review, exam, communication with the patient, communication with other providers, and documentation of this encounter.    PLEASE NOTE: This dictation was created using voice recognition software. I have made every reasonable attempt to correct obvious errors, but I expect that there are errors of grammar and possibly content that I did not discover before finalizing the note.      Lesa Garcia, DO  Geriatric and Internal Medicine  [facility]

## 2022-07-07 NOTE — ASSESSMENT & PLAN NOTE
New problem, mild severity, recheck with isoenzymes in 6 months.  Could be related to progression of arthritis.

## 2022-08-10 ENCOUNTER — HOSPITAL ENCOUNTER (EMERGENCY)
Facility: MEDICAL CENTER | Age: 67
End: 2022-08-10
Attending: EMERGENCY MEDICINE
Payer: MEDICARE

## 2022-08-10 ENCOUNTER — OFFICE VISIT (OUTPATIENT)
Dept: MEDICAL GROUP | Facility: PHYSICIAN GROUP | Age: 67
End: 2022-08-10
Payer: MEDICARE

## 2022-08-10 VITALS
BODY MASS INDEX: 23.81 KG/M2 | RESPIRATION RATE: 16 BRPM | OXYGEN SATURATION: 97 % | TEMPERATURE: 96.3 F | DIASTOLIC BLOOD PRESSURE: 82 MMHG | WEIGHT: 148.15 LBS | HEIGHT: 66 IN | SYSTOLIC BLOOD PRESSURE: 140 MMHG | HEART RATE: 76 BPM

## 2022-08-10 VITALS
HEART RATE: 63 BPM | HEIGHT: 66 IN | TEMPERATURE: 97.4 F | BODY MASS INDEX: 23.56 KG/M2 | RESPIRATION RATE: 12 BRPM | DIASTOLIC BLOOD PRESSURE: 72 MMHG | OXYGEN SATURATION: 97 % | WEIGHT: 146.6 LBS | SYSTOLIC BLOOD PRESSURE: 110 MMHG

## 2022-08-10 DIAGNOSIS — F32.2 CURRENT SEVERE EPISODE OF MAJOR DEPRESSIVE DISORDER WITHOUT PSYCHOTIC FEATURES WITHOUT PRIOR EPISODE (HCC): ICD-10-CM

## 2022-08-10 PROBLEM — F32.9 MAJOR DEPRESSIVE DISORDER: Status: ACTIVE | Noted: 2022-08-10

## 2022-08-10 LAB
AMPHET UR QL SCN: NEGATIVE
BARBITURATES UR QL SCN: NEGATIVE
BENZODIAZ UR QL SCN: NEGATIVE
BZE UR QL SCN: NEGATIVE
CANNABINOIDS UR QL SCN: NEGATIVE
METHADONE UR QL SCN: NEGATIVE
OPIATES UR QL SCN: NEGATIVE
OXYCODONE UR QL SCN: NEGATIVE
PCP UR QL SCN: NEGATIVE
POC BREATHALIZER: 0 PERCENT (ref 0–0.01)
PROPOXYPH UR QL SCN: NEGATIVE

## 2022-08-10 PROCEDURE — A9270 NON-COVERED ITEM OR SERVICE: HCPCS | Performed by: EMERGENCY MEDICINE

## 2022-08-10 PROCEDURE — 80307 DRUG TEST PRSMV CHEM ANLYZR: CPT

## 2022-08-10 PROCEDURE — 99214 OFFICE O/P EST MOD 30 MIN: CPT | Performed by: FAMILY MEDICINE

## 2022-08-10 PROCEDURE — 302970 POC BREATHALIZER: Performed by: EMERGENCY MEDICINE

## 2022-08-10 PROCEDURE — 700102 HCHG RX REV CODE 250 W/ 637 OVERRIDE(OP): Performed by: EMERGENCY MEDICINE

## 2022-08-10 PROCEDURE — 99285 EMERGENCY DEPT VISIT HI MDM: CPT

## 2022-08-10 RX ORDER — HALOPERIDOL 5 MG/1
5 TABLET ORAL ONCE
Status: COMPLETED | OUTPATIENT
Start: 2022-08-10 | End: 2022-08-10

## 2022-08-10 RX ADMIN — HALOPERIDOL 5 MG: 5 TABLET ORAL at 17:17

## 2022-08-10 ASSESSMENT — FIBROSIS 4 INDEX
FIB4 SCORE: 1.155555555555555556
FIB4 SCORE: 1.155555555555555556

## 2022-08-10 ASSESSMENT — PATIENT HEALTH QUESTIONNAIRE - PHQ9
CLINICAL INTERPRETATION OF PHQ2 SCORE: 6
5. POOR APPETITE OR OVEREATING: 2 - MORE THAN HALF THE DAYS
SUM OF ALL RESPONSES TO PHQ QUESTIONS 1-9: 24

## 2022-08-10 NOTE — PROGRESS NOTES
CC:   Chief Complaint   Patient presents with    Depression     Crying a lot for the past month. Family issue that has been bothering her.     Referral Needed     Psychiatrist or Therapist.          HPI:   Lidia presents today for evaluation of her depression..      She typically follows up with Dr. Garcia is currently out of the office.  She states that over the last month she has been feeling depressed and crying a lot.  States that she has been having some issues with her sister.  She did feel depressed approximately 8 years ago.  At that time she was able to manage the depression on her own without any medications or following up with specialist.  States that at that time she prayed.  Over the last month she has been feeling depressed and crying.  States that last week she was visiting her son and was distracted and felt better.  She continues to pray and does have thoughts suicidal ideations.  States that she does not have a plan but feels like she wants a way out and that her family would be better off without her.        No problem-specific Assessment & Plan notes found for this encounter.      Current Outpatient Medications Ordered in Epic   Medication Sig Dispense Refill    ARMOUR THYROID 90 MG Tab TAKE 1 TABLET BY MOUTH EVERY MORNING ON AN EMPTY STOMACH 100 Tablet 3    fluticasone (FLONASE) 50 MCG/ACT nasal spray Administer 1 Spray into affected nostril(S) every day. 16 g 2    Probiotic Product (PROBIOTIC PO)       Multiple Vitamins-Minerals (HAIR SKIN AND NAILS FORMULA PO)       Omega-3 Fatty Acids (FISH OIL PO) Take  by mouth.      Cholecalciferol (VITAMIN D-3) 5000 units Tab Take  by mouth.      Vitamin E 400 UNIT Tab Take  by mouth.      B Complex Vitamins (B COMPLEX PO) Take  by mouth.      CALCIUM-MAGNESIUM-ZINC PO Take  by mouth.       No current Epic-ordered facility-administered medications on file.       Past Medical History:   Diagnosis Date    Abnormal mammogram 11/3/2021    Current long-term  "use of postmenopausal hormone replacement therapy 7/16/2018    She was on HRT from 0105-0828 in the form of estradiol patch and oral progesterone.  She went off of this in June 2021 but notes since that time she has had return of menopausal symptoms including hot flashes as well as low energy, fatigue, poor sleep, etc.  She be interested in going back on some form of hormone replacement therapy.  Of note, last mammogram in fall 2020 demonstrated microcalcific    Dyslipidemia     Elevated fasting blood sugar 10/13/2021      Ref. Range 2/11/2021 10:18 Glucose Latest Ref Range: 65 - 99 mg/dL 102 (H)   Ref. Range 10/13/2021 07:45 Glycohemoglobin Latest Ref Range: 4.0 - 5.6 % 5.1 Estim. Avg Glu Latest Units: mg/dL 100  She has strong family history of type 2 diabetes.  She is very active and has a normal weight.  She had a small increase in her fasting blood sugar on last lab evaluation. Follow up A1c stable.     Ovarian cyst 10/2005    Right.    Renal cyst 9/215    Right - stable, possible stone    Renal cyst 10/2008    Left, simple 1.6cm. During ovarian US    Thrombocytopenia (HCC) 3/8/2022      Ref. Range 10/13/2021 10:13 Platelet Count Latest Ref Range: 164 - 446 K/uL 150 (L)  New incidental finding, no bruising or bleeding issues, not taking aspirin.       Social History     Tobacco Use    Smoking status: Never    Smokeless tobacco: Never   Vaping Use    Vaping Use: Never used   Substance Use Topics    Alcohol use: Yes     Alcohol/week: 0.0 - 0.6 oz     Comment: glass of wine 1-2x /wk    Drug use: No       Allergies:  Aspirin and Latex      Objective:       Exam:  /72   Pulse 63   Temp 36.3 °C (97.4 °F) (Temporal)   Resp 12   Ht 1.676 m (5' 6\")   Wt 66.5 kg (146 lb 9.6 oz)   SpO2 97%   BMI 23.66 kg/m²   Body mass index is 23.66 kg/m².  Wt Readings from Last 4 Encounters:   08/10/22 66.5 kg (146 lb 9.6 oz)   07/07/22 66.2 kg (146 lb)   03/08/22 68.9 kg (152 lb)   11/03/21 67.1 kg (147 lb 14.4 oz) "       Constitutional: Alert, tearful, well-groomed.  Skin: Warm, dry, good turgor, no rashes in visible areas.  Eye: conjunctiva clear, lids normal.  Neck: supple  Respiratory: Unlabored respiratory effort, no cough.  Cardio: No LE edema  Abdomen:  no gross masses.  MSK: Normal gait, moves all extremities.  Neuro: no tremors  Psych: Alert and oriented x3, tearful and sad.      Assessment & Plan:     66 y.o. female with the following -     1. Current severe episode of major depressive disorder without psychotic features without prior episode (HCC)  New issue. Not improving. Urgent referrals placed and called departments.  Unable to be seen until November.  NAMS # provided to patient.  D/w her that her phq9 score was 24 and that I am concerned about her well being.  She should go to the ED for further evaluation with psych. Feels like she would be better off not being here, doesn't have a plan, 'wants out'- . Agrees to go to ED after appt today.   Patient has been identified as having a positive depression screening. Appropriate orders and counseling have been given.  - Referral to Psychiatry  - Referral to Behavioral Health      I spent a total of 36 minutes with record review, exam, communication with the patient, communication with other providers, and documentation of this encounter.      No follow-ups on file.    Please note that this dictation was created using voice recognition software. I have made every reasonable attempt to correct obvious errors, but I expect that there are errors of grammar and possibly content that I did not discover before finalizing the note.

## 2022-08-10 NOTE — ED PROVIDER NOTES
"ED Provider  Scribed for Osmani Kirkland D.O. by Trung Obrien. 8/10/2022  4:40 PM    Means of arrival: walk in / sent by MD  History obtained from: patient  History limited by: none    CHIEF COMPLAINT  Chief Complaint   Patient presents with    Depression     Ongoing x 1 mon  Saw PMD and referred to ED  \" Family  issues \"    Suicidal Ideation     X 2 wks       HPI  Lidia Dior is a 66 y.o. female who presents to the ED for suicidal ideation onset two weeks ago. She was previously seen by her PCP today who referred her to the ED. She reports feelings of depression stemming from \"toxic\" family issues with her sister. Her and her  moved her four years ago to be with family but she states it has \"not been working out\". She denies any physical self-harm, but wishes to \"get rid of the pain\". If she were to hurt herself, she states she would drive off a [place], and \"is not able to get there thoughts out of her head\" but has not taken any action on these thoughts. She denies any history of depression or previous suicidal ideations. She also denies any previous treatment with medications or therapy.    REVIEW OF SYSTEMS  See HPI for further details. All other systems are negative.     PAST MEDICAL HISTORY   has a past medical history of Abnormal mammogram (11/3/2021), Current long-term use of postmenopausal hormone replacement therapy (7/16/2018), Dyslipidemia, Elevated fasting blood sugar (10/13/2021), Ovarian cyst (10/2005), Renal cyst (9/215), Renal cyst (10/2008), and Thrombocytopenia (HCC) (3/8/2022).    SOCIAL HISTORY  Social History     Tobacco Use    Smoking status: Never    Smokeless tobacco: Never   Vaping Use    Vaping Use: Never used   Substance and Sexual Activity    Alcohol use: Yes     Alcohol/week: 0.0 - 0.6 oz     Comment: glass of wine 1-2x /wk    Drug use: No    Sexual activity: Yes     Partners: Male     Birth control/protection: Post-Menopausal       SURGICAL HISTORY   has a past " "surgical history that includes hysterectomy laparoscopy (10/1995); other orthopedic surgery (Right, 01/26/2001); abbey by laparoscopy (07/31/2003); and other surgical procedure (Left, 10/09/2006).    CURRENT MEDICATIONS  Current Outpatient Medications   Medication Instructions    [place] Thyroid 90 mg, Oral, EACH MORNING ON EMPTY STOMACH    B Complex Vitamins (B COMPLEX PO) Oral    CALCIUM-MAGNESIUM-ZINC PO Oral    Cholecalciferol (VITAMIN D-3) 5000 units Tab Oral    fluticasone (FLONASE) 50 mcg, Nasal, DAILY    Multiple Vitamins-Minerals (HAIR SKIN AND NAILS FORMULA PO) No dose, route, or frequency recorded.    Omega-3 Fatty Acids (FISH OIL PO) Oral    Probiotic Product (PROBIOTIC PO) No dose, route, or frequency recorded.    Vitamin E 400 UNIT Tab Oral       ALLERGIES  Allergies   Allergen Reactions    Aspirin Unspecified     Upset stomach     Latex Hives, Itching, Rash and Swelling       PHYSICAL EXAM  VITAL SIGNS: BP (!) 140/79   Pulse 84   Temp 36.9 °C (98.4 °F) (Temporal)   Resp 16   Ht 1.676 m (5' 6\")   Wt 67.2 kg (148 lb 2.4 oz)   SpO2 94%   BMI 23.91 kg/m²   Constitutional: Alert in no apparent distress.  HENT: No signs of trauma, mucous membranes are moist  Eyes: Conjunctiva normal, Non-icteric.   Neck: Normal range of motion, No tenderness, Supple.  Lymphatic: No lymphadenopathy noted.   Cardiovascular: Regular rate and rhythm, no murmurs.   Thorax & Lungs: Normal breath sounds, No respiratory distress, No wheezing, No chest tenderness.   Abdomen: Bowel sounds normal, Soft, No tenderness, No masses, No pulsatile masses. No peritoneal signs.  Skin: Warm, Dry, normal color.   Back: No bony tenderness, No CVA tenderness.   Extremities: No edema, No tenderness, No cyanosis  Musculoskeletal: Good range of motion in all major joints. No tenderness to palpation or major deformities noted.   Neurologic: Alert and oriented x4, Normal motor function, Normal sensory function, No focal deficits noted. "   Psychiatric: Depressed, tearful, suicidal.      DIAGNOSTIC STUDIES / PROCEDURES    LABS  Results for orders placed or performed during the hospital encounter of 08/10/22   POC BREATHALIZER   Result Value Ref Range    POC Breathalizer 0.00 0.00 - 0.01 Percent       All labs reviewed by me.      COURSE  Pertinent Labs & Imaging studies reviewed. (See chart for details)    Review of past medical records shows the patient has no family history of suicide.    4:40 PM - Patient seen and examined at bedside. Discussed plan of care. The patient will be medicated with Haldol 5 mg. Ordered for ED consult to behavorial health, urine drug screen, and POC breathalizer to evaluate her symptoms.     4:54 PM - Alcohol level is zero, legal hold will be completed by me.    MEDICAL DECISION MAKING  This is a 66 y.o. female who presents with complaints of depression, she is tearful, and she is having suicidal thoughts with a consideration of driving her car off a [place].  She has not acted upon this yet.  She has no history of depression.  She is not on medication at this time.    She relates as depression related to her relationship with her sister, she moved to this area 4 months ago to be closer to family.    Her alcohol level is 0, the patient be given Haldol to assist with her depression and some mild anxiety and tearfulness.  She legal hold has been initiated.  She will be evaluated by life skills and be held in the emergency department until psychiatric evaluation is available    8/10/2022 1702 admit to ED observation diagnosis severe depression, suicidal ideation, will hold in ED observation on legal hold pending acceptance by psychiatric facility for appropriate treatment.      ED observation    FINAL IMPRESSION  1. Suicidal ideation    2. Current severe episode of major depressive disorder without psychotic features without prior episode (HCC)         Trung IZQUIERDO (Scribe), am scribing for, and in the presence of,  Osmani Kirkland D.O..    Electronically signed by: Trung Obrien (Scribe), 8/10/2022    I, Osmani Kirkland D.O. personally performed the services described in this documentation, as scribed by Trung Obrien in my presence, and it is both accurate and complete.    The note accurately reflects work and decisions made by me.  Osmani Kirkland D.O.  8/10/2022  5:03 PM

## 2022-08-10 NOTE — ED TRIAGE NOTES
"Pt SHEILAO Jesus jean Crying throughout triage  Pt states \"\" I was going to get in the car and drive off a christiano \" \" I'm in so much pain I can't take any more \"  "

## 2022-08-10 NOTE — ED NOTES
Water and tissues given  Pt in NAD   She is aware of POC and that she is now a legal hold  Sitter outside of room  1:1 observation status

## 2022-08-11 NOTE — DISCHARGE PLANNING
[facility]  Crisis/Safety Plan    Name:  Lidia Dior  MRN:  [id number]  Date:  8/10/2022    Warning signs that a crisis may be developing for me or I may be at risk:  1) Feeling lonely.  2) Crying.  3) Feeling hurt.    Coping strategies I can use on my own (relaxation, physical activity, etc):  1) Take a walk.  2) [place].  3) Self-care: nails, facial  4) Watch a movie.  5) Painting.    Ways I can make my environment safe:  1) Tell my  how I'm feeling so that he can be supportive.  2) Get a counselor to talk to.       Things I want to tell myself when I feel a crisis developin) I can ask God to help me out of this dark place.  2) God has always been there for me.  3) Emotions will pass. I won't always feel this way.    People I can contact for support or distraction (and their phone numbers):  1)   2) Laurie Sullivan, friend.      If I’m not able to reach my support people, or the above strategies don’t help, I can contact the following professionals, agencies, or hotlines:  1) Crisis Call Center ():  [phone] -OR- (138) [phone]  2) Crisis Text Line ():  Text CARE TO 845337  3) 988 mental health emergency line      Kayla Hair R.N.

## 2022-08-11 NOTE — PROGRESS NOTES
"ED Provider Progress Note    Patient:Lidia Dior  Patient : 1955  Patient MRN: [id number]  Patient PCP: Lesa Garcia D.O.    Admit Date: 8/10/2022  Discharge Date and Time: 08/10/22 6:58 PM  Discharge Diagnosis:   1. Current severe episode of major depressive disorder without psychotic features without prior episode (HCC)        Discharge Attending: Anabel Francisco M.D.  Discharge Service: ED Observation    ED Course  Lidia is a 66 y.o. female who was evaluated at [facility] for depression and suicidal thoughts.  She was given some Haldol and feels improved.  She discussed her problems with life skills and is no longer suicidal.  She states that she has support from her .  She will follow-up with psychiatry outpatient for her depression and return for any worsening feelings.  At this time we will discharge the patient home.  I discontinued the legal hold.    Discharge Exam:  BP (!) 140/79   Pulse 84   Temp 36.9 °C (98.4 °F) (Temporal)   Resp 16   Ht 1.676 m (5' 6\")   Wt 67.2 kg (148 lb 2.4 oz)   SpO2 94%   BMI 23.91 kg/m² .    Constitutional: Awake and alert. Nontoxic  HENT:  Grossly normal  Eyes: Grossly normal  Neck: Normal range of motion  Cardiovascular: Normal heart rate   Thorax & Lungs: No respiratory distress  Abdomen: Nontender  Skin:  No pathologic rash.   Extremities: Well perfused  Psychiatric: Affect depressed    Labs  Results for orders placed or performed during the hospital encounter of 08/10/22   Urine Drug Screen   Result Value Ref Range    Amphetamines Urine Negative Negative    Barbiturates Negative Negative    Benzodiazepines Negative Negative    Cocaine Metabolite Negative Negative    Methadone Negative Negative    Opiates Negative Negative    Oxycodone Negative Negative    Phencyclidine -Pcp Negative Negative    Propoxyphene Negative Negative    Cannabinoid Metab Negative Negative   POC BREATHALIZER   Result Value Ref Range    POC Breathalizer 0.00 " 0.00 - 0.01 Percent       Radiology  No orders to display         Medications:   New Prescriptions    No medications on file       Discharge Condition: Stable    Electronically signed by: Anabel Francisco M.D., 8/10/2022 6:58 PM
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
## 2022-08-11 NOTE — ED NOTES
Pt cleared for d/c  dischg instructions given to pt  Verbally understands  Referrals given and instructed to f/u w/ PCP for further care and treatment for her mental health  D/c'ed to home in [place]    Tried to call ins.  Office is closed spoke with Emelia Toscano who gave me a different number to contact [phone]

## 2022-08-21 ENCOUNTER — OFFICE VISIT (OUTPATIENT)
Dept: URGENT CARE | Facility: CLINIC | Age: 67
End: 2022-08-21
Payer: MEDICARE

## 2022-08-21 VITALS
RESPIRATION RATE: 16 BRPM | SYSTOLIC BLOOD PRESSURE: 116 MMHG | BODY MASS INDEX: 22.98 KG/M2 | WEIGHT: 143 LBS | HEART RATE: 77 BPM | TEMPERATURE: 98.5 F | HEIGHT: 66 IN | OXYGEN SATURATION: 94 % | DIASTOLIC BLOOD PRESSURE: 78 MMHG

## 2022-08-21 DIAGNOSIS — H69.92 EUSTACHIAN TUBE DYSFUNCTION, LEFT: ICD-10-CM

## 2022-08-21 PROCEDURE — 99213 OFFICE O/P EST LOW 20 MIN: CPT | Performed by: NURSE PRACTITIONER

## 2022-08-21 ASSESSMENT — FIBROSIS 4 INDEX: FIB4 SCORE: 1.155555555555555556

## 2022-08-21 NOTE — PROGRESS NOTES
Subjective     Lidia Dior is a 66 y.o. female who presents with Earache (X5 days, left ear pain possible ear infection )            Ear Fullness  This is a new problem. Episode onset: pt reports new onset of sensation of ear fullness that started a few days ago. Ear feels plugged, like there is fluid in her ear. no significant pain. no drainage. No recent fevers or other URI sxs. Treatments tried: OTC ear drops. The treatment provided no relief.     Review of Systems   HENT:          Left plugged ear sensation   All other systems reviewed and are negative.       Past Medical History:   Diagnosis Date    Abnormal mammogram 11/3/2021    Current long-term use of postmenopausal hormone replacement therapy 7/16/2018    She was on HRT from 4676-1353 in the form of estradiol patch and oral progesterone.  She went off of this in June 2021 but notes since that time she has had return of menopausal symptoms including hot flashes as well as low energy, fatigue, poor sleep, etc.  She be interested in going back on some form of hormone replacement therapy.  Of note, last mammogram in fall 2020 demonstrated microcalcific    Dyslipidemia     Elevated fasting blood sugar 10/13/2021      Ref. Range 2/11/2021 10:18 Glucose Latest Ref Range: 65 - 99 mg/dL 102 (H)   Ref. Range 10/13/2021 07:45 Glycohemoglobin Latest Ref Range: 4.0 - 5.6 % 5.1 Estim. Avg Glu Latest Units: mg/dL 100  She has strong family history of type 2 diabetes.  She is very active and has a normal weight.  She had a small increase in her fasting blood sugar on last lab evaluation. Follow up A1c stable.     Ovarian cyst 10/2005    Right.    Renal cyst 9/215    Right - stable, possible stone    Renal cyst 10/2008    Left, simple 1.6cm. During ovarian US    Thrombocytopenia (HCC) 3/8/2022      Ref. Range 10/13/2021 10:13 Platelet Count Latest Ref Range: 164 - 446 K/uL 150 (L)  New incidental finding, no bruising or bleeding issues, not taking aspirin.      Past  "Surgical History:   Procedure Laterality Date    OTHER SURGICAL PROCEDURE Left 10/09/2006    Varicose veins procedure in office    MARKUS BY LAPAROSCOPY  07/31/2003    due to cholecystitis    OTHER ORTHOPEDIC SURGERY Right 01/26/2001    slipped on ice. Pins in wrist    HYSTERECTOMY LAPAROSCOPY  10/1995    partial (still has ovaries). Hx of fibroids/menorrhagia      Social History     Socioeconomic History    Marital status:      Spouse name: Not on file    Number of children: 1    Years of education: Not on file    Highest education level: Not on file   Occupational History    Not on file   Tobacco Use    Smoking status: Never    Smokeless tobacco: Never   Vaping Use    Vaping Use: Never used   Substance and Sexual Activity    Alcohol use: Yes     Alcohol/week: 0.0 - 0.6 oz     Comment: glass of wine 1-2x /wk    Drug use: No    Sexual activity: Yes     Partners: Male     Birth control/protection: Post-Menopausal   Other Topics Concern    Not on file   Social History Narrative    She is retired from [employer], retired in her early to mid 50s.  She is  to [place].  She has a son who lives in [facility].  She likes to be active.  She relocated to [place] from [place] around 2018.     Social Determinants of Health     Financial Resource Strain: Not on file   Food Insecurity: Not on file   Transportation Needs: Not on file   Physical Activity: Not on file   Stress: Not on file   Social Connections: Not on file   Intimate Partner Violence: Not on file   Housing Stability: Not on file         Objective     /78   Pulse 77   Temp 36.9 °C (98.5 °F) (Temporal)   Resp 16   Ht 1.676 m (5' 6\")   Wt 64.9 kg (143 lb)   SpO2 94%   BMI 23.08 kg/m²      Physical Exam  Vitals and nursing note reviewed.   Constitutional:       Appearance: Normal appearance. She is normal weight.   HENT:      Head: Normocephalic and atraumatic.      Right Ear: Tympanic membrane and external ear normal.      Left Ear: Tympanic membrane " and external ear normal. No drainage, swelling or tenderness.  No middle ear effusion. There is no impacted cerumen. Tympanic membrane is not erythematous or bulging.      Nose: Nose normal.      Mouth/Throat:      Mouth: Mucous membranes are moist.      Pharynx: Oropharynx is clear.   Eyes:      Extraocular Movements: Extraocular movements intact.      Pupils: Pupils are equal, round, and reactive to light.   Cardiovascular:      Rate and Rhythm: Normal rate and regular rhythm.   Pulmonary:      Effort: Pulmonary effort is normal.   Musculoskeletal:         General: Normal range of motion.      Cervical back: Normal range of motion.   Skin:     General: Skin is warm and dry.      Capillary Refill: Capillary refill takes less than 2 seconds.   Neurological:      General: No focal deficit present.      Mental Status: She is alert and oriented to person, place, and time. Mental status is at baseline.   Psychiatric:         Mood and Affect: Mood normal.         Speech: Speech normal.         Thought Content: Thought content normal.         Judgment: Judgment normal.                           Assessment & Plan        1. Eustachian tube dysfunction, left    Encouraged pt to use flonase nasal spray BID for one week along with a non drowsy antihistamine such as claritin daily  This should successfully drain fluid from ear and alleviate plugged ear sensation  RTC if not improving or follow up with PCP  Supportive care, differential diagnoses, and indications for immediate follow-up discussed with patient.    Pathogenesis of diagnosis discussed including typical length and natural progression.    Instructed to return to  or nearest emergency department if symptoms fail to improve, for any change in condition, further concerns, or new concerning symptoms.  Patient states understanding of the plan of care and discharge instructions.

## 2022-10-24 ENCOUNTER — APPOINTMENT (OUTPATIENT)
Dept: MEDICAL GROUP | Facility: PHYSICIAN GROUP | Age: 67
End: 2022-10-24
Payer: MEDICARE

## 2022-10-27 ENCOUNTER — OFFICE VISIT (OUTPATIENT)
Dept: BEHAVIORAL HEALTH | Facility: CLINIC | Age: 67
End: 2022-10-27
Payer: MEDICARE

## 2022-10-27 DIAGNOSIS — F33.2 MDD (MAJOR DEPRESSIVE DISORDER), RECURRENT SEVERE, WITHOUT PSYCHOSIS (HCC): ICD-10-CM

## 2022-10-27 PROCEDURE — 90791 PSYCH DIAGNOSTIC EVALUATION: CPT | Performed by: PSYCHOLOGIST

## 2022-10-27 ASSESSMENT — ANXIETY QUESTIONNAIRES
4. TROUBLE RELAXING: MORE THAN HALF THE DAYS
IF YOU CHECKED OFF ANY PROBLEMS ON THIS QUESTIONNAIRE, HOW DIFFICULT HAVE THESE PROBLEMS MADE IT FOR YOU TO DO YOUR WORK, TAKE CARE OF THINGS AT HOME, OR GET ALONG WITH OTHER PEOPLE: SOMEWHAT DIFFICULT
1. FEELING NERVOUS, ANXIOUS, OR ON EDGE: NEARLY EVERY DAY
3. WORRYING TOO MUCH ABOUT DIFFERENT THINGS: NEARLY EVERY DAY
5. BEING SO RESTLESS THAT IT IS HARD TO SIT STILL: SEVERAL DAYS
7. FEELING AFRAID AS IF SOMETHING AWFUL MIGHT HAPPEN: NOT AT ALL
6. BECOMING EASILY ANNOYED OR IRRITABLE: NEARLY EVERY DAY
2. NOT BEING ABLE TO STOP OR CONTROL WORRYING: NEARLY EVERY DAY
GAD7 TOTAL SCORE: 15

## 2022-10-27 ASSESSMENT — PATIENT HEALTH QUESTIONNAIRE - PHQ9
SUM OF ALL RESPONSES TO PHQ QUESTIONS 1-9: 22
5. POOR APPETITE OR OVEREATING: 2 - MORE THAN HALF THE DAYS
CLINICAL INTERPRETATION OF PHQ2 SCORE: 5

## 2022-10-27 NOTE — PROGRESS NOTES
"BEHAVIORAL HEALTH  INITIAL PSYCHOLOGICAL EVALUATION    Name: iLdia Dior   MRN: [id number]   : 1955   Age: 67 y.o.   Date of assessment: 10/27/2022   PCP: Lesa Garcia D.O.   Persons in attendance:Patient  Total face-to-face time: 70 minutes    Therapy was provided on this date in coordination with the State approved Clinical Supervisor under the direct supervision of Dr. Emile Willett who was on site during this visit.     Confidentiality and limits reviewed; patient endorsed understanding and desire to continue.    CHIEF COMPLAINT AND HISTORY OF PRESENTING PROBLEM:   (As stated by Patient)  Lidia  is a 67 y.o. female referred for assessment by Theirene Francisco M.D. Primary presenting issue includes suicidal ideation and depression related to family relational issues.    Due to current patient safety concerns, a majority of the session was spent completing a crisis safety plan with Lidia, so that she has a plan in place if she is feeling suicidal between individual therapy sessions. Due to time limitations, some background information was not gathered, but will be gathered in future sessions.     Lidia stated that she had a bad couple of days.     She stated that she is hurting and that she has a lot of dark and negative thoughts going on in her head. She stated that some of thoughts are that \"she is not a good person, and that she is crazy.\" She also expressed feeling that she is difficult and does not want to be a burden on other people.     She stated that she feels tired of feeling this way and that she will be anxious and irritable for \"little things.\"     She feels like she is a difficult person to deal with. She mentioned some issues with her sister, that have increased her depression. Some things from her past that are very painful have resurfaced. Very hurtful to think about.  She stated that she is oversensitive about a lot of things. Lidia stated that she sometimes has thoughts of " "hating her sister, and she is struggling with these thoughts because she does not want to feel that way. She stated that sometimes she feels like she wants to hurt her sister.     \"I just don't want to be in a dark mood all of the time.\" She stated tht she tried to sepraarte herself from her sister as much as possible, because it is a toxic relationship. Her main soure of distress is the relationship with her sister. Family members have told her to seek counseling because of observations of how she and her sister interact.     Lidia has previously had marital counseling with her  which she stated was a good experience. She stated that it was good for their relationship, and that he was very supportive of her. She appreciated, \"Just that I could talk to someone and listen.\"    She talked about a friend who is very supportive of her who has been to therapy herself and also spoke about a bible study group that she feels very comfortable with.     The therapist assessed Lidia for risk, considering her recent ER visit for suicidality, and Lidia reported that she is still currently having suicidal thoughts. The therapist explained to Lidia that they could work together to complete a crisis safety plan or explore potential hospitalization. Lidia stated that she would like to engage in weekly individual therapy, and also to complete a safety plan that she can refer to in between sessions. The therapist and Lidia spent the remainder of the time completing her crisis safety plan. The therapist made a copy for himself, and also provided Lidia with a copy of the plan. They also discussed Lidia setting up an appointment for psychiatry, and she agreed. Lidia will begin to see the therapist for individual therapy next week, and will set up a psychiatry appointment as well. Some of the safety plan steps that were discussed can be found below:     Crisis Safety Plan:     Triggers:  Thoughts: \"I'm a difficult person.\" " "\"Im not a nice person\" \"I'm not  worthy.\"   Feelings: Sometimes I feel like I want to hurt my sister. , I justhate my sister, feelings in a dark place   Behaviors: I cry, very sad  Symptoms: Tired, exhausted, overwhelmed with feelings    2. Internal coping strategies  Prayer  Read  Moravian music  Take a walk  Meditation  I don't think anything barbra get in the way. If my feelings are so dark,   I can carry the paper with me, and I can journal     3. People and Social Settings that Provide Distraction  Name: Friend Rosanna  Niece: Katherine   Place: For a walk, hiking ([email or web address])   Place: Being with friend sharing tea or coffee    4. People I can Ask for Help if Distraction Alone Doesn't Fully work  Name:  Gilberto  Name:    Name: alex perez or friend Rosanna    5. Professionals or Agencies that I can contact during a crisis  Therapist - Mario Pinto  PCP - Dr. Garcia  Suicide hotline or [address] [place] [address] Emergency Service     6. Making the environment safe  Lidia stated that she does not have access to firearms, medications, or toxins/poisons. She reported that she has not thought about harming herself with sharp objects or rope/strings. She reported that she has thought about driving off of a [address], and mentioned that her  has a tracker on her car because of this, and she mentioned that she would like to turn it on so that he knows where she is.     HISTORY OF PRESENT ILLNESS:      Patient was seen by primary care / psychiatry on 8/10/22 and the following excerpts are relevant to today's visit:    Chief Complaint   Patient presents with    Depression       Crying a lot for the past month. Family issue that has been bothering her.     Referral Needed       Psychiatrist or Therapist.             HPI:   Lidia presents today for evaluation of her depression..       She typically follows up with Dr. Garcia is currently out of the office.  She states that over the last month she has been feeling " "depressed and crying a lot.  States that she has been having some issues with her sister.  She did feel depressed approximately 8 years ago.  At that time she was able to manage the depression on her own without any medications or following up with specialist.  States that at that time she prayed.  Over the last month she has been feeling depressed and crying.  States that last week she was visiting her son and was distracted and felt better.  She continues to pray and does have thoughts suicidal ideations.  States that she does not have a plan but feels like she wants a way out and that her family would be better off without her.    She then went to the ER    Chief Complaint   Patient presents with    Depression       Ongoing x 1 mon  Saw PMD and referred to ED  \" Family  issues \"    Suicidal Ideation       X 2 wks         HPI  Lidia Dior is a 66 y.o. female who presents to the ED for suicidal ideation onset two weeks ago. She was previously seen by her PCP today who referred her to the ED. She reports feelings of depression stemming from \"toxic\" family issues with her sister. Her and her  moved her four years ago to be with family but she states it has \"not been working out\". She denies any physical self-harm, but wishes to \"get rid of the pain\". If she were to hurt herself, she states she would drive off a [address], and \"is not able to get there thoughts out of her head\" but has not taken any action on these thoughts. She denies any history of depression or previous suicidal ideations. She also denies any previous treatment with medications or therapy.     FAMILY/SOCIAL HISTORY:  Does patient/parent report a family history of behavioral health issues, diagnoses, or treatment? No  No family history on file.    Social History     Socioeconomic History    Marital status:     Number of children: 1   Tobacco Use    Smoking status: Never    Smokeless tobacco: Never   Vaping Use    Vaping Use: Never " used   Substance and Sexual Activity    Alcohol use: Yes     Alcohol/week: 0.0 - 0.6 oz     Comment: glass of wine 1-2x /wk    Drug use: No    Sexual activity: Yes     Partners: Male     Birth control/protection: Post-Menopausal   Social History Narrative    She is retired from [employer], retired in her early to mid 50s.  She is  to Gilberto.  She has a son who lives in [facility].  She likes to be active.  She relocated to [place] from [place] around 2018.      Social Determinants of Health     Tobacco Use: Low Risk     Smoking Tobacco Use: Never    Smokeless Tobacco Use: Never   Alcohol Use: Not on file   Financial Resource Strain: Not on file   Food Insecurity: Not on file   Transportation Needs: Not on file   Physical Activity: Not on file   Stress: Not on file   Social Connections: Not on file   Intimate Partner Violence: Not on file   Depression: At risk    PHQ-2 Score: 6   Housing Stability: Not on file        Relevant family or social history, structure, or dynamics: Lidia is currently living with her , and stated that she has a lot of family tension with one of her sisters specifically. She stated that their relationship is toxic and that her sister is very insensitive and cruel to her.      PSYCHIATRIC TREATMENT HISTORY:  Does patient/parent report a history of outpatient psychiatric or other behavioral health treatment for patient?     Yes, Lidia stated that her and her  have gone through marital counseling before, which was helpful.                 Does patient/parent report a history of psychiatric hospitalizations for patient?  No:    PAST MEDICAL/SURGICAL HISTORY:     Past Medical History:   Diagnosis Date    Abnormal mammogram 11/3/2021    Current long-term use of postmenopausal hormone replacement therapy 7/16/2018    She was on HRT from 5484-0974 in the form of estradiol patch and oral progesterone.  She went off of this in June 2021 but notes since that time she has had return of  menopausal symptoms including hot flashes as well as low energy, fatigue, poor sleep, etc.  She be interested in going back on some form of hormone replacement therapy.  Of note, last mammogram in fall 2020 demonstrated microcalcific    Dyslipidemia     Elevated fasting blood sugar 10/13/2021      Ref. Range 2/11/2021 10:18 Glucose Latest Ref Range: 65 - 99 mg/dL 102 (H)   Ref. Range 10/13/2021 07:45 Glycohemoglobin Latest Ref Range: 4.0 - 5.6 % 5.1 Estim. Avg Glu Latest Units: mg/dL 100  She has strong family history of type 2 diabetes.  She is very active and has a normal weight.  She had a small increase in her fasting blood sugar on last lab evaluation. Follow up A1c stable.     Ovarian cyst 10/2005    Right.    Renal cyst 9/215    Right - stable, possible stone    Renal cyst 10/2008    Left, simple 1.6cm. During ovarian US    Thrombocytopenia (HCC) 3/8/2022      Ref. Range 10/13/2021 10:13 Platelet Count Latest Ref Range: 164 - 446 K/uL 150 (L)  New incidental finding, no bruising or bleeding issues, not taking aspirin.        Medication Allergies  Allergies   Allergen Reactions    Aspirin Unspecified     Upset stomach     Latex Hives, Itching, Rash and Swelling        Medications (non psychiatric)  Current Outpatient Medications on File Prior to Visit   Medication Sig Dispense Refill    ARMOUR THYROID 90 MG Tab TAKE 1 TABLET BY MOUTH EVERY MORNING ON AN EMPTY STOMACH 100 Tablet 3    fluticasone (FLONASE) 50 MCG/ACT nasal spray Administer 1 Spray into affected nostril(S) every day. 16 g 2    Probiotic Product (PROBIOTIC PO)       Multiple Vitamins-Minerals (HAIR SKIN AND NAILS FORMULA PO)       Omega-3 Fatty Acids (FISH OIL PO) Take  by mouth.      Cholecalciferol (VITAMIN D-3) 5000 units Tab Take  by mouth.      Vitamin E 400 UNIT Tab Take  by mouth.      B Complex Vitamins (B COMPLEX PO) Take  by mouth.      CALCIUM-MAGNESIUM-ZINC PO Take  by mouth.       No current facility-administered medications on file  prior to visit.      No current facility-administered medications for this visit.       DEVELOPMENTAL HISTORY:  Delivery:Full term, normal vaginal delivery  Birth weight:   Prenatal complications:  no   complications:  no   complications:  no  In utero substance exposure:  no    Reached developmental milestones within normal limits?              Gross motor:  Yes  Fine motor:  Yes              Speech:  Yes              Social interaction:  Yes    EDUCATIONAL:  Is patient currently enrolled in a school/educational program?   No  Current learning needs (large print, language barrier, etc):  Lidia is dyslexic        LEGAL HISTORY  Has the patient ever been involved with juvenile, adult, or family legal systems? No    ABUSE/NEGLECT SCREENING:  Does patient report feeling “unsafe” in his/her home, or afraid of anyone?  no  Does patient report any history of physical, sexual, or emotional abuse?  no  Does parent or significant other report any of the above? no  Is there evidence of neglect by self?  no  Is there evidence of neglect by a caregiver? no  Does the patient/parent report any history of CPS/APS/police involvement related to suspected abuse/neglect or domestic violence? no    Based on the information provided during the current assessment, is a mandated report of suspected abuse/neglect being made?  No    SAFETY ASSESSMENT - SELF  Does patient acknowledge, parent/significant other report, or presenting problem suggest risk of dangerousness to self? Yes:     Past Current    Suicidal Thoughts: [x]  [x]    Suicidal Plans: []  []    Suicidal Intent: []  []    Suicide Attempts: []  []    Self-Injury []  []      For any boxes checked above, provide detail: Lidia stated that she has had suicidal thoughts before when she gets into a very dark place, and that they come up a lot when she has negative interactions with her sister.     History of suicide by family member: no  History of suicide by  friend/significant other: yes - Lidia stated that she had a good friend about 8 years ago who  by suicide via hanging.   Recent change in frequency/specificity/intensity of suicidal thoughts or self-harm behavior? no  Current access to firearms, medications, or other identified means of suicide/self-harm? yes - Lidia has a car, and has stated that she has thought about driving it off of a [address].   If yes, willing to restrict access to means of suicide/self-harm? yes - Lidia's , who she trusts, has a tracker on her car to monitor where she is when she is away from home, and so that he can check in with her.   Protective factors present:  Future-oriented, Positive coping skills, Spiritual beliefs/practices, Strong socia/community connections, and Willing to address in treatment    Crisis Safety Plan completed, documented in chart, and copy given to patient: Yes     SAFETY ASSESSMENT - OTHERS  Does patient acknowledge, parent/significant other report, or presenting problem suggest risk of dangerousness to others? No    Crisis Safety Plan completed, documented in chart, and copy given to patient: No    SUBSTANCE USE/ADDICTION HISTORY:    Is there a family history of substance use/addiction? yes - Unknown  Does patient acknowledge or parent/significant other report use of/dependence on substances? no  Last time patient used alcohol: Unsure  Within the past week? no  Last time patient used marijuana: Unsure  Within the past month? no        STRENGTHS/ASSETS:  Strengths Identified by interviewer: Self-awareness, Social support, Stable relationships, and Effeectively addressed past stressors/challenges  Strengths Identified by patient: Lidia has identified a number of strengths, including maintaining positive relationships with a trusted friend and her niece, in addition to her bisi. Lidia is very devoted to her bisi and goes to a weekly bible study where she feels supported.     MENTAL STATUS  EXAM/OBSERVATIONS  There were no vitals taken for this visit.  Participation:  Active verbal participation, Attentive, Engaged, and Open to feedback  Grooming:  Good and Casual  Orientation: Alert and Fully Oriented  Eye contact: Good  Behavior: Calm   Mood:  Depressed  Affect: Flexible, Congruent with content, Sad, and Tearful  Thought process: Logical and Goal-directed  Thought content: Within normal limits  Speech: Rate within normal limits and Soft  Perception: Within normal limits  Memory:  No gross evidence of memory deficits  Insight:  Adequate  Judgment:  Adequate    RESULTS OF SCREENING MEASURES:    Depression Screening    Little interest or pleasure in doing things?  2 - more than half the days   Feeling down, depressed , or hopeless? 3 - nearly every day   Trouble falling or staying asleep, or sleeping too much?  3 - nearly every day   Feeling tired or having little energy?  3 - nearly every day   Poor appetite or overeating?  2 - more than half the days   Feeling bad about yourself - or that you are a failure or have let yourself or your family down? 3 - nearly every day   Trouble concentrating on things, such as reading the newspaper or watching television? 2 - more than half the days   Moving or speaking so slowly that other people could have noticed.  Or the opposite - being so fidgety or restless that you have been moving around a lot more than usual?  1 - several days   Thoughts that you would be better off dead, or of hurting yourself?  3 - nearly every day   Patient Health Questionnaire Score: 22       If depressive symptoms identified deferred to follow up visit unless specifically addressed in assesment and plan.    Interpretation of PHQ-9 Total Score   Score Severity   1-4 No Depression   5-9 Mild Depression   10-14 Moderate Depression   15-19 Moderately Severe Depression   20-27 Severe Depression     MAGNUS-7 Questionnaire    Feeling nervous, anxious, or on edge: Nearly every day  Not being able  "to sop or control worrying: Nearly every day  Worrying too much about different things: Nearly every day  Trouble relaxing: More than half the days  Being so restless that it's hard to sit still: Several days  Becoming easily annoyed or irritable: Nearly every day  Feeling afraid as if something awful might happen: Not at all  Total: 15    Interpretation of MAGNUS 7 Total Score   Score Severity :  0-4 No Anxiety   5-9 Mild Anxiety  10-14 Moderate Anxiety  15-21 Severe Anxiety     CLINICAL FORMULATION: Lidia is a 67 y.o. woman who has struggled with depression for about five months. Her depressive symptoms and suicidal ideation are currently being exacerbated by a \"toxic\" relationship with her sister. Lidia states that negative interactions with her sister put her in a very dark place, and that she wants to come in to be able to talk to someone who will listen. Lidia has some social supports in her life, such as a , friend, and niece who care for her deeply and have supported her in the past. Lidia does not often reach out to others because she perceives that she is \"difficult to deal with,\" and \"overbearing.\" Lidia will benefit from weekly individual therapy to process her relational issues with her sister, as well as psychiatric interventions to alleviate some symptoms of depression that she is experiencing.       DIAGNOSTIC IMPRESSION(S):  MDD, severe, without psychosis    IDENTIFIED NEEDS/PLAN:  [If any of these marked, trigger DISPOSITION list]  Mood/anxiety  Actively being addressed by [facility]    Does patient express agreement with the above plan? Yes    Referral appointment(s) scheduled? Yes    More than 50% of face-to-face time was spent in counseling and coordinating care  Discussed: See above         Oliver Wheatley, Ph.D.  Clinical Psychologist  [place] [id number]                                        "

## 2022-11-03 ENCOUNTER — OFFICE VISIT (OUTPATIENT)
Dept: BEHAVIORAL HEALTH | Facility: CLINIC | Age: 67
End: 2022-11-03
Payer: MEDICARE

## 2022-11-03 DIAGNOSIS — F33.2 MDD (MAJOR DEPRESSIVE DISORDER), RECURRENT SEVERE, WITHOUT PSYCHOSIS (HCC): ICD-10-CM

## 2022-11-03 PROCEDURE — 90834 PSYTX W PT 45 MINUTES: CPT | Performed by: PSYCHIATRY & NEUROLOGY

## 2022-11-03 NOTE — PROGRESS NOTES
[facility]   Therapy Progress Note        Name: Lidia Dior  MRN: [id number]  : 1955  Age: 67 y.o.  Date of assessment: 11/3/2022  PCP: Lesa Garcia D.O.  Persons in attendance: Patient  Total session time: 50 minutes    Therapy was provided on this date in coordination with the [facility] approved Clinical Supervisor under the direct supervision of Dr. Emile Willett who was on site during this visit.       Topics addressed in psychotherapy include:     Lidia stated that she was feeling better since leaving session last week . Her son was in town visiting her, and she stated she was trying to hold in her emotions because she did not want to stress him out.     Her sisters both met up  with her for breakfast, and the sister she is having issues with (Gissel) just assumed that her son was going to be staying with her. She invited her sister over for dinner, and sister did not show up, and did not text until Lidia reached back out. Sister came back over when son and his wife were in town and offered for them to stay with her again, which Ldiia stated was very hurtful.    She stated that she is tired of being the communicator between her sister and the rest of the family. She has had a conversation with her sister (Gissel) about wanting to keep her distance, and Lidia stated that Gissel responded in a way that was very hurtful to her.     She stated that Gissel is the youngest out of 9 siblings, and she is the second to the youngest. She stated that Gissel was very spoiled and bratty, and that she wanted to get away all of the tie. Lidia stated that she was fairly quick, and would just do her own thing. She stated that they had amazing parents, that they were very loving and supportive. Lidia stated that she always made it a point to go home [place]. [place] after her mother passed to take care of father who passed away a few months later.     Lidia  "state that her family members were concerned about Gissel because she is the youngest and does not communicate. Gissel's  passed away from open hearts surgery about a year and a half ago. He left Gissel in a terrible financial situation. Lidia's  helped her out with setting up a budget and loaning her money. Lidia was helping out with the house.     After this experience, Lidia stated that she reverted to not communicating with him again, and made the comment that her sister takes advantage of others.      \"She does not show much empathy for people, but she wants everyone to cater to her.\" She stated that Gissel has done some pretty cruel things to her in the past.     She stated that she would like to find out why she is so bothered by what her sister says. She stated that Gissel and another sister, Maribell have called her a \"Troublemaker and overbearing.\"     The therapist provided validation and empathic listening, and also commended Lidia for her caring nature towards her family. He pointed out that it can be difficult dealing with others who are not as considerate as us, especially if they are family members. He encouraged Lidia to remember all of the different ways that she is kind and caring, and suggested that he and her spend some time processing the hurtful things that her sister has said to her. Lidia responded well to this, and mentioned that she would like to focus on herself and take care of herself before decided how she wants to move forward with the relationship with her sister. Lidia also stated that she is very encouraged by the bible verse [place] 5:22-23, \"The fruit of the spirit,\" which reminds her of how she would like to be to others.     Objective Observations:   Participation:Active verbal participation, Attentive, Engaged, and Open to feedback   Grooming:Good and Casual   Cognition:Alert and Fully Oriented   Eye Contact:Good   Mood:Euthymic, Depressed, and Anxious   Affect:Flexible, " Full range, and Congruent with content   Thought Process:Logical   Speech:Rate within normal limits and Volume within normal limits    Current Risk:   Suicide: low   Homicide: low   Self-Harm: low   Relapse: low   Safety Plan Reviewed: not applicable    Care Plan Updated: No    Does patient express agreement with the above plan? Yes     Diagnosis:  1. MDD (major depressive disorder), recurrent severe, without psychosis (HCC)        Referral appointment(s) scheduled? Yes       Mushtaq Pinto PsyD  Clinical Psychologist Assistant   [place] #

## 2022-11-10 ENCOUNTER — TELEMEDICINE (OUTPATIENT)
Dept: BEHAVIORAL HEALTH | Facility: CLINIC | Age: 67
End: 2022-11-10
Payer: MEDICARE

## 2022-11-10 DIAGNOSIS — F33.2 MDD (MAJOR DEPRESSIVE DISORDER), RECURRENT SEVERE, WITHOUT PSYCHOSIS (HCC): ICD-10-CM

## 2022-11-10 PROCEDURE — 90834 PSYTX W PT 45 MINUTES: CPT | Mod: 95 | Performed by: PSYCHIATRY & NEUROLOGY

## 2022-11-10 NOTE — PROGRESS NOTES
[facility]   Therapy Progress Note      This visit was conducted via Zoom using secure and encrypted videoconferencing technology.  The patient was in a private location in the state of [place].  The patient's identity was confirmed and verbal consent was obtained for this virtual visit.  Place of Service: POS 10 -The patient is at Home during their visit        Name: Lidia Dior  MRN: [id number]  : 1955  Age: 67 y.o.  Date of assessment: 11/10/2022  PCP: Lesa Garcia D.O.  Persons in attendance: Patient  Total session time: 50 minutes    Therapy was provided on this date in coordination with the [facility] approved Clinical Supervisor under the direct supervision of Dr. Emile Willett who was on site during this visit.     Objective Observations:   Participation:Active verbal participation, Attentive, Engaged, and Open to feedback   Grooming:Good and Casual   Cognition:Alert and Fully Oriented   Eye Contact:Good   Mood:Euthymic   Affect:Flexible, Congruent with content, and Sad   Thought Process:Logical and Goal-directed   Speech:Rate within normal limits and Volume within normal limits    Current Risk:   Suicide: low   Homicide: low   Self-Harm: low   Relapse: low   Safety Plan Reviewed: not applicable    Topics addressed in psychotherapy include:     Lidia stated that she has had a much better week this week. She stated that she has been thinking about her  and how he has been there watching her go through this. She talked about her sister in law coming to visit today, which she is looking forward to. This is a sister who was  to her brother who passed away a few years ago. She stated that she went to [employer] study last evening, which was very helpful for her.     She stated that she doesn't feel good that she and Gissel are seeing family separately. Thinking about going to see her sister and law with Gissel this week, but this gives her anxiety. The therapist validated this  "anxiety, and encouraged her to explore whether or not she felt ready to spend time with Gissel again yet. Lidia stated that she thinks she needs her own time to heal first.     Lidia stated that she thinks her sister has an envious streak where she will try to hurt me or embarrass me. \"I just have to learn how to let it go.\" She stated that she wonders why it is taking her so long to get over this hurt.    Lidia talked about her spirituality, and asked the therapist if he was a believer. They had a conversation about this, and Lidia explained that it was important for her because sometimes people don't understand her beliefs and convictions. \"It is hard for me to say this, but I have been frustrated towards the Lord, for not answering my prayers.\" The therapist validated this frustration, and helped her to explore this statement.     The therapist asked Lidia if she had any ideas about any lessons the  is trying to give her through this experience. She stated, \"I am not perfect, I need to be more understanding and patient with people. This is a time for me to grow, an be more loving and accepting.\"     Given her experiences with her sisters telling her how they viewed her, the therapist explored with Lidia, 'how do you see yourself?' She stated that she sees herself as a take charge person. \"I like to get things done.\" She stated that her personality and nature is that she always wants to help out. She gave some examples about when Gissel has asked her to help. She stated that Gissel may rely on others to do all of her hard work for her.     Lidia would like to work on continuing to reflect on herself this week, and being the best person that she can be.    Care Plan Updated: No    Does patient express agreement with the above plan? Yes     Diagnosis:  1. MDD (major depressive disorder), recurrent severe, without psychosis (HCC)        Referral appointment(s) scheduled? Yes       Mushtaq Pinto PsyD  Clinical " Psychologist Assistant   [place] #

## 2022-11-16 ENCOUNTER — TELEMEDICINE (OUTPATIENT)
Dept: BEHAVIORAL HEALTH | Facility: CLINIC | Age: 67
End: 2022-11-16
Payer: MEDICARE

## 2022-11-16 DIAGNOSIS — F33.2 MDD (MAJOR DEPRESSIVE DISORDER), RECURRENT SEVERE, WITHOUT PSYCHOSIS (HCC): ICD-10-CM

## 2022-11-16 PROCEDURE — 90834 PSYTX W PT 45 MINUTES: CPT | Mod: 95 | Performed by: PSYCHIATRY & NEUROLOGY

## 2022-11-16 NOTE — PROGRESS NOTES
[facility]   Therapy Progress Note      This visit was conducted via Zoom using secure and encrypted videoconferencing technology.  The patient was in a private location in the state of [place].  The patient's identity was confirmed and verbal consent was obtained for this virtual visit.  Place of Service: POS 10 -The patient is at Home during their visit       Name: Lidia Dior  MRN: [id number]  : 1955  Age: 67 y.o.  Date of assessment: 2022  PCP: Lesa Garcia D.O.  Persons in attendance: Patient  Total session time: 50 minutes    Therapy was provided on this date in coordination with the [facility] approved Clinical Supervisor under the direct supervision of Dr. Emile Willett who was on site during this visit.     Objective Observations:   Participation:Active verbal participation, Attentive, Engaged, and Open to feedback   Grooming:Good and Casual   Cognition:Alert and Fully Oriented   Eye Contact:Good   Mood:Euthymic   Affect:Flexible, Full range, and Congruent with content   Thought Process:Logical and Goal-directed   Speech:Rate within normal limits and Volume within normal limits    Current Risk:   Suicide: low   Homicide: low   Self-Harm: low   Relapse: low   Safety Plan Reviewed: not applicable    Topics addressed in psychotherapy include:     Lidia stated that she is doing well, and having a bit of anxiety this week because her sister in law was in town visiting. She stated that her sister was also there because other family was there, and that it was stressful, but that it went well. She stated that she just felt that it was the right thing to do to invite her. She just told herself that she is doing it for her sister in law. The therapist checked in with how Lidia was feeling about the decision, and commended her for listening to what she needed in the moment.     She stated that she just does not like her sister, and that she loves her, but just does not like her right  "now. She stated that she is very rude and loud, and that she interjects in conversations all the time also.     She also mentioned that her  has very supportive. While they were playing games, Gissel (Lidia's sister) made small rude comments to Lidia, and Lidia was able to just ignore. The therapist reflected that she is making a lot of progress in deciding how she wants to respond to situations in the moment, and encouraged her to express how those comments made her feel in session.     Lidia wanted some help to think of how to say things to her sister Gissel when she is interjecting herself into a conversation and situation. She and the therapist explored this, and she came up with: \"Let them have their time.\" \"I'm still talking, please let me finish.\"     She talked about feelings resentment for Gissel, however she stated that she will likely let go of some of these feelings as she spends more time away from Gissel.     Lidia also asked about any reading material the therapist had on healthy boundaries. The therapist sent her an article from [employer] that explained and gave examples of healthy boundaries. She and the therapist will come together to create some healthy boundaries for Lidia in her next session, but she stated that she would like to be treated kindly and respectfully in relationships with others.     Care Plan Updated: No    Does patient express agreement with the above plan? Yes     Diagnosis:  1. MDD (major depressive disorder), recurrent severe, without psychosis (HCC)        Referral appointment(s) scheduled? Yes       Mushtaq Pinto PsyD  Clinical Psychologist Assistant   [place] #   "

## 2022-11-22 ENCOUNTER — OFFICE VISIT (OUTPATIENT)
Dept: MEDICAL GROUP | Facility: PHYSICIAN GROUP | Age: 67
End: 2022-11-22
Payer: MEDICARE

## 2022-11-22 VITALS
WEIGHT: 148.2 LBS | RESPIRATION RATE: 12 BRPM | HEART RATE: 65 BPM | BODY MASS INDEX: 23.82 KG/M2 | DIASTOLIC BLOOD PRESSURE: 70 MMHG | TEMPERATURE: 97 F | SYSTOLIC BLOOD PRESSURE: 114 MMHG | HEIGHT: 66 IN | OXYGEN SATURATION: 98 %

## 2022-11-22 DIAGNOSIS — M72.2 PLANTAR FASCIITIS OF LEFT FOOT: ICD-10-CM

## 2022-11-22 PROCEDURE — 99213 OFFICE O/P EST LOW 20 MIN: CPT | Performed by: FAMILY MEDICINE

## 2022-11-22 ASSESSMENT — FIBROSIS 4 INDEX: FIB4 SCORE: 1.173333333333333333

## 2022-11-22 NOTE — PROGRESS NOTES
CC:   Chief Complaint   Patient presents with    Foot Pain     Left heal. Started about 3 months ago. Throbbing pain.          HPI:   Lidia presents today for further evaluation of foot pain..     Plantar fasciitis of left foot  New issue. Complaining of left heel pain. . Has had issues with it before but never lasted this long.  States that this has been going on for years but over the last 3 months she has had a constant left heel pain.  Feels like it is swollen and she finds that she has been limping.  She did try over-the-counter ibuprofen, 1 tablet with some improvement.  States that she is on her feet all day and her symptoms are worse in the evenings.  Denies any history of injury or trauma.Wears flip flops at  home. Current tennis shoes are a year old      Current Outpatient Medications Ordered in Epic   Medication Sig Dispense Refill    ARMOUR THYROID 90 MG Tab TAKE 1 TABLET BY MOUTH EVERY MORNING ON AN EMPTY STOMACH 100 Tablet 3    Probiotic Product (PROBIOTIC PO)       Multiple Vitamins-Minerals (HAIR SKIN AND NAILS FORMULA PO)       Omega-3 Fatty Acids (FISH OIL PO) Take  by mouth.      Cholecalciferol (VITAMIN D-3) 5000 units Tab Take  by mouth.      Vitamin E 400 UNIT Tab Take  by mouth.      B Complex Vitamins (B COMPLEX PO) Take  by mouth.      CALCIUM-MAGNESIUM-ZINC PO Take  by mouth.       No current Epic-ordered facility-administered medications on file.       Past Medical History:   Diagnosis Date    Abnormal mammogram 11/3/2021    Current long-term use of postmenopausal hormone replacement therapy 7/16/2018    She was on HRT from 9391-5876 in the form of estradiol patch and oral progesterone.  She went off of this in June 2021 but notes since that time she has had return of menopausal symptoms including hot flashes as well as low energy, fatigue, poor sleep, etc.  She be interested in going back on some form of hormone replacement therapy.  Of note, last mammogram in fall 2020 demonstrated  "microcalcific    Dyslipidemia     Elevated fasting blood sugar 10/13/2021      Ref. Range 2/11/2021 10:18 Glucose Latest Ref Range: 65 - 99 mg/dL 102 (H)   Ref. Range 10/13/2021 07:45 Glycohemoglobin Latest Ref Range: 4.0 - 5.6 % 5.1 Estim. Avg Glu Latest Units: mg/dL 100  She has strong family history of type 2 diabetes.  She is very active and has a normal weight.  She had a small increase in her fasting blood sugar on last lab evaluation. Follow up A1c stable.     Ovarian cyst 10/2005    Right.    Renal cyst 9/215    Right - stable, possible stone    Renal cyst 10/2008    Left, simple 1.6cm. During ovarian US    Thrombocytopenia (HCC) 3/8/2022      Ref. Range 10/13/2021 10:13 Platelet Count Latest Ref Range: 164 - 446 K/uL 150 (L)  New incidental finding, no bruising or bleeding issues, not taking aspirin.       Social History     Tobacco Use    Smoking status: Never    Smokeless tobacco: Never   Vaping Use    Vaping Use: Never used   Substance Use Topics    Alcohol use: Yes     Alcohol/week: 0.0 - 0.6 oz     Comment: glass of wine 1-2x /wk    Drug use: No       Allergies:  Aspirin and Latex    Objective:       Exam:  /70   Pulse 65   Temp 36.1 °C (97 °F) (Temporal)   Resp 12   Ht 1.676 m (5' 6\")   Wt 67.2 kg (148 lb 3.2 oz)   SpO2 98%   BMI 23.92 kg/m²   Body mass index is 23.92 kg/m².  Wt Readings from Last 4 Encounters:   11/22/22 67.2 kg (148 lb 3.2 oz)   08/21/22 64.9 kg (143 lb)   08/11/22 65.3 kg (144 lb)   08/10/22 67.2 kg (148 lb 2.4 oz)       Gen: Alert and oriented, No apparent distress. Appropriately groomed.  Neck: Neck is supple without lymphadenopathy.No thyromegaly.   Lungs: Normal effort, CTA bilaterally, no wheezes, rhonchi, or rales  CV: Regular rate and rhythm. No Lower extremity edema  Skin: No rash noted.  Left heel tenderness to palpation. Unable to walk on heels.      Assessment & Plan:     67 y.o. female with the following -     1. Plantar fasciitis of left foot  -new issue . " Not improving. D/w her that she can try higher dose of ibuprofen. 600mg every 6-8 hours prn.   Discussed with her that these need to be taken with food and water as they can upset the stomach and cause kidney issues.  Plantar fasciitis exercises printed out for her .  Discussed with her referral to podiatrist but for now patient would like   to be seen by physical therapy.    Referral to Physical Therapy      No follow-ups on file.    Please note that this dictation was created using voice recognition software. I have made every reasonable attempt to correct obvious errors, but I expect that there are errors of grammar and possibly content that I did not discover before finalizing the note.

## 2022-11-22 NOTE — ASSESSMENT & PLAN NOTE
New issue. Complaining of left heel pain. . Has had issues with it before but never lasted this long.  States that this has been going on for years but over the last 3 months she has had a constant left heel pain.  Feels like it is swollen and she finds that she has been limping.  She did try over-the-counter ibuprofen, 1 tablet with some improvement.  States that she is on her feet all day and her symptoms are worse in the evenings.  Denies any history of injury or trauma.Wears flip flops at  home. Current tennis shoes are a year old

## 2022-12-22 PROBLEM — F33.9 RECURRENT MAJOR DEPRESSIVE DISORDER (HCC): Status: ACTIVE | Noted: 2022-08-10

## 2022-12-27 ENCOUNTER — APPOINTMENT (OUTPATIENT)
Dept: BEHAVIORAL HEALTH | Facility: CLINIC | Age: 67
End: 2022-12-27
Payer: MEDICARE

## 2023-01-09 ENCOUNTER — OFFICE VISIT (OUTPATIENT)
Dept: MEDICAL GROUP | Facility: PHYSICIAN GROUP | Age: 68
End: 2023-01-09
Payer: MEDICARE

## 2023-01-09 VITALS
HEIGHT: 65 IN | TEMPERATURE: 97.1 F | WEIGHT: 148.7 LBS | OXYGEN SATURATION: 97 % | BODY MASS INDEX: 24.78 KG/M2 | RESPIRATION RATE: 12 BRPM | DIASTOLIC BLOOD PRESSURE: 60 MMHG | SYSTOLIC BLOOD PRESSURE: 112 MMHG | HEART RATE: 78 BPM

## 2023-01-09 DIAGNOSIS — E53.8 B12 DEFICIENCY: ICD-10-CM

## 2023-01-09 DIAGNOSIS — E55.9 VITAMIN D DEFICIENCY: ICD-10-CM

## 2023-01-09 DIAGNOSIS — E04.1 THYROID NODULE: ICD-10-CM

## 2023-01-09 DIAGNOSIS — E78.5 DYSLIPIDEMIA: ICD-10-CM

## 2023-01-09 DIAGNOSIS — F33.41 RECURRENT MAJOR DEPRESSIVE DISORDER, IN PARTIAL REMISSION (HCC): ICD-10-CM

## 2023-01-09 DIAGNOSIS — M79.672 PAIN OF LEFT HEEL: ICD-10-CM

## 2023-01-09 DIAGNOSIS — Z12.31 ENCOUNTER FOR SCREENING MAMMOGRAM FOR BREAST CANCER: ICD-10-CM

## 2023-01-09 DIAGNOSIS — R74.8 ELEVATED ALKALINE PHOSPHATASE LEVEL: ICD-10-CM

## 2023-01-09 DIAGNOSIS — E03.9 HYPOTHYROIDISM, UNSPECIFIED TYPE: ICD-10-CM

## 2023-01-09 PROCEDURE — 99214 OFFICE O/P EST MOD 30 MIN: CPT | Performed by: INTERNAL MEDICINE

## 2023-01-09 ASSESSMENT — FIBROSIS 4 INDEX: FIB4 SCORE: 1.173333333333333333

## 2023-01-09 ASSESSMENT — PATIENT HEALTH QUESTIONNAIRE - PHQ9: CLINICAL INTERPRETATION OF PHQ2 SCORE: 0

## 2023-01-09 NOTE — PROGRESS NOTES
Subjective:   Chief Complaint/History of Present Illness:  Lidia Dior is a 67 y.o. female established patient who presents today to discuss medical problems as listed below. Lidia is unaccompanied for today's visit.    Problem   Pain of Left Heel    She has had intermittent pain in the left heel dating back to August 2022.  No preceding injury.  Seen by a colleague of mine in November 2022 and felt to have plantar fasciitis, referred to physical therapy which she starts this week.  She has been doing stretching there are exercises that she found on YouTube.  Pain is worse after she is more active with walking for example and seems to alternate every other day with good days and bad days.  Has not yet met with a podiatrist.  She has been taking ibuprofen with increased doses as needed which helps take the edge off.     Recurrent Major Depressive Disorder, in Partial Remission (Hcc)    Depression Screening (1/2023)    Little interest or pleasure in doing things?  0 - not at all  Feeling down, depressed , or hopeless? 0 - not at all  Patient Health Questionnaire Score: 0    She had decompensated depression in August 2022 and saw my colleague while I was out on maternity leave.  Due to SI she was referred to the ER and they helped facilitate an appointment with a therapist and she will meet with a psychiatrist tomorrow. Overall mood is doing much better.       Elevated Alkaline Phosphatase Level     Latest Reference Range & Units 06/30/22 12:06   Alkaline Phosphatase 30 - 99 U/L 104 (H)     She has mild elevation of alkaline phosphatase level.  Previously running in the 80s.  No known liver disease.  She has slight arthritic changes in her hands.     Thyroid Nodule    Thyroid US (6/2022):      1.  Overall stable probably benign thyroid nodules.     ACR TI-RADS Recommendations  TR4 - follow up ultrasound in 1,2,3 and 5 yearss    Neck thyroid ultrasound due June 2023.        Dyslipidemia     Latest Reference  Range & Units 06/30/22 12:06   Cholesterol,Tot 100 - 199 mg/dL 191   Triglycerides 0 - 149 mg/dL 142   HDL >=40 mg/dL 50   LDL <100 mg/dL 113 (H)     The 10-year ASCVD risk score (Soumya HALE, et al., 2019) is: 4.9%    She was found to have a history of elevated cholesterol.  She is very active.  She has been able to bring this down with her healthy lifestyle.  She is curious whether she has any atherosclerosis and would like to obtain a coronary calcium score.     Hypothyroidism     Latest Reference Range & Units 06/30/22 12:06   TSH 0.380 - 5.330 uIU/mL 0.240 (L)   Free T-4 0.93 - 1.70 ng/dL 1.27   T3,Free 2.00 - 4.40 pg/mL 5.54 (H)       She reports diagnosis of hypothyroidism in 2008, she has been maintained on [place] Thyroid 90 mg daily with good results. She was also found to have a thyroid nodule on US in June 2021, stable on follow up in June 2022.    Current regimen: [place] Thyroid 90 mg daily     Vitamin D Deficiency       Latest Reference Range & Units 06/30/22 12:06   25-Hydroxy   Vitamin D 25 30 - 100 ng/mL 54     Previous history of vitamin D deficiency,  Normalized now on vitamin D 5000 IU daily.       B12 Deficiency     Latest Reference Range & Units 06/30/22 12:06   Vitamin B12 -True Cobalamin 211 - 911 pg/mL 685       She has history of B12 deficiency, currently on oral B12.  Previously received B12 injections dating back to 2016.          Current Medications:  Current Outpatient Medications Ordered in Epic   Medication Sig Dispense Refill    ARMOUR THYROID 90 MG Tab TAKE 1 TABLET BY MOUTH EVERY MORNING ON AN EMPTY STOMACH 100 Tablet 3    Probiotic Product (PROBIOTIC PO)       Multiple Vitamins-Minerals (HAIR SKIN AND NAILS FORMULA PO)       Cholecalciferol (VITAMIN D-3) 5000 units Tab Take  by mouth.      Vitamin E 400 UNIT Tab Take  by mouth.      B Complex Vitamins (B COMPLEX PO) Take  by mouth.      CALCIUM-MAGNESIUM-ZINC PO Take  by mouth.       No current Epic-ordered facility-administered  "medications on file.          Objective:   Physical Exam:    Vitals: /60 (BP Location: Left arm, Patient Position: Sitting, BP Cuff Size: Adult)   Pulse 78   Temp 36.2 °C (97.1 °F) (Temporal)   Resp 12   Ht 1.651 m (5' 5\")   Wt 67.4 kg (148 lb 11.2 oz)   SpO2 97%    BMI: Body mass index is 24.74 kg/m².  Physical Exam  Constitutional:       General: She is not in acute distress.     Appearance: Normal appearance. She is not ill-appearing.   HENT:      Right Ear: There is impacted cerumen.      Left Ear: Ear canal normal. There is no impacted cerumen.   Eyes:      General: No scleral icterus.     Conjunctiva/sclera: Conjunctivae normal.   Cardiovascular:      Rate and Rhythm: Normal rate and regular rhythm.      Pulses: Normal pulses.   Pulmonary:      Effort: Pulmonary effort is normal. No respiratory distress.      Breath sounds: No wheezing or rhonchi.   Abdominal:      General: Bowel sounds are normal. There is no distension.      Palpations: Abdomen is soft.   Musculoskeletal:      Right lower leg: No edema.      Left lower leg: No edema.   Skin:     General: Skin is warm and dry.      Findings: No bruising or rash.   Neurological:      Gait: Gait abnormal.   Psychiatric:         Mood and Affect: Mood normal.         Behavior: Behavior normal.         Thought Content: Thought content normal.         Judgment: Judgment normal.             Assessment and Plan:   Lidia is a 67 y.o. female with the following:  Problem List Items Addressed This Visit       Hypothyroidism     Chronic and ongoing problem, free T4 levels are normal, TSH slightly low, she prefers [place] [facility] and will continue on [place] Thyroid 90 mg daily.  Update lab levels for next visit.  No signs or symptoms of overtreatment or under treatment at this time.         Relevant Orders    CBC WITH DIFFERENTIAL    Comp Metabolic Panel    TSH    FREE THYROXINE    T3 FREE    Vitamin D deficiency     Chronic and stable problem.  Levels are " appropriate on current supplementation, continue vitamin D 5000 IU daily.         Relevant Orders    VITAMIN D,25 HYDROXY (DEFICIENCY)    B12 deficiency     Chronic and stable problem, vitamin B12 levels are appropriate on current supplementation, continue oral vitamin B12 and update levels to ensure stability.         Relevant Orders    VITAMIN B12    Dyslipidemia     Chronic and stable problem, low 10-year ASCVD risk score, update lipids and could consider CT cardiac score for additional risk stratification moving forward.         Relevant Orders    Lipid Profile    Thyroid nodule     Chronic and ongoing problem.  Due for 2-year follow-up thyroid ultrasound in June 2023, order given her today.         Relevant Orders    US-THYROID    Elevated alkaline phosphatase level     Previous problem, requires follow-up, most likely secondary to osteoarthritis but will recheck isoenzymes to ensure no liver concerns.         Relevant Orders    ALKALINE PHOSPHATASE ISOENZYMES    Recurrent major depressive disorder, in partial remission (HCC)     Newly decompensated problem in August 2022, she was referred to the ER and was able to establish with a therapist who she has been seeing since November.  She plans to get back in with them as she was out of town for a few weeks over the holidays and has not seen them since.  She is also going to establish with Dr. Stock of psychiatry tomorrow but overall is feeling much better from a mood perspective.         Pain of left heel     New and decompensated problem, present since at least August with marginal improvement with home stretching program.  She will start PT this week.  She would like to also meet with podiatry to confirm diagnosis and see if orthotics or injections would be indicated as the pain occurs on a daily or every other day basis.  We will update kidney function to ensure stability as she has been taking increased doses of ibuprofen.         Relevant Orders    Referral  to Podiatry     Other Visit Diagnoses       Encounter for screening mammogram for breast cancer        Relevant Orders    MA-SCREENING MAMMO BILAT W/TOMOSYNTHESIS W/CAD              RTC: Return in about 6 months (around 7/9/2023).    I spent a total of 38 minutes with record review, exam, communication with the patient, communication with other providers, and documentation of this encounter.    PLEASE NOTE: This dictation was created using voice recognition software. I have made every reasonable attempt to correct obvious errors, but I expect that there are errors of grammar and possibly content that I did not discover before finalizing the note.      Lesa Garcia, DO  Geriatric and Internal Medicine  [facility]

## 2023-01-09 NOTE — ASSESSMENT & PLAN NOTE
Chronic and ongoing problem, free T4 levels are normal, TSH slightly low, she prefers [place] Thyroid and will continue on [place] Thyroid 90 mg daily.  Update lab levels for next visit.  No signs or symptoms of overtreatment or under treatment at this time.  
Chronic and ongoing problem.  Due for 2-year follow-up thyroid ultrasound in June 2023, order given her today.  
Chronic and stable problem, low 10-year ASCVD risk score, update lipids and could consider CT cardiac score for additional risk stratification moving forward.  
Chronic and stable problem, vitamin B12 levels are appropriate on current supplementation, continue oral vitamin B12 and update levels to ensure stability.  
Chronic and stable problem.  Levels are appropriate on current supplementation, continue vitamin D 5000 IU daily.  
New and decompensated problem, present since at least August with marginal improvement with home stretching program.  She will start PT this week.  She would like to also meet with podiatry to confirm diagnosis and see if orthotics or injections would be indicated as the pain occurs on a daily or every other day basis.  We will update kidney function to ensure stability as she has been taking increased doses of ibuprofen.  
Newly decompensated problem in August 2022, she was referred to the ER and was able to establish with a therapist who she has been seeing since November.  She plans to get back in with them as she was out of town for a few weeks over the holidays and has not seen them since.  She is also going to establish with Dr. Stock of psychiatry tomorrow but overall is feeling much better from a mood perspective.  
Previous problem, requires follow-up, most likely secondary to osteoarthritis but will recheck isoenzymes to ensure no liver concerns.  
Quantity not sufficient

## 2023-01-09 NOTE — PATIENT INSTRUCTIONS
Anytime after March 8th you can go to [facility] for your next pneumonia shot, Prevnar 20

## 2023-01-10 ENCOUNTER — TELEMEDICINE (OUTPATIENT)
Dept: BEHAVIORAL HEALTH | Facility: CLINIC | Age: 68
End: 2023-01-10
Payer: MEDICARE

## 2023-01-10 DIAGNOSIS — G47.09 OTHER INSOMNIA: ICD-10-CM

## 2023-01-10 DIAGNOSIS — F33.1 MODERATE EPISODE OF RECURRENT MAJOR DEPRESSIVE DISORDER (HCC): ICD-10-CM

## 2023-01-10 PROBLEM — F33.41 RECURRENT MAJOR DEPRESSIVE DISORDER, IN PARTIAL REMISSION (HCC): Status: RESOLVED | Noted: 2022-08-10 | Resolved: 2023-01-10

## 2023-01-10 PROCEDURE — 96127 BRIEF EMOTIONAL/BEHAV ASSMT: CPT | Mod: 95 | Performed by: PSYCHIATRY & NEUROLOGY

## 2023-01-10 PROCEDURE — 99204 OFFICE O/P NEW MOD 45 MIN: CPT | Mod: 95 | Performed by: PSYCHIATRY & NEUROLOGY

## 2023-01-10 RX ORDER — DOXEPIN 6 MG/1
TABLET, FILM COATED ORAL
Qty: 30 TABLET | Refills: 1 | Status: SHIPPED | OUTPATIENT
Start: 2023-01-10 | End: 2023-05-02

## 2023-01-10 ASSESSMENT — ANXIETY QUESTIONNAIRES
IF YOU CHECKED OFF ANY PROBLEMS ON THIS QUESTIONNAIRE, HOW DIFFICULT HAVE THESE PROBLEMS MADE IT FOR YOU TO DO YOUR WORK, TAKE CARE OF THINGS AT HOME, OR GET ALONG WITH OTHER PEOPLE: SOMEWHAT DIFFICULT
7. FEELING AFRAID AS IF SOMETHING AWFUL MIGHT HAPPEN: NOT AT ALL
GAD7 TOTAL SCORE: 5
5. BEING SO RESTLESS THAT IT IS HARD TO SIT STILL: SEVERAL DAYS
6. BECOMING EASILY ANNOYED OR IRRITABLE: MORE THAN HALF THE DAYS
3. WORRYING TOO MUCH ABOUT DIFFERENT THINGS: NOT AT ALL
1. FEELING NERVOUS, ANXIOUS, OR ON EDGE: SEVERAL DAYS
2. NOT BEING ABLE TO STOP OR CONTROL WORRYING: SEVERAL DAYS
4. TROUBLE RELAXING: NOT AT ALL

## 2023-01-10 ASSESSMENT — PATIENT HEALTH QUESTIONNAIRE - PHQ9
SUM OF ALL RESPONSES TO PHQ QUESTIONS 1-9: 15
CLINICAL INTERPRETATION OF PHQ2 SCORE: 1
5. POOR APPETITE OR OVEREATING: 3 - NEARLY EVERY DAY
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
## 2023-01-11 ENCOUNTER — PHYSICAL THERAPY (OUTPATIENT)
Dept: PHYSICAL THERAPY | Facility: MEDICAL CENTER | Age: 68
End: 2023-01-11
Attending: FAMILY MEDICINE
Payer: MEDICARE

## 2023-01-11 DIAGNOSIS — M72.2 PLANTAR FASCIITIS OF LEFT FOOT: ICD-10-CM

## 2023-01-11 PROCEDURE — 97161 PT EVAL LOW COMPLEX 20 MIN: CPT

## 2023-01-11 ASSESSMENT — ENCOUNTER SYMPTOMS
PAIN SCALE AT LOWEST: 5
PAIN LOCATION: L FOOT
PAIN SCALE AT HIGHEST: 9
PAIN SCALE: 5

## 2023-01-11 NOTE — OP THERAPY EVALUATION
Outpatient Physical Therapy  INITIAL EVALUATION    [facility] Outpatient Physical Therapy  [address]  [place] [address]  Phone:  [phone]  Fax:  [phone]    Date of Evaluation: 01/11/2023    Patient: Lidia Dior  YOB: 1955  MRN: [id number]     Referring Provider: SADA Stone   [address]  [place] [address]   Referring Diagnosis Plantar fasciitis of left foot [M72.2]     Time Calculation  Start time: 1534  Stop time: 1615 Time Calculation (min): 41 minutes     Chief Complaint: Foot Problem    Visit Diagnoses     ICD-10-CM   1. Plantar fasciitis of left foot  M72.2       Date of onset of impairment: No data found    Subjective:   History of Present Illness:     Mechanism of injury:  The patient presents to physical therapy initial evaluation today reporting 6 month history of progressively worsening pain in her left heel that sometimes causes her to limp. Sometimes the foot feels inflamed, hot and hard. States that the pain started about 6 months ago in the summer, when she had been doing a lot of hiking but she does not remember any specific incident that the pain began. Was getting some soreness intermittently initially but now the pain is constant. Feels a pressure even when sitting and going to get up. Getting up to go to the bathroom in the middle of the night is difficult, pain is worse after sleeping for a while. Patient has a referral to a podiatrist but is supposed to be getting a call back to schedule soon. Patient does not get burning or numbness or tingling. Patient has tried some stretches that she tried online they are not making her worse. No prior history of injury to that foot. Patient has not had x-rays or MRIs. Patient wears flip flops inside her home, but has more recently been wearing sneakers inside her house. Looking for shoe recommendations. Does report that her arch collapses. Barefoot is more  "tender.     Per pt's PMI, \"She has had intermittent pain in the left heel dating back to 2022.  No preceding injury.  Seen by a colleague of mine in 2022 and felt to have plantar fasciitis, referred to physical therapy which she starts this week.  She has been doing stretching there are exercises that she found on YouTube.  Pain is worse after she is more active with walking for example and seems to alternate every other day with good days and bad days.  Has not yet met with a podiatrist.  She has been taking ibuprofen with increased doses as needed which helps take the edge off.\"  Prior level of function:  Pt previously independent and active with functional tasks and IADLs  Sleep disturbance:  Non-restful sleep (Can wake from sleep)  Pain:     Current pain ratin    At best pain ratin    At worst pain ratin    Location:  L foot    Quality:  Throbbing, aching and discomfort    Pain timing:  Constant and when active    Relieving factors:  Rest and position change (Exercise)    Aggravating factors:  Activity, walking and standing    Progression:  Unchanged  Social Support:     Lives in:  One-story house    Lives with:  Spouse  Patient Goals:     Patient goals for therapy:  Decreased pain    Other patient goals:  Return to active PLOF    Past Medical History:   Diagnosis Date    Abnormal mammogram 11/3/2021    Current long-term use of postmenopausal hormone replacement therapy 2018    She was on HRT from 6694-1237 in the form of estradiol patch and oral progesterone.  She went off of this in 2021 but notes since that time she has had return of menopausal symptoms including hot flashes as well as low energy, fatigue, poor sleep, etc.  She be interested in going back on some form of hormone replacement therapy.  Of note, last mammogram in 2020 demonstrated microcalcific    Dyslipidemia     Elevated fasting blood sugar 10/13/2021      Ref. Range 2021 10:18 Glucose Latest " Ref Range: 65 - 99 mg/dL 102 (H)   Ref. Range 10/13/2021 07:45 Glycohemoglobin Latest Ref Range: 4.0 - 5.6 % 5.1 Estim. Avg Glu Latest Units: mg/dL 100  She has strong family history of type 2 diabetes.  She is very active and has a normal weight.  She had a small increase in her fasting blood sugar on last lab evaluation. Follow up A1c stable.     Ovarian cyst 10/2005    Right.    Renal cyst 9/215    Right - stable, possible stone    Renal cyst 10/2008    Left, simple 1.6cm. During ovarian US    Thrombocytopenia (HCC) 3/8/2022      Ref. Range 10/13/2021 10:13 Platelet Count Latest Ref Range: 164 - 446 K/uL 150 (L)  New incidental finding, no bruising or bleeding issues, not taking aspirin.     Past Surgical History:   Procedure Laterality Date    OTHER SURGICAL PROCEDURE Left 10/09/2006    Varicose veins procedure in office    MARKUS BY LAPAROSCOPY  07/31/2003    due to cholecystitis    OTHER ORTHOPEDIC SURGERY Right 01/26/2001    slipped on ice. Pins in wrist    HYSTERECTOMY LAPAROSCOPY  10/1995    partial (still has ovaries). Hx of fibroids/menorrhagia     Social History     Tobacco Use    Smoking status: Never    Smokeless tobacco: Never   Substance Use Topics    Alcohol use: Yes     Alcohol/week: 0.0 - 0.6 oz     Comment: glass of wine 1-2x /wk     Family and Occupational History     Socioeconomic History    Marital status:      Spouse name: Not on file    Number of children: 1    Years of education: Not on file    Highest education level: Not on file   Occupational History    Not on file       Objective     Neurological Testing     Sensation     Ankle/Foot   Left Ankle/Foot   Intact: light touch    Right Ankle/Foot   Intact: light touch     Additional Neurological Details  Mild hyposensitivty in L medial foot     Palpation   Left   Tenderness of the posterior tibialis.     Tenderness   Left Ankle/Foot   Tenderness in the fifth metatarsal base, plantar fascia and posterior tibial tendon.     Active Range  of Motion   Left Ankle/Foot   Dorsiflexion (kf): 10 degrees   Plantar flexion: 50 degrees   Inversion: 24 degrees   Eversion: 12 degrees   Great toe extension: 60 degrees     Right Ankle/Foot   Normal active range of motion    Joint Play   Left Ankle/Foot     Distal tib-fib joint: within functional limits    Talocrural joint: within functional limits    Subtalar joint: within functional limits    Midfoot: hypermobile    Forefoot: within functional limits    Strength:      Left Ankle/Foot   Dorsiflexion: 5  Left ankle plantar flexion strength: Will complete heel raise test next session.  Inversion: 4  Eversion: 4+  Great toe extension: 5    Right Ankle/Foot   Normal strength    Tests   Left Ankle/Foot   Positive for navicular drop.   Negative for eversion talar tilt, impingement sign, metatarsal squeeze, syndesmosis squeeze, Tinel's sign (tarsal tunnel) and windlass.     Additional Tests Details  R heel to wall 7 cm , L 6 cm    Swelling   Left Ankle/Foot   Figure 8: 56 cm  [place]oli: 23 cm    Right Ankle/Foot   Figure 8: 56 cm  [place]: 23.5 cm  Ambulation     Observational Gait   Decreased walking speed and left stance time.     Quality of Movement During Gait     Ankle    Ankle (Left): Positive medial heel whip and pronated.       Therapeutic Exercises (CPT 41803):       Therapeutic Exercise Summary: Patient educated to continue with current exercise regimen but to avoid aggravating activities such as treadmill walking at this time      Time-based treatments/modalities:       Assessment, Response and Plan:   Impairments: abnormal gait, activity intolerance, lacks appropriate home exercise program and pain with function    Assessment details:  Lidia Dior is a 67 y.o. female who presents to skilled physical therapist initial evaluation with current complaints of 6 month history of insidious onset L heel pain. She presents with objective impairments in gait mechanics, foot mobility, foot strength and stability,  walking and standing tolerance, and ability to participate in higher level functional and recreational activities. The impairments found during today's evaluation can be addressed by PT intervention and the patient is motivated to participate in PT intervention. It is anticipated that the pt will benefit from skilled physical therapy services to address functional limitations and impairments detailed above and to maximize her return to active PLOF.   Barriers to therapy:  None  Prognosis: good    Goals:   Short Term Goals:   1. The patient will be able to walk on even surfaces for 1/2 mile without an increase in heel pain  2. The patient will report 50% improvement in heel pain upon first steps in the morning  3. The patient will be able to perform 20 reps of single leg heel raise on the L without an increase in symptoms  4. The patient will follow up on referral to podiatrist  Short term goal time span:  2-4 weeks      Long Term Goals:    1. The patient will be able to walk for 1 mile on even and uneven surfaces without an increase in heel pain  2. The patient will report pain upon first waking in the morning to <2/10  3. The patient will be able to stand for 20 minutes without an increase in symptoms  4. The patient will be independent with HEP  Long term goal time span:  6-8 weeks    Plan:   Therapy options:  Physical therapy treatment to continue  Planned therapy interventions:  Therapeutic Exercise (CPT 45594), Therapeutic Activities (CPT 24450), Gait Training (CPT 92155), Neuromuscular Re-education (CPT 48168) and Manual Therapy (CPT 11053)  Frequency:  2x week  Duration in weeks:  5  Duration in visits:  10  Discussed with:  Patient  Plan details:  Pt educated on their current objective clinical presentation, anticipated PT POC, and importance of adherence to prescribed HEP in order to maximize PT benefit.         Functional Assessment Used        Referring provider co-signature:  I have reviewed this plan of  care and my co-signature certifies the need for services.    Certification Period: 01/11/2023 to  03/02/23    Physician Signature: ________________________________ Date: ______________

## 2023-01-12 DIAGNOSIS — G47.09 OTHER INSOMNIA: ICD-10-CM

## 2023-01-12 RX ORDER — DOXEPIN HYDROCHLORIDE 10 MG/1
10 CAPSULE ORAL NIGHTLY PRN
Qty: 30 CAPSULE | Refills: 1 | Status: SHIPPED | OUTPATIENT
Start: 2023-01-12 | End: 2023-02-07

## 2023-01-12 ASSESSMENT — ENCOUNTER SYMPTOMS
QUALITY: DISCOMFORT
PAIN TIMING: WHEN ACTIVE
QUALITY: ACHING
QUALITY: THROBBING
EXACERBATED BY: STANDING
ALLEVIATING FACTORS: REST
EXACERBATED BY: WALKING
EXACERBATED BY: ACTIVITY
ALLEVIATING FACTORS: POSITION CHANGE
PAIN TIMING: CONSTANT

## 2023-01-16 ENCOUNTER — PHYSICAL THERAPY (OUTPATIENT)
Dept: PHYSICAL THERAPY | Facility: MEDICAL CENTER | Age: 68
End: 2023-01-16
Attending: FAMILY MEDICINE
Payer: MEDICARE

## 2023-01-16 DIAGNOSIS — M72.2 PLANTAR FASCIITIS OF LEFT FOOT: ICD-10-CM

## 2023-01-16 PROCEDURE — 97110 THERAPEUTIC EXERCISES: CPT

## 2023-01-16 PROCEDURE — 97140 MANUAL THERAPY 1/> REGIONS: CPT

## 2023-01-16 NOTE — OP THERAPY DAILY TREATMENT
Outpatient Physical Therapy  DAILY TREATMENT     [facility] Outpatient Physical Therapy  [address]  [place] [address]  Phone:  [phone]  Fax:  [phone]    Date: 01/16/2023    Patient: Lidia Dior  YOB: 1955  MRN: [id number]     Time Calculation    Start time: 1533  Stop time: 1613 Time Calculation (min): 40 minutes       Chief Complaint: Foot Problem    Visit #: 2    SUBJECTIVE:  The patient states that she was able to do the elliptical at the gym over the weekend without significant onset of pain. She is agreeable to PT today.     OBJECTIVE:          Therapeutic Exercises (CPT 06602):     1. Recumbent bike, 6 minutes, L!    2. 4 way ankle t-band, 20x each, pink t-band    3. Foot short, 2x10 with toe extension stretch in between    4. Ankle board, 20x each PF/DF and inv/ev    5. Double leg heel raises, 20x    20. POC 01/11/2023 to  03/02/23      Therapeutic Exercise Summary: Patient educated to continue with current exercise regimen but to avoid aggravating activities such as treadmill walking at this time    Therapeutic Treatments and Modalities:     1. Manual Therapy (CPT 28687), Prone IASTM to L tibialis posterior, plantar fascia, gastroc/soleus    Time-based treatments/modalities:    Physical Therapy Timed Treatment Charges  Manual therapy minutes (CPT 70961): 8 minutes  Therapeutic exercise minutes (CPT 03034): 30 minutes    ASSESSMENT:   Response to treatment: The patient tolerated initiation of therex for foot and ankle strengthening and stability without onset of pain. She tolerated manual therapy techniques well, with tenderness at posterior tibialis muscle. The patient will continue to benefit from skilled physical therapy services to maximize return to active PLOF.     PLAN/RECOMMENDATIONS:   Plan for treatment: therapy treatment to continue next visit.  Planned interventions for next visit: continue with current treatment.

## 2023-01-18 ENCOUNTER — APPOINTMENT (OUTPATIENT)
Dept: PHYSICAL THERAPY | Facility: MEDICAL CENTER | Age: 68
End: 2023-01-18
Attending: FAMILY MEDICINE
Payer: MEDICARE

## 2023-01-18 NOTE — OP THERAPY DAILY TREATMENT
"  Outpatient Physical Therapy  DAILY TREATMENT     [facility] Outpatient Physical Therapy  [address]  [place] [address]  Phone:  [phone]  Fax:  [phone]    Date: 01/18/2023    Patient: Lidia Dior  YOB: 1955  MRN: [id number]     Time Calculation                   Chief Complaint: No chief complaint on file.    Visit #: 3    SUBJECTIVE:  ***    OBJECTIVE:  Current objective measures: ***          Therapeutic Exercises (CPT 02990):     1. Recumbent bike, 6 minutes, L!    2. 4 way ankle t-band, 20x each, pink t-band    3. Foot short, 2x10 with toe extension stretch in between    4. Ankle board, 20x each PF/DF and inv/ev    5. Double leg heel raises, 20x    20. POC 01/11/2023 to  03/02/23      Therapeutic Exercise Summary: Patient educated to continue with current exercise regimen but to avoid aggravating activities such as treadmill walking at this time    Therapeutic Treatments and Modalities:     1. Manual Therapy (CPT 15602), Prone IASTM to L tibialis posterior, plantar fascia, gastroc/soleus  Time-based treatments/modalities:           Pain rating (1-10) before treatment:  {PAIN NUMBERS_1-10:63506}  Pain rating (1-10) after treatment:  {PAIN NUMBERS_1-10:96868}    ASSESSMENT:   Response to treatment: ***    PLAN/RECOMMENDATIONS:   Plan for treatment: {AMB OP THERAPY - THERAPY PLAN:[id number]::\"therapy treatment to continue next visit\"}.  Planned interventions for next visit: {PT PLANNED THERAPY INTERVENTIONS:010172747::\"continue with current treatment\"}.         "

## 2023-01-23 ENCOUNTER — PHYSICAL THERAPY (OUTPATIENT)
Dept: PHYSICAL THERAPY | Facility: MEDICAL CENTER | Age: 68
End: 2023-01-23
Attending: FAMILY MEDICINE
Payer: MEDICARE

## 2023-01-23 DIAGNOSIS — M72.2 PLANTAR FASCIITIS OF LEFT FOOT: ICD-10-CM

## 2023-01-23 PROCEDURE — 97110 THERAPEUTIC EXERCISES: CPT

## 2023-01-23 PROCEDURE — 97140 MANUAL THERAPY 1/> REGIONS: CPT

## 2023-01-23 NOTE — OP THERAPY DAILY TREATMENT
"  Outpatient Physical Therapy  DAILY TREATMENT     [facility] Outpatient Physical Therapy  [address]  [place] [address]  Phone:  [phone]  Fax:  [phone]    Date: 01/23/2023    Patient: Lidia Dior  YOB: 1955  MRN: [id number]     Time Calculation    Start time: 1533  Stop time: 1611 Time Calculation (min): 38 minutes     Chief Complaint: Foot Problem    Visit #: 3    SUBJECTIVE:  The patient states that she felt good after last visit, and was able to do the elliptical for 3 miles at the gym. She was not in pain during the workout but did have some pain the next day. The patient saw the podiatrist who fitted her for an arch support and gave her recommendations on footwear.     OBJECTIVE:        Therapeutic Exercises (CPT 25897):     1. Recumbent bike, 6 minutes, L2    2. 4 way ankle t-band, x15 with purple t-band    3. Foot short, x10    4. BAPS board, 2x10 DF/PF, inv/ev    5. Double leg heel raises, x20    6. Gastroc/soleus stretch, 3x30\" each    7. AirEx marches, x10 each    20. POC 01/11/2023 to  03/02/23      Therapeutic Exercise Summary: Patient educated to continue with current exercise regimen but to avoid aggravating activities such as treadmill walking at this time    Therapeutic Treatments and Modalities:     1. Manual Therapy (CPT 40425), Prone IASTM to L tibialis posterior, plantar fascia, gastroc/soleus    Time-based treatments/modalities:    Physical Therapy Timed Treatment Charges  Manual therapy minutes (CPT 79516): 8 minutes  Therapeutic exercise minutes (CPT 46684): 30 minutes    ASSESSMENT:   Response to treatment: The patient continues to tolerate a progression in therex for foot and ankle strength and stability without an increase in symptoms. She had improved active motion against resistance into eversion and inversion this session. The patient will continue to benefit from skilled physical therapy services to maximize " return to active PLOF.     PLAN/RECOMMENDATIONS:   Plan for treatment: therapy treatment to continue next visit.  Planned interventions for next visit: continue with current treatment.

## 2023-01-25 ENCOUNTER — PHYSICAL THERAPY (OUTPATIENT)
Dept: PHYSICAL THERAPY | Facility: MEDICAL CENTER | Age: 68
End: 2023-01-25
Attending: FAMILY MEDICINE
Payer: MEDICARE

## 2023-01-25 DIAGNOSIS — M72.2 PLANTAR FASCIITIS OF LEFT FOOT: ICD-10-CM

## 2023-01-25 PROCEDURE — 97110 THERAPEUTIC EXERCISES: CPT

## 2023-01-25 PROCEDURE — 97140 MANUAL THERAPY 1/> REGIONS: CPT

## 2023-01-25 NOTE — OP THERAPY DAILY TREATMENT
"  Outpatient Physical Therapy  DAILY TREATMENT     [facility] Outpatient Physical Therapy  [address]  [place] [address]  Phone:  [phone]  Fax:  [phone]    Date: 01/25/2023    Patient: Lidia Dior  YOB: 1955  MRN: [id number]     Time Calculation    Start time: 1030  Stop time: 1110 Time Calculation (min): 40 minutes     Chief Complaint: Foot Problem    Visit #: 4    SUBJECTIVE:  The patient states that she woke with a throbbing pain in her heel last night but woke without symptoms this morning. She is agreeable to PT.     OBJECTIVE:        Therapeutic Exercises (CPT 22019):     1. Recumbent bike, 6 minutes, L2    2. 4 way ankle t-band    3. Foot short - standing    4. Ankle board, 2x10 each    5. Double leg heel raises, x10 with single leg eccentric descent    6. Gastroc/soleus stretch, 3x30\" each    7. AirEx marches, x10 each side    8. AirEx ankle sways, x20 with cueing to maintain foot short    9. Plantar fascia ball roll, x1 minute    20. POC 01/11/2023 to  03/02/23      Therapeutic Exercise Summary: Patient educated to continue with current exercise regimen but to avoid aggravating activities such as treadmill walking at this time    Therapeutic Treatments and Modalities:     1. Manual Therapy (CPT 41638), Prone IASTM to L tibialis posterior, plantar fascia, gastroc/soleus    Time-based treatments/modalities:    Physical Therapy Timed Treatment Charges  Manual therapy minutes (CPT 07269): 8 minutes  Therapeutic exercise minutes (CPT 27479): 30 minutes    ASSESSMENT:   Response to treatment: The patient continues to tolerate a progression in therex for foot and ankle stabilization without an increase in symptoms. She is demonstrating improved soft tissue tension with manual therapy techniques. The patient will continue to benefit from skilled physical therapy services to maximize return to OF.     PLAN/RECOMMENDATIONS:   Plan for " treatment: therapy treatment to continue next visit.  Planned interventions for next visit: continue with current treatment.

## 2023-01-30 ENCOUNTER — PHYSICAL THERAPY (OUTPATIENT)
Dept: PHYSICAL THERAPY | Facility: MEDICAL CENTER | Age: 68
End: 2023-01-30
Attending: FAMILY MEDICINE
Payer: MEDICARE

## 2023-01-30 DIAGNOSIS — M72.2 PLANTAR FASCIITIS OF LEFT FOOT: ICD-10-CM

## 2023-01-30 PROCEDURE — 97110 THERAPEUTIC EXERCISES: CPT

## 2023-01-30 PROCEDURE — 97140 MANUAL THERAPY 1/> REGIONS: CPT

## 2023-01-30 NOTE — OP THERAPY DAILY TREATMENT
"  Outpatient Physical Therapy  DAILY TREATMENT     [facility] Outpatient Physical Therapy  [address]  [place] [address]  Phone:  [phone]  Fax:  [phone]    Date: 01/30/2023    Patient: Lidia Dior  YOB: 1955  MRN: [id number]     Time Calculation    Start time: 1031  Stop time: 1109 Time Calculation (min): 38 minutes     Chief Complaint: Foot Problem    Visit #: 5    SUBJECTIVE:  The patient states that her pain is still present, especially in the morning and after prolonged sitting. The patient will follow up with the podiatrist in a few weeks. She states that she has also started some gentle massage and ROM exercises for the ankle.     OBJECTIVE:        Therapeutic Exercises (CPT 27423):     1. Recumbent bike, 6 minutes, L2    2. 4 way ankle t-band, x20, pink t-band    3. Foot short - standing, x25    4. Ankle board, Not today    5. Double leg heel raises, HEP    6. Gastroc/soleus stretch, 2x30\" each    7. AirEx marches, 2x10 each    8. AirEx ankle sways, 2x10    9. Plantar fascia ball roll, x2 minutes    10. 1st toe extension AROM, 2x10    20. POC 01/11/2023 to  03/02/23      Therapeutic Exercise Summary: Patient educated to continue with current exercise regimen but to avoid aggravating activities such as treadmill walking at this time    Therapeutic Treatments and Modalities:     1. Manual Therapy (CPT 01140), Prone IASTM to L tibialis posterior, plantar fascia, gastroc/soleus    Time-based treatments/modalities:    Physical Therapy Timed Treatment Charges  Manual therapy minutes (CPT 37597): 8 minutes  Therapeutic exercise minutes (CPT 71504): 30 minutes    ASSESSMENT:   Response to treatment: The patient had increased tenderness at her 5th metatarsal head this session. She tolerated gentle progression in foot activation and strengthening without irritation of her symptoms. She is still limited in higher level weight bearing exercises " such as treadmill walking. The patient will continue to benefit from skilled physical therapy services to maximize return to PLOF.     PLAN/RECOMMENDATIONS:   Plan for treatment: therapy treatment to continue next visit.  Planned interventions for next visit: continue with current treatment.

## 2023-02-01 ENCOUNTER — APPOINTMENT (OUTPATIENT)
Dept: PHYSICAL THERAPY | Facility: MEDICAL CENTER | Age: 68
End: 2023-02-01
Attending: FAMILY MEDICINE
Payer: MEDICARE

## 2023-02-07 DIAGNOSIS — G47.09 OTHER INSOMNIA: ICD-10-CM

## 2023-02-07 RX ORDER — DOXEPIN HYDROCHLORIDE 10 MG/1
10 CAPSULE ORAL NIGHTLY PRN
Qty: 30 CAPSULE | Refills: 1 | Status: SHIPPED | OUTPATIENT
Start: 2023-02-07 | End: 2023-02-22

## 2023-02-07 NOTE — TELEPHONE ENCOUNTER
Received request via: Pharmacy    Was the patient seen in the last year in this department? Yes    Does the patient have an active prescription (recently filled or refills available) for medication(s) requested? No    Does the patient have long-term Plus and need 100 day supply (blood pressure, diabetes and cholesterol meds only)? Medication is not for cholesterol, blood pressure or diabetes

## 2023-02-08 ENCOUNTER — APPOINTMENT (OUTPATIENT)
Dept: PHYSICAL THERAPY | Facility: MEDICAL CENTER | Age: 68
End: 2023-02-08
Attending: FAMILY MEDICINE
Payer: MEDICARE

## 2023-02-14 ENCOUNTER — APPOINTMENT (OUTPATIENT)
Dept: BEHAVIORAL HEALTH | Facility: CLINIC | Age: 68
End: 2023-02-14
Payer: MEDICARE

## 2023-02-21 DIAGNOSIS — G47.09 OTHER INSOMNIA: ICD-10-CM

## 2023-02-22 RX ORDER — DOXEPIN HYDROCHLORIDE 10 MG/1
10 CAPSULE ORAL NIGHTLY PRN
Qty: 90 CAPSULE | Refills: 1 | Status: SHIPPED | OUTPATIENT
Start: 2023-02-22 | End: 2023-05-02

## 2023-04-26 ENCOUNTER — TELEPHONE (OUTPATIENT)
Dept: PHYSICAL THERAPY | Facility: MEDICAL CENTER | Age: 68
End: 2023-04-26
Payer: MEDICARE

## 2023-04-26 NOTE — OP THERAPY DISCHARGE SUMMARY
Outpatient Physical Therapy  DISCHARGE SUMMARY NOTE      [facility] Outpatient Physical Therapy  [address]  [place] [address]  Phone:  [phone]  Fax:  [phone]    Date of Visit: 04/26/2023    Patient: Lidia Dior  YOB: 1955  MRN: [id number]     Referring Provider: Margi Gaines M.D.  [address]  [place] [address]   Referring Diagnosis Plantar fasciitis of left foot [M72.2]           Your patient is being discharged from Physical Therapy with the following comments:   Patient has failed to schedule or reschedule follow-up visits    Comments:  Lidia Dior was seen for 5 PT visits between 1/11/23-1/30/23 for plantar fasciitis. She has not been since since 1/30/23. The patient will be discharged at this time due to time since last visit.      Recommendations:  Recommend that pt request a new PT referral if further PT intervention is needed.     Mena Cerda, PT    Date: 4/26/2023

## 2023-05-02 ENCOUNTER — TELEPHONE (OUTPATIENT)
Dept: MEDICAL GROUP | Facility: PHYSICIAN GROUP | Age: 68
End: 2023-05-02
Payer: MEDICARE

## 2023-05-02 ENCOUNTER — TELEMEDICINE (OUTPATIENT)
Dept: MEDICAL GROUP | Facility: PHYSICIAN GROUP | Age: 68
End: 2023-05-02
Payer: MEDICARE

## 2023-05-02 VITALS — BODY MASS INDEX: 24 KG/M2 | TEMPERATURE: 100.6 F | HEIGHT: 66 IN | RESPIRATION RATE: 14 BRPM

## 2023-05-02 DIAGNOSIS — U07.1 COVID-19 VIRUS INFECTION: ICD-10-CM

## 2023-05-02 PROCEDURE — 99214 OFFICE O/P EST MOD 30 MIN: CPT | Mod: CS,95 | Performed by: INTERNAL MEDICINE

## 2023-05-02 RX ORDER — ONDANSETRON 4 MG/1
4 TABLET, ORALLY DISINTEGRATING ORAL EVERY 6 HOURS PRN
Qty: 24 TABLET | Refills: 0 | Status: SHIPPED | OUTPATIENT
Start: 2023-05-02 | End: 2023-07-03

## 2023-05-02 NOTE — TELEPHONE ENCOUNTER
Normally we like to have a recent kidney function on hand but her one from last year was normal so if she wants a prescription for paxlovid I can send it in, she will need to check with her pharmacist about whether there is any interaction with doxepin.

## 2023-05-02 NOTE — TELEPHONE ENCOUNTER
1. Caller Name: Lidia Dior                          Call Back Number: [phone] (home)         How would the patient prefer to be contacted with a response: Phone call OK to leave a detailed message    Lidia called has body aches cough and fever    had covid last week   Tested positive for covid this morning Can you prescribe her paxlovid?

## 2023-05-03 NOTE — ASSESSMENT & PLAN NOTE
New decompensated problem.  After discussion of potential treatment options we have elected to proceed with Paxlovid 300/120 x 150 mg and 10 x 100 mg tablet pack.  She will be diligent with water intake.  She is not on any prescription medicines that have drug interactions with Paxlovid.  We will prescribe Zofran as needed for nausea and vomiting due to history of GI upset related to antiviral therapy.  She will contact her neighbor who is an RN to get a pulse oximeter and send me updated vital sign readings over the coming days.  She will also keep me updated with how she is feeling and proceed to urgent care emergency department if she would feel that she is declining.  Also offered in office appointment to check vital signs and lung exam should she not be feeling better precipitously.  She was appreciative of the visit and will keep me and my staff updated in the interim.

## 2023-05-03 NOTE — PROGRESS NOTES
Virtual Visit: Established Patient   This visit was conducted via  [employer]  using secure and encrypted videoconferencing technology.   The patient was in their home in the state of [place].    The patient's identity was confirmed and verbal consent was obtained for this virtual visit.    Subjective:   CC: No chief complaint on file.    Lidia Dior is a 67 y.o. female presenting for evaluation and management of:    Problem   Covid-19 Virus Infection    She was exposed to her  who tested positive for COVID-19 infection last week.  She tested herself this past Friday and Sunday with home testing and they had returned as negative.  On Monday evening she developed sore throat followed by myalgias and low-grade fever.  On Tuesday morning she tested positive for COVID on home testing.  Fever at 100.6 °F.  She does not have any equipment to check her oxygen, heart rate, her blood pressure at home but her neighbor is an RN and she will plan to reach out to get that equipment to follow-up on her other vital signs.  In addition to sore throat and fever she has diffuse myalgias, lethargy, cough, and feels generally weak.  She has history of nausea related to taking medications for colds and would like to have something on hand in that situation such as Zofran.  She has no history of renal impairment.  Her only prescription medicine is [place] Thyroid and otherwise she only takes supplements.  She has been diligent with water intake.  Her  is currently on the mend from his infection.  She will also plan to try TheraFlu for OTC relief.          ROS   Fever, muscle aches, sore throat, cough, phlegm, malaise    Current medicines (including changes today)  Current Outpatient Medications   Medication Sig Dispense Refill    Nirmatrelvir&Ritonavir 300/100 20 x 150 MG & 10 x 100MG Tablet Therapy Pack Take 300 mg nirmatrelvir (two 150 mg tablets) with 100 mg ritonavir (one 100 mg tablet) by mouth, with all three  "tablets taken together twice daily for 5 days. 30 Each 0    ondansetron (ZOFRAN ODT) 4 MG TABLET DISPERSIBLE Take 1 Tablet by mouth every 6 hours as needed for Nausea/Vomiting. 24 Tablet 0    ARMOUR THYROID 90 MG Tab TAKE 1 TABLET BY MOUTH EVERY MORNING ON AN EMPTY STOMACH 100 Tablet 3    Probiotic Product (PROBIOTIC PO)       Multiple Vitamins-Minerals (HAIR SKIN AND NAILS FORMULA PO)       Cholecalciferol (VITAMIN D-3) 5000 units Tab Take  by mouth.      Vitamin E 400 UNIT Tab Take  by mouth.      B Complex Vitamins (B COMPLEX PO) Take  by mouth.      CALCIUM-MAGNESIUM-ZINC PO Take  by mouth.       No current facility-administered medications for this visit.       Patient Active Problem List    Diagnosis Date Noted    COVID-19 virus infection 05/02/2023    Moderate episode of recurrent major depressive disorder (HCC) 01/10/2023    Other insomnia 01/10/2023    Pain of left heel 01/09/2023    Plantar fasciitis of left foot 11/22/2022    Skin lesion 07/07/2022    Trigger ring finger of right hand 07/07/2022    Lump of left wrist 07/07/2022    Elevated alkaline phosphatase level 07/07/2022    Palpitations 03/08/2022    Allergic rhinitis 03/08/2022    Right hip pain 10/13/2021    Thyroid nodule 10/13/2021    Microcalcification of left breast on mammogram 10/13/2021    Advance care planning 10/13/2021    Dyslipidemia 10/03/2018    Healthcare maintenance 07/16/2018    Hypothyroidism 07/16/2018    Vitamin D deficiency 07/16/2018    B12 deficiency 07/16/2018    Postsurgical menopause 07/16/2018        Objective:   Temp (!) 38.1 °C (100.6 °F)   Resp 14   Ht 1.676 m (5' 6\")   BMI 24.00 kg/m²     Physical Exam:  Constitutional: mildly ill, fatigued  Skin: No rashes in visible areas.  Eye: Round. Conjunctiva clear, lids normal. No icterus.   ENMT: Lips pink without lesions, good dentition, moist mucous membranes. Phonation normal.  Neck: No masses, no thyromegaly. Moves freely without pain.  Respiratory: Unlabored " respiratory effort, no audible wheeze; hoarseness and dry cough  Psych: Alert and oriented, normal affect and mood.     Assessment and Plan:   The following treatment plan was discussed:     Problem List Items Addressed This Visit       COVID-19 virus infection     New decompensated problem.  After discussion of potential treatment options we have elected to proceed with Paxlovid 300/120 x 150 mg and 10 x 100 mg tablet pack.  She will be diligent with water intake.  She is not on any prescription medicines that have drug interactions with Paxlovid.  We will prescribe Zofran as needed for nausea and vomiting due to history of GI upset related to antiviral therapy.  She will contact her neighbor who is an RN to get a pulse oximeter and send me updated vital sign readings over the coming days.  She will also keep me updated with how she is feeling and proceed to urgent care emergency department if she would feel that she is declining.  Also offered in office appointment to check vital signs and lung exam should she not be feeling better precipitously.  She was appreciative of the visit and will keep me and my staff updated in the interim.         Relevant Medications    Nirmatrelvir&Ritonavir 300/100 20 x 150 MG & 10 x 100MG Tablet Therapy Pack    ondansetron (ZOFRAN ODT) 4 MG TABLET DISPERSIBLE          Follow-up: Return if symptoms worsen or fail to improve.         I spent a total of 30 minutes with record review, exam, communication with the patient, communication with other providers, and documentation of this encounter.     Lesa Garcia, DO  Geriatric and Internal Medicine  [facility]

## 2023-06-01 ENCOUNTER — HOSPITAL ENCOUNTER (OUTPATIENT)
Dept: RADIOLOGY | Facility: MEDICAL CENTER | Age: 68
End: 2023-06-01
Attending: INTERNAL MEDICINE
Payer: MEDICARE

## 2023-06-01 DIAGNOSIS — E04.1 THYROID NODULE: ICD-10-CM

## 2023-06-01 PROCEDURE — 76536 US EXAM OF HEAD AND NECK: CPT

## 2023-06-29 ENCOUNTER — HOSPITAL ENCOUNTER (OUTPATIENT)
Dept: LAB | Facility: MEDICAL CENTER | Age: 68
End: 2023-06-29
Attending: INTERNAL MEDICINE
Payer: MEDICARE

## 2023-06-29 DIAGNOSIS — Z78.0 MENOPAUSE: ICD-10-CM

## 2023-06-29 DIAGNOSIS — E03.9 HYPOTHYROIDISM, UNSPECIFIED TYPE: ICD-10-CM

## 2023-06-29 DIAGNOSIS — E55.9 VITAMIN D DEFICIENCY: ICD-10-CM

## 2023-06-29 DIAGNOSIS — R74.8 ELEVATED ALKALINE PHOSPHATASE LEVEL: ICD-10-CM

## 2023-06-29 DIAGNOSIS — E78.5 DYSLIPIDEMIA: ICD-10-CM

## 2023-06-29 DIAGNOSIS — E53.8 B12 DEFICIENCY: ICD-10-CM

## 2023-06-29 LAB
25(OH)D3 SERPL-MCNC: 46 NG/ML (ref 30–100)
ALBUMIN SERPL BCP-MCNC: 4.3 G/DL (ref 3.2–4.9)
ALBUMIN/GLOB SERPL: 1.7 G/DL
ALP SERPL-CCNC: 101 U/L (ref 30–99)
ALT SERPL-CCNC: 18 U/L (ref 2–50)
ANION GAP SERPL CALC-SCNC: 10 MMOL/L (ref 7–16)
AST SERPL-CCNC: 17 U/L (ref 12–45)
BASOPHILS # BLD AUTO: 1.2 % (ref 0–1.8)
BASOPHILS # BLD: 0.05 K/UL (ref 0–0.12)
BILIRUB SERPL-MCNC: 0.5 MG/DL (ref 0.1–1.5)
BUN SERPL-MCNC: 17 MG/DL (ref 8–22)
CALCIUM ALBUM COR SERPL-MCNC: 9.2 MG/DL (ref 8.5–10.5)
CALCIUM SERPL-MCNC: 9.4 MG/DL (ref 8.4–10.2)
CHLORIDE SERPL-SCNC: 105 MMOL/L (ref 96–112)
CHOLEST SERPL-MCNC: 167 MG/DL (ref 100–199)
CO2 SERPL-SCNC: 24 MMOL/L (ref 20–33)
CREAT SERPL-MCNC: 0.67 MG/DL (ref 0.5–1.4)
EOSINOPHIL # BLD AUTO: 0.14 K/UL (ref 0–0.51)
EOSINOPHIL NFR BLD: 3.3 % (ref 0–6.9)
ERYTHROCYTE [DISTWIDTH] IN BLOOD BY AUTOMATED COUNT: 39.6 FL (ref 35.9–50)
FASTING STATUS PATIENT QL REPORTED: NORMAL
GFR SERPLBLD CREATININE-BSD FMLA CKD-EPI: 96 ML/MIN/1.73 M 2
GLOBULIN SER CALC-MCNC: 2.5 G/DL (ref 1.9–3.5)
GLUCOSE SERPL-MCNC: 99 MG/DL (ref 65–99)
HCT VFR BLD AUTO: 43.6 % (ref 37–47)
HDLC SERPL-MCNC: 51 MG/DL
HGB BLD-MCNC: 15.1 G/DL (ref 12–16)
IMM GRANULOCYTES # BLD AUTO: 0 K/UL (ref 0–0.11)
IMM GRANULOCYTES NFR BLD AUTO: 0 % (ref 0–0.9)
LDLC SERPL CALC-MCNC: 90 MG/DL
LYMPHOCYTES # BLD AUTO: 1.22 K/UL (ref 1–4.8)
LYMPHOCYTES NFR BLD: 29.1 % (ref 22–41)
MCH RBC QN AUTO: 31.4 PG (ref 27–33)
MCHC RBC AUTO-ENTMCNC: 34.6 G/DL (ref 32.2–35.5)
MCV RBC AUTO: 90.6 FL (ref 81.4–97.8)
MONOCYTES # BLD AUTO: 0.31 K/UL (ref 0–0.85)
MONOCYTES NFR BLD AUTO: 7.4 % (ref 0–13.4)
NEUTROPHILS # BLD AUTO: 2.47 K/UL (ref 1.82–7.42)
NEUTROPHILS NFR BLD: 59 % (ref 44–72)
NRBC # BLD AUTO: 0 K/UL
NRBC BLD-RTO: 0 /100 WBC (ref 0–0.2)
PLATELET # BLD AUTO: 252 K/UL (ref 164–446)
PMV BLD AUTO: 9.7 FL (ref 9–12.9)
POTASSIUM SERPL-SCNC: 4.2 MMOL/L (ref 3.6–5.5)
PROT SERPL-MCNC: 6.8 G/DL (ref 6–8.2)
RBC # BLD AUTO: 4.81 M/UL (ref 4.2–5.4)
SODIUM SERPL-SCNC: 139 MMOL/L (ref 135–145)
T3FREE SERPL-MCNC: 5.05 PG/ML (ref 2–4.4)
T4 FREE SERPL-MCNC: 1.09 NG/DL (ref 0.93–1.7)
TRIGL SERPL-MCNC: 128 MG/DL (ref 0–149)
TSH SERPL DL<=0.005 MIU/L-ACNC: 0.24 UIU/ML (ref 0.38–5.33)
VIT B12 SERPL-MCNC: 497 PG/ML (ref 211–911)
WBC # BLD AUTO: 4.2 K/UL (ref 4.8–10.8)

## 2023-06-29 PROCEDURE — 80053 COMPREHEN METABOLIC PANEL: CPT

## 2023-06-29 PROCEDURE — 82306 VITAMIN D 25 HYDROXY: CPT

## 2023-06-29 PROCEDURE — 84080 ASSAY ALKALINE PHOSPHATASES: CPT

## 2023-06-29 PROCEDURE — 84443 ASSAY THYROID STIM HORMONE: CPT

## 2023-06-29 PROCEDURE — 82607 VITAMIN B-12: CPT

## 2023-06-29 PROCEDURE — 84481 FREE ASSAY (FT-3): CPT

## 2023-06-29 PROCEDURE — 36415 COLL VENOUS BLD VENIPUNCTURE: CPT

## 2023-06-29 PROCEDURE — 80061 LIPID PANEL: CPT

## 2023-06-29 PROCEDURE — 84439 ASSAY OF FREE THYROXINE: CPT

## 2023-06-29 PROCEDURE — 84075 ASSAY ALKALINE PHOSPHATASE: CPT | Mod: XU

## 2023-06-29 PROCEDURE — 85025 COMPLETE CBC W/AUTO DIFF WBC: CPT

## 2023-06-30 ENCOUNTER — HOSPITAL ENCOUNTER (OUTPATIENT)
Dept: RADIOLOGY | Facility: MEDICAL CENTER | Age: 68
End: 2023-06-30
Attending: INTERNAL MEDICINE
Payer: MEDICARE

## 2023-06-30 DIAGNOSIS — Z12.31 VISIT FOR SCREENING MAMMOGRAM: ICD-10-CM

## 2023-06-30 PROCEDURE — 77063 BREAST TOMOSYNTHESIS BI: CPT

## 2023-07-02 LAB
ALP BONE SERPL-CCNC: 60 U/L (ref 0–55)
ALP ISOS SERPL HS-CCNC: 0 U/L
ALP LIVER SERPL-CCNC: 49 U/L (ref 0–94)
ALP SERPL-CCNC: 109 U/L (ref 40–120)

## 2023-07-03 ENCOUNTER — OFFICE VISIT (OUTPATIENT)
Dept: MEDICAL GROUP | Facility: PHYSICIAN GROUP | Age: 68
End: 2023-07-03
Payer: MEDICARE

## 2023-07-03 VITALS
SYSTOLIC BLOOD PRESSURE: 116 MMHG | DIASTOLIC BLOOD PRESSURE: 62 MMHG | HEART RATE: 74 BPM | WEIGHT: 145.6 LBS | OXYGEN SATURATION: 99 % | TEMPERATURE: 97.3 F | BODY MASS INDEX: 23.5 KG/M2 | RESPIRATION RATE: 12 BRPM

## 2023-07-03 DIAGNOSIS — E03.9 HYPOTHYROIDISM, UNSPECIFIED TYPE: ICD-10-CM

## 2023-07-03 DIAGNOSIS — E04.1 THYROID NODULE: ICD-10-CM

## 2023-07-03 DIAGNOSIS — L57.8 SUN-DAMAGED SKIN: ICD-10-CM

## 2023-07-03 DIAGNOSIS — E78.5 DYSLIPIDEMIA: ICD-10-CM

## 2023-07-03 DIAGNOSIS — R74.8 ELEVATED ALKALINE PHOSPHATASE LEVEL: ICD-10-CM

## 2023-07-03 PROBLEM — F33.1 MODERATE EPISODE OF RECURRENT MAJOR DEPRESSIVE DISORDER (HCC): Status: RESOLVED | Noted: 2023-01-10 | Resolved: 2023-07-03

## 2023-07-03 PROBLEM — E53.8 B12 DEFICIENCY: Status: RESOLVED | Noted: 2018-07-16 | Resolved: 2023-07-03

## 2023-07-03 PROBLEM — G47.09 OTHER INSOMNIA: Status: RESOLVED | Noted: 2023-01-10 | Resolved: 2023-07-03

## 2023-07-03 PROBLEM — U07.1 COVID-19 VIRUS INFECTION: Status: RESOLVED | Noted: 2023-05-02 | Resolved: 2023-07-03

## 2023-07-03 PROCEDURE — 3074F SYST BP LT 130 MM HG: CPT | Performed by: INTERNAL MEDICINE

## 2023-07-03 PROCEDURE — 99214 OFFICE O/P EST MOD 30 MIN: CPT | Performed by: INTERNAL MEDICINE

## 2023-07-03 PROCEDURE — 3078F DIAST BP <80 MM HG: CPT | Performed by: INTERNAL MEDICINE

## 2023-07-03 RX ORDER — HYDROQUINONE 40 MG/G
1 CREAM TOPICAL DAILY
Qty: 28.35 G | Refills: 1 | Status: SHIPPED | OUTPATIENT
Start: 2023-07-03 | End: 2023-11-14

## 2023-07-03 ASSESSMENT — FIBROSIS 4 INDEX: FIB4 SCORE: 1.05

## 2023-07-03 NOTE — PROGRESS NOTES
Subjective:   Chief Complaint/History of Present Illness:  Lidia Dior is a 67 y.o. female established patient who presents today to discuss medical problems as listed below. Lidia is unaccompanied for today's visit.    Problem   Sun-Damaged Skin    She reports sun damage which has led to melasma of her face. Has had good results with hydroquinone 4% cream in the past. Saw dermatology in March 2023 but forgot to ask for renewal of Rx.     Elevated Alkaline Phosphatase Level    Mild finding in 2022, bone predominant on isoenzymes, likely related to mild arthritis.    Previously running in the 80s.  No known liver disease.  She has slight arthritic changes in her hands.     Thyroid Nodule    Thyroid US (6/2023):      1.  Overall stable probably benign thyroid nodules.     ACR TI-RADS Recommendations  TR4 - follow up ultrasound in 1,2,3 and 5 yearss    Neck thyroid ultrasound due June 2024.        Dyslipidemia     Latest Reference Range & Units 06/29/23 10:13   Cholesterol,Tot 100 - 199 mg/dL 167   Triglycerides 0 - 149 mg/dL 128   HDL >=40 mg/dL 51   LDL <100 mg/dL 90     The 10-year ASCVD risk score (Soumya HALE, et al., 2019) is: 4.8%    She was found to have a history of elevated cholesterol.  She is very active.  She has been able to bring this down with her healthy lifestyle.       Hypothyroidism     Latest Reference Range & Units 06/29/23 10:13   TSH 0.380 - 5.330 uIU/mL 0.240 (L)   Free T-4 0.93 - 1.70 ng/dL 1.09   T3,Free 2.00 - 4.40 pg/mL 5.05 (H)     She reports diagnosis of hypothyroidism in 2008, she has been maintained on [place] Thyroid 90 mg daily with good results. She was also found to have a thyroid nodule on US in June 2021, stable on follow up in June 2022 and June 2023. Recheck in 2024 and 2026 and if stable can stop surveillance.    Current regimen: [place] Thyroid 90 mg daily          Current Medications:  Current Outpatient Medications Ordered in Epic   Medication Sig Dispense Refill     hydroquinone 4 % cream Apply 1 Application topically every day. 28.35 g 1    ARMOUR THYROID 90 MG Tab TAKE 1 TABLET BY MOUTH EVERY DAY IN THE MORNING ON AN EMPTY STOMACH 100 Tablet 3    Probiotic Product (PROBIOTIC PO)       Multiple Vitamins-Minerals (HAIR SKIN AND NAILS FORMULA PO)       Cholecalciferol (VITAMIN D-3) 5000 units Tab Take  by mouth.      Vitamin E 400 UNIT Tab Take  by mouth.      B Complex Vitamins (B COMPLEX PO) Take  by mouth.      CALCIUM-MAGNESIUM-ZINC PO Take  by mouth.       No current Epic-ordered facility-administered medications on file.          Objective:   Physical Exam:    Vitals: /62 (BP Location: Left arm, Patient Position: Sitting, BP Cuff Size: Adult)   Pulse 74   Temp 36.3 °C (97.3 °F) (Temporal)   Resp 12   Wt 66 kg (145 lb 9.6 oz)   SpO2 99%    BMI: Body mass index is 23.5 kg/m² (pended).  Physical Exam  Constitutional:       General: She is not in acute distress.     Appearance: Normal appearance. She is not ill-appearing.   HENT:      Right Ear: External ear normal.      Left Ear: External ear normal.   Eyes:      General: No scleral icterus.     Conjunctiva/sclera: Conjunctivae normal.   Cardiovascular:      Rate and Rhythm: Normal rate and regular rhythm.      Pulses: Normal pulses.      Heart sounds: No murmur heard.  Pulmonary:      Effort: Pulmonary effort is normal. No respiratory distress.      Breath sounds: No wheezing, rhonchi or rales.   Abdominal:      General: Bowel sounds are normal. There is no distension.      Palpations: Abdomen is soft.      Tenderness: There is no abdominal tenderness.   Musculoskeletal:      Right lower leg: No edema.      Left lower leg: No edema.   Skin:     General: Skin is warm and dry.      Findings: No rash.      Comments: Melasma on face   Neurological:      Gait: Gait normal.   Psychiatric:         Mood and Affect: Mood normal.         Behavior: Behavior normal.         Thought Content: Thought content normal.          Judgment: Judgment normal.          Assessment and Plan:   Lidia is a 67 y.o. female with the following:  Problem List Items Addressed This Visit       Dyslipidemia     Chronic improved problem, lipids are at goal, continue with healthy diet and regular exercise, no indication for pharmacotherapy.         Elevated alkaline phosphatase level     Chronic ongoing problem, mild, isoenzymes suggest bone etiology, likely from arthritis, monitor annually.         Hypothyroidism     Chronic ongoing problem, TSH mildly suppressed but FT4 levels normal and FT3 trending down. Continue [place] Thyroid 90 mg daily, no signs or symptoms of overtreatment at this time.         Sun-damaged skin     Chronic ongoing problem, will renew hydroquinone 4% cream, saw dermatology in March 2023 and was stable. Using for sun damaged skin. GoodRx coupon printed for patient. Minimize ongoing exposure longterm as it can thin out the skin.         Relevant Medications    hydroquinone 4 % cream    Thyroid nodule     Chronic ongoing problem, stable, continue with rechecks per guidelines, due in June 2024 and then June 2026 and can stop at that time if stable.               RTC: Return in about 6 months (around 1/3/2024).    I spent a total of 32 minutes with record review, exam, communication with the patient, communication with other providers, and documentation of this encounter.    PLEASE NOTE: This dictation was created using voice recognition software. I have made every reasonable attempt to correct obvious errors, but I expect that there are errors of grammar and possibly content that I did not discover before finalizing the note.      Lesa Garcia, DO  Geriatric and Internal Medicine  [facility]

## 2023-07-03 NOTE — ASSESSMENT & PLAN NOTE
Chronic ongoing problem, will renew hydroquinone 4% cream, saw dermatology in March 2023 and was stable. Using for sun damaged skin. GoodRx coupon printed for patient. Minimize ongoing exposure longterm as it can thin out the skin.

## 2023-07-03 NOTE — ASSESSMENT & PLAN NOTE
Chronic improved problem, lipids are at goal, continue with healthy diet and regular exercise, no indication for pharmacotherapy.

## 2023-07-03 NOTE — ASSESSMENT & PLAN NOTE
Chronic ongoing problem, mild, isoenzymes suggest bone etiology, likely from arthritis, monitor annually.

## 2023-07-03 NOTE — ASSESSMENT & PLAN NOTE
Chronic ongoing problem, stable, continue with rechecks per guidelines, due in June 2024 and then June 2026 and can stop at that time if stable.

## 2023-07-03 NOTE — ASSESSMENT & PLAN NOTE
Chronic ongoing problem, TSH mildly suppressed but FT4 levels normal and FT3 trending down. Continue [place] Thyroid 90 mg daily, no signs or symptoms of overtreatment at this time.

## 2023-08-24 ENCOUNTER — HOSPITAL ENCOUNTER (OUTPATIENT)
Dept: RADIOLOGY | Facility: MEDICAL CENTER | Age: 68
End: 2023-08-24
Attending: INTERNAL MEDICINE
Payer: MEDICARE

## 2023-08-24 DIAGNOSIS — Z78.0 MENOPAUSE: ICD-10-CM

## 2023-08-24 PROCEDURE — 77080 DXA BONE DENSITY AXIAL: CPT

## 2023-09-07 ENCOUNTER — TELEPHONE (OUTPATIENT)
Dept: MEDICAL GROUP | Facility: PHYSICIAN GROUP | Age: 68
End: 2023-09-07
Payer: MEDICARE

## 2023-09-07 DIAGNOSIS — U07.1 COVID-19 VIRUS INFECTION: ICD-10-CM

## 2023-09-07 NOTE — TELEPHONE ENCOUNTER
Received request via: Patient    Was the patient seen in the last year in this department? Yes    Does the patient have an active prescription (recently filled or refills available) for medication(s) requested? No    Does the patient have half-way Plus and need 100 day supply (blood pressure, diabetes and cholesterol meds only)? Medication is not for cholesterol, blood pressure or diabetes    Came down yesterday with sore throat and very tired  tested today and is positive

## 2023-09-10 ENCOUNTER — DOCUMENTATION (OUTPATIENT)
Dept: MEDICAL GROUP | Facility: MEDICAL CENTER | Age: 68
End: 2023-09-10
Payer: MEDICARE

## 2023-09-11 NOTE — PROGRESS NOTES
Received call from patient regarding her persistent symptoms from COVID-19 including dry cough, throat discomfort, chest tightness. She has been taking Paxlovid since Thursday 9/7/23 without much improvement. I sent albuterol inhaler as needed for shortness of breath. Refraining benzonatate for now to avoid possible bronchospasm. Advised patient to get over the counter Mucinex/DM to take as needed for cough and check her oxygen saturation at home with pulse oximeter to make sure O2 saturation remains above 90%. Strict ED precautions discussed. Follow up with PCP sooner as needed.

## 2023-11-02 ENCOUNTER — TELEPHONE (OUTPATIENT)
Dept: MEDICAL GROUP | Facility: PHYSICIAN GROUP | Age: 68
End: 2023-11-02
Payer: MEDICARE

## 2023-11-02 NOTE — TELEPHONE ENCOUNTER
1. Caller Name: Lidia Dior                          Call Back Number: [phone] (home)         How would the patient prefer to be contacted with a response: Phone call OK to leave a detailed message    Patient called to say she has had discomfort between her tummy and pelvis area and hasn't slept well for 2 nights.  Should she come in to see you?    Do you want me to schedule with Dr. Mclaughlin for tomorrow?

## 2023-11-03 ENCOUNTER — OFFICE VISIT (OUTPATIENT)
Dept: MEDICAL GROUP | Facility: PHYSICIAN GROUP | Age: 68
End: 2023-11-03
Payer: MEDICARE

## 2023-11-03 ENCOUNTER — HOSPITAL ENCOUNTER (OUTPATIENT)
Facility: MEDICAL CENTER | Age: 68
End: 2023-11-03
Attending: STUDENT IN AN ORGANIZED HEALTH CARE EDUCATION/TRAINING PROGRAM
Payer: MEDICARE

## 2023-11-03 VITALS
WEIGHT: 145.6 LBS | OXYGEN SATURATION: 95 % | DIASTOLIC BLOOD PRESSURE: 68 MMHG | HEART RATE: 74 BPM | HEIGHT: 66 IN | BODY MASS INDEX: 23.4 KG/M2 | TEMPERATURE: 97.9 F | SYSTOLIC BLOOD PRESSURE: 112 MMHG

## 2023-11-03 DIAGNOSIS — R10.84 GENERALIZED ABDOMINAL PAIN: ICD-10-CM

## 2023-11-03 DIAGNOSIS — Z23 NEED FOR VACCINATION: ICD-10-CM

## 2023-11-03 LAB
APPEARANCE UR: CLEAR
BILIRUB UR STRIP-MCNC: NORMAL MG/DL
COLOR UR AUTO: YELLOW
GLUCOSE UR STRIP.AUTO-MCNC: NORMAL MG/DL
KETONES UR STRIP.AUTO-MCNC: NORMAL MG/DL
LEUKOCYTE ESTERASE UR QL STRIP.AUTO: NORMAL
NITRITE UR QL STRIP.AUTO: NORMAL
PH UR STRIP.AUTO: 5.5 [PH] (ref 5–8)
PROT UR QL STRIP: 30 MG/DL
RBC UR QL AUTO: NORMAL
SP GR UR STRIP.AUTO: 1.03
UROBILINOGEN UR STRIP-MCNC: 0.2 MG/DL

## 2023-11-03 PROCEDURE — 87086 URINE CULTURE/COLONY COUNT: CPT

## 2023-11-03 PROCEDURE — 99214 OFFICE O/P EST MOD 30 MIN: CPT | Mod: 25 | Performed by: STUDENT IN AN ORGANIZED HEALTH CARE EDUCATION/TRAINING PROGRAM

## 2023-11-03 PROCEDURE — 3074F SYST BP LT 130 MM HG: CPT | Performed by: STUDENT IN AN ORGANIZED HEALTH CARE EDUCATION/TRAINING PROGRAM

## 2023-11-03 PROCEDURE — 81002 URINALYSIS NONAUTO W/O SCOPE: CPT | Performed by: STUDENT IN AN ORGANIZED HEALTH CARE EDUCATION/TRAINING PROGRAM

## 2023-11-03 PROCEDURE — G0009 ADMIN PNEUMOCOCCAL VACCINE: HCPCS | Performed by: STUDENT IN AN ORGANIZED HEALTH CARE EDUCATION/TRAINING PROGRAM

## 2023-11-03 PROCEDURE — 90677 PCV20 VACCINE IM: CPT | Performed by: STUDENT IN AN ORGANIZED HEALTH CARE EDUCATION/TRAINING PROGRAM

## 2023-11-03 PROCEDURE — 3078F DIAST BP <80 MM HG: CPT | Performed by: STUDENT IN AN ORGANIZED HEALTH CARE EDUCATION/TRAINING PROGRAM

## 2023-11-03 RX ORDER — ESCITALOPRAM OXALATE 10 MG/1
10 TABLET ORAL DAILY
COMMUNITY
Start: 2023-10-24

## 2023-11-03 ASSESSMENT — ENCOUNTER SYMPTOMS
CONSTIPATION: 0
POLYDIPSIA: 0
SEIZURES: 0
CLAUDICATION: 0
NECK PAIN: 0
NAUSEA: 0
HEADACHES: 0
HEARTBURN: 0
PALPITATIONS: 0
SPUTUM PRODUCTION: 0
DIZZINESS: 0
BLOOD IN STOOL: 0
HEMOPTYSIS: 0
DIAPHORESIS: 0
TINGLING: 0
FEVER: 0
EYE DISCHARGE: 0
FOCAL WEAKNESS: 0
VOMITING: 0
LOSS OF CONSCIOUSNESS: 0
COUGH: 0
SHORTNESS OF BREATH: 0
BLURRED VISION: 0
DIARRHEA: 0
BRUISES/BLEEDS EASILY: 0
FLANK PAIN: 0
ORTHOPNEA: 0
WHEEZING: 0
BACK PAIN: 0
EYE PAIN: 0
ABDOMINAL PAIN: 1
SPEECH CHANGE: 0
FALLS: 0
MYALGIAS: 0
WEIGHT LOSS: 0
CHILLS: 0
SENSORY CHANGE: 0
HALLUCINATIONS: 0
WEAKNESS: 0
DOUBLE VISION: 0
PND: 0
DEPRESSION: 0

## 2023-11-03 ASSESSMENT — FIBROSIS 4 INDEX: FIB4 SCORE: 1.07

## 2023-11-03 ASSESSMENT — LIFESTYLE VARIABLES: SUBSTANCE_ABUSE: 0

## 2023-11-03 NOTE — PATIENT INSTRUCTIONS
-If your belly pain does not feel better in 2 days, get CT scan done.  -okay to take OTC peptobismal and tylenol for bloating and pain, avoid ibuprofen.

## 2023-11-03 NOTE — PROGRESS NOTES
CC:  Diagnoses of Generalized abdominal pain and Need for vaccination were pertinent to this visit.    HISTORY OF THE PRESENT ILLNESS: Patient is a 68 y.o. female. This pleasant patient is here today for follow up visit.    No problems updated.    began 3 days ago. Located in lower abdomen and pelvic area.  Was at rest began. no trauma.   feels crampy sometimes sharp. Constantly there. Eating does not improve or make the pain worse. Pain is 6/10. No recent change in diet. Started taking lexapro mid October, but no other change in medication. Has heaviness when she pees. No vaginal discharge or itching. No n/v. No diarrhea. Last bowel movement was today. No blood in stools or black stool. Has a bowel movement every normal, but feels a little constipated.  Feels better today.  has bloating. No heartburn  No vaginal bleeding.  has a history of hysterectomy due to fibroids but has ovaries.   Mother had uterine cancer. No personal history of cancer.  Has a history of ovarian cyst.   Denies similar episode previously.    Health Maintenance: Completed      ROS:   Review of Systems   Constitutional:  Negative for chills, diaphoresis, fever and weight loss.   HENT:  Negative for ear discharge, ear pain, hearing loss and tinnitus.    Eyes:  Negative for blurred vision, double vision, pain and discharge.   Respiratory:  Negative for cough, hemoptysis, sputum production, shortness of breath and wheezing.    Cardiovascular:  Negative for chest pain, palpitations, orthopnea, claudication, leg swelling and PND.   Gastrointestinal:  Positive for abdominal pain. Negative for blood in stool, constipation, diarrhea, heartburn, melena, nausea and vomiting.   Genitourinary:  Negative for flank pain, hematuria and urgency.   Musculoskeletal:  Negative for back pain, falls, joint pain, myalgias and neck pain.   Skin:  Negative for itching and rash.   Neurological:  Negative for dizziness, tingling, sensory change, speech change, focal  "weakness, seizures, loss of consciousness, weakness and headaches.   Endo/Heme/Allergies:  Negative for polydipsia. Does not bruise/bleed easily.   Psychiatric/Behavioral:  Negative for depression, hallucinations, substance abuse and suicidal ideas.        /68 (BP Location: Left arm, Patient Position: Sitting)   Pulse 74   Temp 36.6 °C (97.9 °F) (Temporal)   Ht 1.676 m (5' 6\")   Wt 66 kg (145 lb 9.6 oz)   SpO2 95%   BMI 23.50 kg/m² Body mass index is 23.5 kg/m².    Physical Exam  Vitals reviewed.   Constitutional:       General: She is not in acute distress.     Appearance: She is not ill-appearing or diaphoretic.   HENT:      Head: Normocephalic and atraumatic.      Right Ear: External ear normal.      Left Ear: External ear normal.      Nose: No rhinorrhea.      Mouth/Throat:      Pharynx: No oropharyngeal exudate.   Eyes:      General: No scleral icterus.        Right eye: No discharge.         Left eye: No discharge.      Extraocular Movements: Extraocular movements intact.   Cardiovascular:      Rate and Rhythm: Normal rate and regular rhythm.      Heart sounds: Normal heart sounds. No murmur heard.  Pulmonary:      Effort: Pulmonary effort is normal. No respiratory distress.      Breath sounds: Normal breath sounds. No stridor. No wheezing, rhonchi or rales.   Abdominal:      General: There is no distension.      Palpations: Abdomen is soft. There is no mass.      Tenderness: There is abdominal tenderness. There is no right CVA tenderness, left CVA tenderness, guarding or rebound.      Comments: Diffusely tender to palpation in mid and lower quadrants but more pain in lower quadrants   Musculoskeletal:         General: No tenderness. Normal range of motion.      Cervical back: No rigidity or tenderness.      Right lower leg: No edema.      Left lower leg: No edema.   Lymphadenopathy:      Cervical: No cervical adenopathy.   Skin:     Capillary Refill: Capillary refill takes less than 2 seconds.     "  Findings: No bruising, erythema, lesion or rash.   Neurological:      General: No focal deficit present.      Mental Status: She is alert and oriented to person, place, and time. Mental status is at baseline.      Cranial Nerves: No cranial nerve deficit.      Sensory: No sensory deficit.      Motor: No weakness.      Deep Tendon Reflexes: Reflexes normal.   Psychiatric:         Mood and Affect: Mood normal.         Behavior: Behavior normal.         Thought Content: Thought content normal.         Judgment: Judgment normal.           Note: I have reviewed all pertinent labs and diagnostic tests associated with this visit with specific comments listed under the assessment and plan below    Assessment and Plan  68 y.o. female with the following -  1. Generalized abdominal pain  Acute, complicated, started 3 days ago, states improving. Has a history of ovarian cyst. Does not have uterus. No diarrhea. No heartburn. Differentials are UTI, SIBO, gastritis, peptic ulcer, ovarian cyst, ovarian cancer, psoas strain, etc. UA in clinic today is positive for trace blood and protein 30, negative for nitrite and leukocyte. Given trace blood and suspicion for UTI (suprapubic pain) , I am also sending it for culture. I ordered CT abd/pelvis for her to get done in case her abdominal pain does not get better in 2 days.  -okay to take OTC peptobismal and tylenol for bloating and pain, avoid ibuprofen.  -CT abd/pelvis to be done if pain does not get better in 2 days.  - POCT Urinalysis  -urine culture    2. Need for vaccination  Given in clinic today as discussed with the patient   - Pneumococcal Conjugate Vaccine 20-Valent (6 wks+)       I spent a total of 33 minutes with record review, exam, communication with the patient, communication with other providers, and documentation of this encounter.    Return if symptoms worsen or fail to improve.  Follow up with  as scheduled in 2 months.    Please note that this dictation  was created using voice recognition software. I have made every reasonable attempt to correct obvious errors, but I expect that there are errors of grammar and possibly content that I did not discover before finalizing the note.

## 2023-11-05 LAB
BACTERIA UR CULT: NORMAL
SIGNIFICANT IND 70042: NORMAL
SITE SITE: NORMAL
SOURCE SOURCE: NORMAL

## 2023-11-06 ENCOUNTER — TELEPHONE (OUTPATIENT)
Dept: MEDICAL GROUP | Facility: PHYSICIAN GROUP | Age: 68
End: 2023-11-06
Payer: MEDICARE

## 2023-11-07 DIAGNOSIS — R10.84 GENERALIZED ABDOMINAL PAIN: ICD-10-CM

## 2023-11-07 NOTE — TELEPHONE ENCOUNTER
1. Caller Name: Lidia Dior                          Call Back Number: [phone] (home)         How would the patient prefer to be contacted with a response: Phone call OK to leave a detailed message    Patient needed labs ordered before CT with contrast

## 2023-11-09 ENCOUNTER — HOSPITAL ENCOUNTER (OUTPATIENT)
Dept: LAB | Facility: MEDICAL CENTER | Age: 68
End: 2023-11-09
Attending: STUDENT IN AN ORGANIZED HEALTH CARE EDUCATION/TRAINING PROGRAM
Payer: MEDICARE

## 2023-11-09 DIAGNOSIS — R10.84 GENERALIZED ABDOMINAL PAIN: ICD-10-CM

## 2023-11-09 LAB
ANION GAP SERPL CALC-SCNC: 8 MMOL/L (ref 7–16)
BUN SERPL-MCNC: 12 MG/DL (ref 8–22)
CALCIUM SERPL-MCNC: 9.4 MG/DL (ref 8.4–10.2)
CHLORIDE SERPL-SCNC: 103 MMOL/L (ref 96–112)
CO2 SERPL-SCNC: 29 MMOL/L (ref 20–33)
CREAT SERPL-MCNC: 0.73 MG/DL (ref 0.5–1.4)
GFR SERPLBLD CREATININE-BSD FMLA CKD-EPI: 90 ML/MIN/1.73 M 2
GLUCOSE SERPL-MCNC: 92 MG/DL (ref 65–99)
POTASSIUM SERPL-SCNC: 4.7 MMOL/L (ref 3.6–5.5)
SODIUM SERPL-SCNC: 140 MMOL/L (ref 135–145)

## 2023-11-09 PROCEDURE — 80048 BASIC METABOLIC PNL TOTAL CA: CPT

## 2023-11-09 PROCEDURE — 36415 COLL VENOUS BLD VENIPUNCTURE: CPT

## 2023-11-14 ENCOUNTER — HOSPITAL ENCOUNTER (OUTPATIENT)
Dept: RADIOLOGY | Facility: MEDICAL CENTER | Age: 68
End: 2023-11-14
Attending: STUDENT IN AN ORGANIZED HEALTH CARE EDUCATION/TRAINING PROGRAM
Payer: MEDICARE

## 2023-11-14 DIAGNOSIS — R10.84 GENERALIZED ABDOMINAL PAIN: ICD-10-CM

## 2023-11-14 DIAGNOSIS — N28.9 KIDNEY LESION, NATIVE, LEFT: ICD-10-CM

## 2023-11-14 PROCEDURE — 74177 CT ABD & PELVIS W/CONTRAST: CPT

## 2023-11-14 PROCEDURE — 700117 HCHG RX CONTRAST REV CODE 255: Performed by: STUDENT IN AN ORGANIZED HEALTH CARE EDUCATION/TRAINING PROGRAM

## 2023-11-14 RX ADMIN — IOHEXOL 100 ML: 350 INJECTION, SOLUTION INTRAVENOUS at 08:10

## 2023-11-20 ENCOUNTER — OFFICE VISIT (OUTPATIENT)
Dept: MEDICAL GROUP | Facility: PHYSICIAN GROUP | Age: 68
End: 2023-11-20
Payer: MEDICARE

## 2023-11-20 VITALS
SYSTOLIC BLOOD PRESSURE: 108 MMHG | WEIGHT: 145.3 LBS | OXYGEN SATURATION: 97 % | HEART RATE: 64 BPM | TEMPERATURE: 97 F | HEIGHT: 66 IN | DIASTOLIC BLOOD PRESSURE: 70 MMHG | BODY MASS INDEX: 23.35 KG/M2 | RESPIRATION RATE: 12 BRPM

## 2023-11-20 DIAGNOSIS — E03.9 HYPOTHYROIDISM, UNSPECIFIED TYPE: ICD-10-CM

## 2023-11-20 DIAGNOSIS — K59.01 SLOW TRANSIT CONSTIPATION: ICD-10-CM

## 2023-11-20 DIAGNOSIS — Z00.00 ENCOUNTER FOR SUBSEQUENT ANNUAL WELLNESS VISIT (AWV) IN MEDICARE PATIENT: Primary | ICD-10-CM

## 2023-11-20 DIAGNOSIS — F32.5 MAJOR DEPRESSIVE DISORDER WITH SINGLE EPISODE, IN FULL REMISSION (HCC): ICD-10-CM

## 2023-11-20 DIAGNOSIS — N28.1 KIDNEY CYSTS: ICD-10-CM

## 2023-11-20 PROCEDURE — 3078F DIAST BP <80 MM HG: CPT | Performed by: INTERNAL MEDICINE

## 2023-11-20 PROCEDURE — 3074F SYST BP LT 130 MM HG: CPT | Performed by: INTERNAL MEDICINE

## 2023-11-20 PROCEDURE — G0439 PPPS, SUBSEQ VISIT: HCPCS | Performed by: INTERNAL MEDICINE

## 2023-11-20 ASSESSMENT — ENCOUNTER SYMPTOMS: GENERAL WELL-BEING: EXCELLENT

## 2023-11-20 ASSESSMENT — PATIENT HEALTH QUESTIONNAIRE - PHQ9: CLINICAL INTERPRETATION OF PHQ2 SCORE: 0

## 2023-11-20 ASSESSMENT — FIBROSIS 4 INDEX: FIB4 SCORE: 1.07

## 2023-11-20 ASSESSMENT — ACTIVITIES OF DAILY LIVING (ADL): BATHING_REQUIRES_ASSISTANCE: 0

## 2023-11-20 NOTE — ASSESSMENT & PLAN NOTE
Chronic ongoing problem, feels well on current dose of [place] thyroid, no overt symptoms of over or under treatment. Continue to follow thyroid labs regularly, her thyroid ultrasounds remained stable, she is to remaining in June 2024 in June 2026 and if she continues to be stable then she can stop with surveillance.  Continue [place] Thyroid 90 mg daily.

## 2023-11-20 NOTE — PROGRESS NOTES
Chief Complaint   Patient presents with    Annual Wellness Visit       HPI:  Lidia Dior is a 68 y.o. here for Medicare Annual Wellness Visit     Problem   Major Depressive Disorder With Single Episode, in Full Remission (Hcc)    She developed major changes in her mood and started seeing mental health expert.  She was diagnosed with depression and recommended to start on escitalopram 10 mg daily, she started this about 4 to 5 weeks ago and notes tremendous improvement in her mood.  No appreciable side effects.  She plans to continue the medicine for 6 to 12 months and then evaluate for appropriateness to titrate off.     Slow Transit Constipation    She has significant stool burden on recent CT imaging ordered by my colleague for evaluation of fullness and left lower quadrant abdominal pain.  She was concerned about ovarian pathology which fortunately returned as negative on imaging.  She is surprised that she has amount of stool she does since she has a bowel movement daily but notes in the past she has felt improved after doing colonoscopy prep and is agreeable to trying a bowel cleanse.     Kidney Cysts    Longstanding history of renal cyst, one 8 mm hypodense cyst which cannot be fully characterized on CT imaging, radiologist recommended follow-up ultrasound imaging.     Hypothyroidism     Latest Reference Range & Units 06/29/23 10:13   TSH 0.380 - 5.330 uIU/mL 0.240 (L)   Free T-4 0.93 - 1.70 ng/dL 1.09   T3,Free 2.00 - 4.40 pg/mL 5.05 (H)     She reports diagnosis of hypothyroidism in 2008, she has been maintained on [place] Thyroid 90 mg daily with good results. She was also found to have a thyroid nodule on US in June 2021, stable on follow up in June 2022 and June 2023. Recheck in 2024 and 2026 and if stable can stop surveillance.    Current regimen: [place] Thyroid 90 mg daily           Patient Active Problem List    Diagnosis Date Noted    Major depressive disorder with single episode, in full  remission (HCC) 11/20/2023    Slow transit constipation 11/20/2023    Kidney cysts 11/20/2023    Sun-damaged skin 07/03/2023    Pain of left heel 01/09/2023    Plantar fasciitis of left foot 11/22/2022    Skin lesion 07/07/2022    Trigger ring finger of right hand 07/07/2022    Lump of left wrist 07/07/2022    Elevated alkaline phosphatase level 07/07/2022    Palpitations 03/08/2022    Allergic rhinitis 03/08/2022    Right hip pain 10/13/2021    Thyroid nodule 10/13/2021    Microcalcification of left breast on mammogram 10/13/2021    Advance care planning 10/13/2021    Dyslipidemia 10/03/2018    Healthcare maintenance 07/16/2018    Hypothyroidism 07/16/2018    Vitamin D deficiency 07/16/2018    Postsurgical menopause 07/16/2018       Current Outpatient Medications   Medication Sig Dispense Refill    escitalopram (LEXAPRO) 10 MG Tab Take 10 mg by mouth every day.      ARMOUR THYROID 90 MG Tab TAKE 1 TABLET BY MOUTH EVERY DAY IN THE MORNING ON AN EMPTY STOMACH 100 Tablet 3    Probiotic Product (PROBIOTIC PO)       Multiple Vitamins-Minerals (HAIR SKIN AND NAILS FORMULA PO)       Cholecalciferol (VITAMIN D-3) 5000 units Tab Take  by mouth.      B Complex Vitamins (B COMPLEX PO) Take  by mouth.      CALCIUM-MAGNESIUM-ZINC PO Take  by mouth.       No current facility-administered medications for this visit.          Current supplements as per medication list.     Allergies: Aspirin and Latex    Current social contact/activities: go with friends for lunch or coffee, movies with  and going out on walks     She  reports that she has never smoked. She has never used smokeless tobacco. She reports current alcohol use. She reports that she does not use drugs.  Counseling given: Not Answered      ROS:    Gait: Uses no assistive device  Ostomy: No  Other tubes: No  Amputations: No  Chronic oxygen use: No  Last eye exam: January 2023  Wears hearing aids: No   : Denies any urinary leakage during the last 6  months    Screenin  Depression Screening  Little interest or pleasure in doing things?  0 - not at all  Feeling down, depressed , or hopeless? 0 - not at all  Patient Health Questionnaire Score: 0     If depressive symptoms identified deferred to follow up visit unless specifically addressed in assessment and plan.    Interpretation of PHQ-9 Total Score   Score Severity   1-4 No Depression   5-9 Mild Depression   10-14 Moderate Depression   15-19 Moderately Severe Depression   20-27 Severe Depression    Screening for Cognitive Impairment  Do you or any of your friends or family members have any concern about your memory? No  Three Minute Recall (Banana, Sunrise, Chair) 3/3    Jose Roberto clock face with all 12 numbers and set the hands to show 20 past 8.  Yes    Cognitive concerns identified deferred for follow up unless specifically addressed in assessment and plan.    Fall Risk Assessment  Has the patient had two or more falls in the last year or any fall with injury in the last year?  No    Safety Assessment  Do you always wear your seatbelt?  Yes  Any changes to home needed to function safely? No  Difficulty hearing.  No  Patient counseled about all safety risks that were identified.    Functional Assessment ADLs  Are there any barriers preventing you from cooking for yourself or meeting nutritional needs?  No.    Are there any barriers preventing you from driving safely or obtaining transportation?  No.    Are there any barriers preventing you from using a telephone or calling for help?  No    Are there any barriers preventing you from shopping?  No.    Are there any barriers preventing you from taking care of your own finances?  No    Are there any barriers preventing you from managing your medications?  No    Are there any barriers preventing you from showering, bathing or dressing yourself? No    Are there any barriers preventing you from doing housework or laundry? No  Are there any barriers preventing you  from using the toilet?No  Are you currently engaging in any exercise or physical activity?  Yes.      Self-Assessment of Health  What is your perception of your health? Excellent  Do you sleep more than six hours a night? Yes  In the past 7 days, how much did pain keep you from doing your normal work? None  Do you spend quality time with family or friends (virtually or in person)? Yes  Do you usually eat a heart healthy diet that constists of a variety of fruits, vegetables, whole grains and fiber? Yes  Do you eat foods high in fat and/or Fast Food more than three times per week? No    Advance Care Planning  Do you have an Advance Directive, Living Will, Durable Power of , or POLST? Yes  Advance Directive       is on file      Health Maintenance Summary            Overdue - COVID-19 Vaccine (5 - Pfizer series) Overdue since 2/5/2023      10/05/2022  Imm Admin: PFIZER BIVALENT SARS-COV-2 VACCINE (12+)    10/26/2021  Imm Admin: PFIZER PURPLE CAP SARS-COV-2 VACCINATION (12+)    04/22/2021  Imm Admin: PFIZER PURPLE CAP SARS-COV-2 VACCINATION (12+)    03/31/2021  Imm Admin: PFIZER PURPLE CAP SARS-COV-2 VACCINATION (12+)              Mammogram (Yearly) Next due on 6/30/2024 06/30/2023  MA-SCREENING MAMMO BILAT W/TOMOSYNTHESIS W/CAD    05/09/2022  MA-DIAGNOSTIC MAMMO LEFT W/TOMOSYNTHESIS W/CAD    10/26/2021  MA-DIAGNOSTIC MAMMO BILAT W/TOMOSYNTHESIS W/CAD    10/13/2020  MA-DIAGNOSTIC MAMMO LEFT W/O CAD    10/07/2020  MA-SCREENING MAMMO BILAT W/TOMOSYNTHESIS W/CAD    Only the first 5 history entries have been loaded, but more history exists.              Annual Wellness Visit (Every 366 Days) Next due on 11/20/2024 11/20/2023  Level of Service: ANNUAL WELLNESS VISIT-INCLUDES PPPS SUBSEQUE*    03/08/2022  Level of Service: MA ANNUAL WELLNESS VISIT-INCLUDES PPPS SUBSEQUE*              Bone Density Scan (Every 5 Years) Next due on 8/24/2028 08/24/2023  DS-BONE DENSITY STUDY (DEXA)    08/15/2018  DS-BONE  DENSITY STUDY (DEXA)    10/15/2015  DS-BONE DENSITY STUDY (DEXA)              IMM DTaP/Tdap/Td Vaccine (2 - Td or Tdap) Next due on 10/2/2028      10/02/2018  Imm Admin: Tdap Vaccine              Colorectal Cancer Screening (Colonoscopy - Every 10 Years) Tentatively due on 9/24/2030 09/24/2020  REFERRAL TO GI FOR COLONOSCOPY              Zoster (Shingles) Vaccines (Series Information) Completed      12/27/2020  Imm Admin: Zoster Vaccine Recombinant (RZV) (SHINGRIX)    08/31/2020  Imm Admin: Zoster Vaccine Recombinant (RZV) (SHINGRIX)    07/16/2011  Imm Admin: Zoster Vaccine Live (ZVL) (Zostavax) - HISTORICAL DATA              Hepatitis C Screening  Tentatively Complete      02/11/2021  Hepatitis C Antibody component of HEP C VIRUS ANTIBODY              Influenza Vaccine (Series Information) Completed      09/25/2023  Imm Admin: Influenza Vaccine, Quadrivalent, Adjuvanted (Pf)    10/05/2022  Imm Admin: Influenza, Unspecified - HISTORICAL DATA    10/13/2021  Imm Admin: Influenza Vaccine Adult HD    08/31/2020  Imm Admin: Influenza Vaccine Quad Inj (Pf)    10/21/2019  Imm Admin: Influenza Vaccine Quad Inj (Pf)    Only the first 5 history entries have been loaded, but more history exists.              Pneumococcal Vaccine: 65+ Years (Series Information) Completed      11/03/2023  Imm Admin: Pneumococcal Conjugate Vaccine (PCV20)    03/08/2022  Imm Admin: Pneumococcal polysaccharide vaccine (PPSV-23)              Hepatitis A Vaccine (Hep A) (Series Information) Aged Out      No completion history exists for this topic.              Hepatitis B Vaccine (Hep B) (Series Information) Aged Out      No completion history exists for this topic.              HPV Vaccines (Series Information) Aged Out      No completion history exists for this topic.              Polio Vaccine (Inactivated Polio) (Series Information) Aged Out      No completion history exists for this topic.              Meningococcal Immunization (Series  Information) Aged Out      No completion history exists for this topic.              Discontinued - Cervical Cancer Screening  Discontinued        Frequency changed to Never automatically (Topic No Longer Applies)    06/08/2021  THINPREP PAP WITH HPV    06/08/2021  Pathology Gynecology Specimen    03/30/2011  PAP IG (IMAGE GUIDED)                    Patient Care Team:  Lesa Garcia D.O. as PCP - General (Internal Medicine)        Social History     Tobacco Use    Smoking status: Never    Smokeless tobacco: Never   Vaping Use    Vaping Use: Never used   Substance Use Topics    Alcohol use: Yes     Alcohol/week: 0.0 - 0.6 oz     Comment: glass of wine 1-2x /wk    Drug use: No     Family History   Problem Relation Age of Onset    Diabetes Mother         Adult onset, on insulin    Arthritis Mother         RA    Heart Disease Father         CHF    Diabetes Sister         borderline    Thyroid Sister         hypothyroid    Diabetes Sister         borderline    Diabetes Sister     Diabetes Sister     Diabetes Sister     Other Son         AV valve repair - possibly genetic    Hypertension Son      She  has a past medical history of Abnormal mammogram (11/3/2021), B12 deficiency (7/16/2018), COVID-19 virus infection (5/2/2023), Current long-term use of postmenopausal hormone replacement therapy (7/16/2018), Dyslipidemia, Elevated fasting blood sugar (10/13/2021), Moderate episode of recurrent major depressive disorder (HCC) (1/10/2023), Other insomnia (1/10/2023), Ovarian cyst (10/2005), Renal cyst (9/215), Renal cyst (10/2008), and Thrombocytopenia (HCC) (3/8/2022).   Past Surgical History:   Procedure Laterality Date    OTHER SURGICAL PROCEDURE Left 10/09/2006    Varicose veins procedure in office    MARKUS BY LAPAROSCOPY  07/31/2003    due to cholecystitis    OTHER ORTHOPEDIC SURGERY Right 01/26/2001    slipped on ice. Pins in wrist    HYSTERECTOMY LAPAROSCOPY  10/1995    partial (still has ovaries). Hx of  "fibroids/menorrhagia       Exam:   /70 (BP Location: Left arm, Patient Position: Sitting)   Pulse 64   Temp 36.1 °C (97 °F) (Temporal)   Resp 12   Ht 1.676 m (5' 6\")   Wt 65.9 kg (145 lb 4.8 oz)   SpO2 97%  Body mass index is 23.45 kg/m².    Hearing good.    Dentition good  Alert, oriented in no acute distress.  Eye contact is good, speech goal directed, affect calm  Mildly TTP in left upper and RLQ.    Assessment and Plan. The following treatment and monitoring plan is recommended:    Problem List Items Addressed This Visit       Hypothyroidism     Chronic ongoing problem, feels well on current dose of [place] thyroid, no overt symptoms of over or under treatment. Continue to follow thyroid labs regularly, her thyroid ultrasounds remained stable, she is to remaining in June 2024 in June 2026 and if she continues to be stable then she can stop with surveillance.  Continue [place] Thyroid 90 mg daily.           Kidney cysts     Chronic ongoing problem, she will drop off records of last kidney ultrasound so we can compare, she is scheduled for renal ultrasound in about 2 weeks to follow-up on the 8 mm hypodense lesion for further characterization.         Major depressive disorder with single episode, in full remission (HCC)     New problem, improving on escitalopram 10 mg daily, no noted side effects, she feels like she is not a great spot now and we will plan to continue on current medication and consider doing off in 6 to 12 months if she remains stable.         Slow transit constipation     New issue found on imaging with associated abdominal fullness and discomfort, discussed she does have diverticulosis of the sigmoid colon and with ongoing constipation will be at high risk of developing diverticulitis.  She will try a bowel cleanse with half a bottle magnesium citrate x2 to see if she can remove some of the stool burden, we have follow-up in about 6 weeks but she will keep me updated in the interim.   "        Other Visit Diagnoses       Encounter for subsequent annual wellness visit (AWV) in Medicare patient    -  Primary              Services suggested: No services needed at this time  Health Care Screening: Age-appropriate preventive services recommended by USPTF and ACIP covered by Medicare were discussed today. Services ordered if indicated and agreed upon by the patient.  Referrals offered: Community-based lifestyle interventions to reduce health risks and promote self-management and wellness, fall prevention, nutrition, physical activity, tobacco-use cessation, weight loss, and mental health services as per orders if indicated.    Discussion today about general wellness and lifestyle habits:    Prevent falls and reduce trip hazards; Cautioned about securing or removing rugs.  Have a working fire alarm and carbon monoxide detector;   Engage in regular physical activity and social activities     Follow-up: Return if symptoms worsen or fail to improve.    I spent a total of 34 minutes with record review, exam, communication with the patient, communication with other providers, and documentation of this encounter.    eLsa Garcia, DO  Geriatric and Internal Medicine  [facility]

## 2023-11-20 NOTE — ASSESSMENT & PLAN NOTE
Chronic ongoing problem, she will drop off records of last kidney ultrasound so we can compare, she is scheduled for renal ultrasound in about 2 weeks to follow-up on the 8 mm hypodense lesion for further characterization.

## 2023-11-20 NOTE — ASSESSMENT & PLAN NOTE
New issue found on imaging with associated abdominal fullness and discomfort, discussed she does have diverticulosis of the sigmoid colon and with ongoing constipation will be at high risk of developing diverticulitis.  She will try a bowel cleanse with half a bottle magnesium citrate x2 to see if she can remove some of the stool burden, we have follow-up in about 6 weeks but she will keep me updated in the interim.

## 2023-11-20 NOTE — ASSESSMENT & PLAN NOTE
New problem, improving on escitalopram 10 mg daily, no noted side effects, she feels like she is not a great spot now and we will plan to continue on current medication and consider doing off in 6 to 12 months if she remains stable.

## 2023-12-01 ENCOUNTER — HOSPITAL ENCOUNTER (OUTPATIENT)
Dept: RADIOLOGY | Facility: MEDICAL CENTER | Age: 68
End: 2023-12-01
Attending: STUDENT IN AN ORGANIZED HEALTH CARE EDUCATION/TRAINING PROGRAM
Payer: MEDICARE

## 2023-12-01 DIAGNOSIS — N28.9 KIDNEY LESION, NATIVE, LEFT: ICD-10-CM

## 2023-12-01 PROCEDURE — 76775 US EXAM ABDO BACK WALL LIM: CPT

## 2024-01-03 ENCOUNTER — OFFICE VISIT (OUTPATIENT)
Dept: MEDICAL GROUP | Facility: PHYSICIAN GROUP | Age: 69
End: 2024-01-03
Payer: MEDICARE

## 2024-01-03 VITALS
OXYGEN SATURATION: 97 % | RESPIRATION RATE: 14 BRPM | DIASTOLIC BLOOD PRESSURE: 68 MMHG | HEART RATE: 64 BPM | BODY MASS INDEX: 23.33 KG/M2 | SYSTOLIC BLOOD PRESSURE: 126 MMHG | HEIGHT: 66 IN | WEIGHT: 145.2 LBS | TEMPERATURE: 97.5 F

## 2024-01-03 DIAGNOSIS — R05.1 ACUTE COUGH: ICD-10-CM

## 2024-01-03 DIAGNOSIS — U07.1 COVID-19 VIRUS INFECTION: ICD-10-CM

## 2024-01-03 DIAGNOSIS — F32.5 MAJOR DEPRESSIVE DISORDER WITH SINGLE EPISODE, IN FULL REMISSION (HCC): ICD-10-CM

## 2024-01-03 DIAGNOSIS — E03.9 HYPOTHYROIDISM, UNSPECIFIED TYPE: ICD-10-CM

## 2024-01-03 DIAGNOSIS — E78.5 DYSLIPIDEMIA: ICD-10-CM

## 2024-01-03 DIAGNOSIS — N28.1 KIDNEY CYSTS: ICD-10-CM

## 2024-01-03 LAB
FLUAV RNA SPEC QL NAA+PROBE: NEGATIVE
FLUBV RNA SPEC QL NAA+PROBE: NEGATIVE
RSV RNA SPEC QL NAA+PROBE: NEGATIVE
SARS-COV-2 RNA RESP QL NAA+PROBE: POSITIVE

## 2024-01-03 PROCEDURE — 0241U POCT CEPHEID COV-2, FLU A/B, RSV - PCR: CPT | Performed by: INTERNAL MEDICINE

## 2024-01-03 PROCEDURE — 3078F DIAST BP <80 MM HG: CPT | Performed by: INTERNAL MEDICINE

## 2024-01-03 PROCEDURE — 99214 OFFICE O/P EST MOD 30 MIN: CPT | Performed by: INTERNAL MEDICINE

## 2024-01-03 PROCEDURE — 3074F SYST BP LT 130 MM HG: CPT | Performed by: INTERNAL MEDICINE

## 2024-01-03 ASSESSMENT — PATIENT HEALTH QUESTIONNAIRE - PHQ9
SUM OF ALL RESPONSES TO PHQ QUESTIONS 1-9: 0
9. THOUGHTS THAT YOU WOULD BE BETTER OFF DEAD, OR OF HURTING YOURSELF: NOT AT ALL
2. FEELING DOWN, DEPRESSED, IRRITABLE, OR HOPELESS: NOT AT ALL
5. POOR APPETITE OR OVEREATING: NOT AT ALL
8. MOVING OR SPEAKING SO SLOWLY THAT OTHER PEOPLE COULD HAVE NOTICED. OR THE OPPOSITE, BEING SO FIGETY OR RESTLESS THAT YOU HAVE BEEN MOVING AROUND A LOT MORE THAN USUAL: NOT AT ALL
1. LITTLE INTEREST OR PLEASURE IN DOING THINGS: NOT AT ALL
7. TROUBLE CONCENTRATING ON THINGS, SUCH AS READING THE NEWSPAPER OR WATCHING TELEVISION: NOT AT ALL
4. FEELING TIRED OR HAVING LITTLE ENERGY: NOT AT ALL
6. FEELING BAD ABOUT YOURSELF - OR THAT YOU ARE A FAILURE OR HAVE LET YOURSELF OR YOUR FAMILY DOWN: NOT AL ALL
SUM OF ALL RESPONSES TO PHQ9 QUESTIONS 1 AND 2: 0
3. TROUBLE FALLING OR STAYING ASLEEP OR SLEEPING TOO MUCH: NOT AT ALL

## 2024-01-03 ASSESSMENT — FIBROSIS 4 INDEX: FIB4 SCORE: 1.07

## 2024-01-03 NOTE — ASSESSMENT & PLAN NOTE
Chronic improved problem, mood is doing really well on current regimen, she like to continue escitalopram 10 mg daily.

## 2024-01-03 NOTE — PROGRESS NOTES
Subjective:   Chief Complaint/History of Present Illness:  Lidia Dior is a 68 y.o. female established patient who presents today to discuss medical problems as listed below. Lidia is unaccompanied for today's visit.    Problem   Acute Cough    Her  had COVID 10 days ago and was symptomatic, she tried to stay separate from him.  This morning she woke up with puffy eyes, sinus congestion, and a tickle in the throat.  Vital signs are reassuring.     Major Depressive Disorder With Single Episode, in Full Remission (Hcc)    Depression Screening    Little interest or pleasure in doing things?   Not at all  Feeling down, depressed , or hopeless?  Not at all  Trouble falling or staying asleep, or sleeping too much?   Not at all  Feeling tired or having little energy?   Not at all  Poor appetite or overeating?   Not at all  Feeling bad about yourself - or that you are a failure or have let yourself or your family down?  Not al all  Trouble concentrating on things, such as reading the newspaper or watching television?  Not at all  Moving or speaking so slowly that other people could have noticed.  Or the opposite - being so fidgety or restless that you have been moving around a lot more than usual?   Not at all  Thoughts that you would be better off dead, or of hurting yourself?   Not at all  Patient Health Questionnaire Score:  0      She developed major changes in her mood and started seeing mental health expert.  She was diagnosed with depression and recommended to start on escitalopram 10 mg daily, she started this about 4 to 5 weeks ago and notes tremendous improvement in her mood.  No appreciable side effects.  She plans to continue the medicine for 6 to 12 months and then evaluate for appropriateness to titrate off.    Current regimen: Escitalopram 10 mg daily     Kidney Cysts    Longstanding history of renal cyst, one 8 mm hypodense cyst which cannot be fully characterized on CT imaging, radiologist  recommended follow-up ultrasound imaging.    Ultrasound shows likely complicated cyst, no additional imaging needed at this time.  She found previous reports from early 2000's which confirmed this finding.     Dyslipidemia     Latest Reference Range & Units 06/29/23 10:13   Cholesterol,Tot 100 - 199 mg/dL 167   Triglycerides 0 - 149 mg/dL 128   HDL >=40 mg/dL 51   LDL <100 mg/dL 90     The 10-year ASCVD risk score (Soumya HALE, et al., 2019) is: 4.8%    She was found to have a history of elevated cholesterol.  She is very active.  She has been able to bring this down with her healthy lifestyle.       Hypothyroidism     Latest Reference Range & Units 06/29/23 10:13   TSH 0.380 - 5.330 uIU/mL 0.240 (L)   Free T-4 0.93 - 1.70 ng/dL 1.09   T3,Free 2.00 - 4.40 pg/mL 5.05 (H)     She reports diagnosis of hypothyroidism in 2008, she has been maintained on [place] Thyroid 90 mg daily with good results. She was also found to have a thyroid nodule on US in June 2021, stable on follow up in June 2022 and June 2023. Recheck in 2024 and 2026 and if stable can stop surveillance.    Current regimen: [place] Thyroid 90 mg daily          Current Medications:  Current Outpatient Medications Ordered in Epic   Medication Sig Dispense Refill    escitalopram (LEXAPRO) 10 MG Tab Take 10 mg by mouth every day.      ARMOUR THYROID 90 MG Tab TAKE 1 TABLET BY MOUTH EVERY DAY IN THE MORNING ON AN EMPTY STOMACH 100 Tablet 3    Probiotic Product (PROBIOTIC PO)       Multiple Vitamins-Minerals (HAIR SKIN AND NAILS FORMULA PO)       Cholecalciferol (VITAMIN D-3) 5000 units Tab Take  by mouth.      B Complex Vitamins (B COMPLEX PO) Take  by mouth.      CALCIUM-MAGNESIUM-ZINC PO Take  by mouth.       No current Epic-ordered facility-administered medications on file.          Objective:   Physical Exam:    Vitals: /68 (BP Location: Left arm, Patient Position: Sitting, BP Cuff Size: Adult)   Pulse 64   Temp 36.4 °C (97.5 °F) (Temporal)   Resp 14   " Ht 1.676 m (5' 6\")   Wt 65.9 kg (145 lb 3.2 oz)   SpO2 97%    BMI: Body mass index is 23.44 kg/m².  Physical Exam  Constitutional:       General: She is not in acute distress.     Appearance: Normal appearance. She is not ill-appearing.   HENT:      Right Ear: External ear normal.      Left Ear: External ear normal.   Eyes:      General: No scleral icterus.     Conjunctiva/sclera: Conjunctivae normal.      Comments: Puffy periorbital tissue   Cardiovascular:      Rate and Rhythm: Normal rate and regular rhythm.      Pulses: Normal pulses.   Pulmonary:      Effort: Pulmonary effort is normal. No respiratory distress.      Breath sounds: No wheezing or rhonchi.   Abdominal:      General: Bowel sounds are normal.      Palpations: Abdomen is soft.   Musculoskeletal:      Right lower leg: No edema.      Left lower leg: No edema.   Lymphadenopathy:      Cervical: No cervical adenopathy.   Skin:     General: Skin is warm and dry.      Findings: No rash.   Neurological:      Gait: Gait normal.   Psychiatric:         Mood and Affect: Mood normal.         Behavior: Behavior normal.         Thought Content: Thought content normal.         Judgment: Judgment normal.          Assessment and Plan:   Lidia is a 68 y.o. female with the following:  Problem List Items Addressed This Visit       Acute cough     New problem, due to recent COVID exposure with her  who was diagnosed 10 days ago and treated with Paxlovid will check Cepheid to ensure this is not really COVID and otherwise if it develops into her typical sinus infection can proceed with antibiotics if her symptoms worsen.         Relevant Orders    POCT CEPHEID COV-2, FLU A/B, RSV - PCR    Dyslipidemia     Chronic stable problem, continue periodic assessment to ensure stability, check annual lab work before follow up in 6 months.         Relevant Orders    FREE THYROXINE    TSH    VITAMIN B12    VITAMIN D,25 HYDROXY (DEFICIENCY)    Lipid Profile    Comp " Metabolic Panel    CBC WITH DIFFERENTIAL    Hypothyroidism     Neck ongoing problem, continues [place] Thyroid 90 mg daily.  Will be due for repeat thyroid ultrasound in June 2024.         Kidney cysts     Chronic ongoing problem, seen on CT imaging for evaluation of abdominal discomfort which has since resolved.  Renal ultrasound showed complicated cyst.  She found previous imaging of an MRI back around 2006 which also confirm this finding.  Asymptomatic.  No additional workup needed at this time.         Major depressive disorder with single episode, in full remission (HCC)     Chronic improved problem, mood is doing really well on current regimen, she like to continue escitalopram 10 mg daily.               RTC: Return in about 6 months (around 7/3/2024).    I spent a total of 34 minutes with record review, exam, communication with the patient, communication with other providers, and documentation of this encounter.    PLEASE NOTE: This dictation was created using voice recognition software. I have made every reasonable attempt to correct obvious errors, but I expect that there are errors of grammar and possibly content that I did not discover before finalizing the note.      Lesa Garcia, DO  Geriatric and Internal Medicine  [facility]

## 2024-01-03 NOTE — ASSESSMENT & PLAN NOTE
Chronic ongoing problem, seen on CT imaging for evaluation of abdominal discomfort which has since resolved.  Renal ultrasound showed complicated cyst.  She found previous imaging of an MRI back around 2006 which also confirm this finding.  Asymptomatic.  No additional workup needed at this time.

## 2024-01-03 NOTE — ASSESSMENT & PLAN NOTE
Neck ongoing problem, continues [place] Thyroid 90 mg daily.  Will be due for repeat thyroid ultrasound in June 2024.

## 2024-01-03 NOTE — ASSESSMENT & PLAN NOTE
New problem, due to recent COVID exposure with her  who was diagnosed 10 days ago and treated with Paxlovid will check Cepheid to ensure this is not really COVID and otherwise if it develops into her typical sinus infection can proceed with antibiotics if her symptoms worsen.

## 2024-01-03 NOTE — ASSESSMENT & PLAN NOTE
Chronic stable problem, continue periodic assessment to ensure stability, check annual lab work before follow up in 6 months.

## 2024-02-16 ENCOUNTER — TELEPHONE (OUTPATIENT)
Dept: HEALTH INFORMATION MANAGEMENT | Facility: OTHER | Age: 69
End: 2024-02-16
Payer: MEDICARE

## 2024-03-14 ENCOUNTER — OFFICE VISIT (OUTPATIENT)
Dept: MEDICAL GROUP | Facility: PHYSICIAN GROUP | Age: 69
End: 2024-03-14
Payer: MEDICARE

## 2024-03-14 VITALS
DIASTOLIC BLOOD PRESSURE: 60 MMHG | SYSTOLIC BLOOD PRESSURE: 100 MMHG | WEIGHT: 145 LBS | OXYGEN SATURATION: 94 % | RESPIRATION RATE: 12 BRPM | HEART RATE: 68 BPM | BODY MASS INDEX: 23.3 KG/M2 | HEIGHT: 66 IN | TEMPERATURE: 96.5 F

## 2024-03-14 DIAGNOSIS — M25.551 RIGHT HIP PAIN: ICD-10-CM

## 2024-03-14 DIAGNOSIS — F32.5 MAJOR DEPRESSIVE DISORDER WITH SINGLE EPISODE, IN FULL REMISSION (HCC): ICD-10-CM

## 2024-03-14 DIAGNOSIS — E03.9 HYPOTHYROIDISM, UNSPECIFIED TYPE: ICD-10-CM

## 2024-03-14 DIAGNOSIS — E78.5 DYSLIPIDEMIA: ICD-10-CM

## 2024-03-14 PROCEDURE — 3078F DIAST BP <80 MM HG: CPT | Performed by: INTERNAL MEDICINE

## 2024-03-14 PROCEDURE — 3074F SYST BP LT 130 MM HG: CPT | Performed by: INTERNAL MEDICINE

## 2024-03-14 PROCEDURE — 99214 OFFICE O/P EST MOD 30 MIN: CPT | Performed by: INTERNAL MEDICINE

## 2024-03-14 RX ORDER — COVID-19 VACCINE, MRNA 0.04 MG/.418ML
INJECTION, SUSPENSION INTRAMUSCULAR
COMMUNITY
Start: 2023-12-27

## 2024-03-14 RX ORDER — TRIAMCINOLONE ACETONIDE 1 MG/G
1 CREAM TOPICAL 2 TIMES DAILY
COMMUNITY
Start: 2024-03-13

## 2024-03-14 RX ORDER — GABAPENTIN 100 MG/1
100-300 CAPSULE ORAL NIGHTLY
Qty: 30 CAPSULE | Refills: 2 | Status: SHIPPED | OUTPATIENT
Start: 2024-03-14

## 2024-03-14 ASSESSMENT — FIBROSIS 4 INDEX: FIB4 SCORE: 1.07

## 2024-03-14 NOTE — PROGRESS NOTES
Subjective:   Chief Complaint/History of Present Illness:  Lidia Dior is a 68 y.o. female established patient who presents today to discuss medical problems as listed below. Lidia is unaccompanied for today's visit.    Problem   Major Depressive Disorder With Single Episode, in Full Remission (Hcc)      She developed major changes in her mood and started seeing mental health expert.  She was diagnosed with depression and recommended to start on escitalopram 10 mg daily, she started this about 4 to 5 weeks ago and notes tremendous improvement in her mood.  No appreciable side effects.  She plans to continue the medicine for 6 to 12 months and then evaluate for appropriateness to titrate off.    Current regimen: Escitalopram 10 mg daily     Right Hip Pain    She reports approximately 1 year of right hip pain.  Is at the anterior aspect.  No radiation to the groin.  No sciatica symptoms.  No bursitis description on the lateral aspect.  Comes and goes.  She has been doing stretches following a program on [employer].  No prior imaging completed.     Dyslipidemia     Latest Reference Range & Units 06/29/23 10:13   Cholesterol,Tot 100 - 199 mg/dL 167   Triglycerides 0 - 149 mg/dL 128   HDL >=40 mg/dL 51   LDL <100 mg/dL 90     The 10-year ASCVD risk score (Paterson DK, et al., 2019) is: 4.1%    She was found to have a history of elevated cholesterol.  She is very active.  She has been able to bring this down with her healthy lifestyle.       Hypothyroidism     Latest Reference Range & Units 06/29/23 10:13   TSH 0.380 - 5.330 uIU/mL 0.240 (L)   Free T-4 0.93 - 1.70 ng/dL 1.09   T3,Free 2.00 - 4.40 pg/mL 5.05 (H)     She reports diagnosis of hypothyroidism in 2008, she has been maintained on [place] Thyroid 90 mg daily with good results. She was also found to have a thyroid nodule on US in June 2021, stable on follow up in June 2022 and June 2023. Recheck in 2024 and 2026 and if stable can stop surveillance.    Current  "regimen: [place] Thyroid 90 mg daily          Current Medications:  Current Outpatient Medications Ordered in Epic   Medication Sig Dispense Refill    COMIRNATY 30 MCG/0.3ML Suspension Prefilled Syringe injection       triamcinolone acetonide (KENALOG) 0.1 % Cream Apply 1 Application topically 2 times a day.      gabapentin (NEURONTIN) 100 MG Cap Take 1-3 Capsules by mouth every evening. 30 Capsule 2    escitalopram (LEXAPRO) 10 MG Tab Take 10 mg by mouth every day.      ARMOUR THYROID 90 MG Tab TAKE 1 TABLET BY MOUTH EVERY DAY IN THE MORNING ON AN EMPTY STOMACH 100 Tablet 3    Probiotic Product (PROBIOTIC PO)       Multiple Vitamins-Minerals (HAIR SKIN AND NAILS FORMULA PO)       Cholecalciferol (VITAMIN D-3) 5000 units Tab Take  by mouth.      B Complex Vitamins (B COMPLEX PO) Take  by mouth.      CALCIUM-MAGNESIUM-ZINC PO Take  by mouth.       No current Epic-ordered facility-administered medications on file.          Objective:   Physical Exam:    Vitals: /60 (BP Location: Left arm, Patient Position: Sitting, BP Cuff Size: Adult)   Pulse 68   Temp 35.8 °C (96.5 °F) (Temporal)   Resp 12   Ht 1.676 m (5' 6\")   Wt 65.8 kg (145 lb)   SpO2 94%    BMI: Body mass index is 23.4 kg/m².  Physical Exam  Constitutional:       General: She is not in acute distress.     Appearance: Normal appearance. She is not ill-appearing.   HENT:      Right Ear: External ear normal. There is impacted cerumen.      Left Ear: External ear normal. There is no impacted cerumen.   Eyes:      General: No scleral icterus.     Conjunctiva/sclera: Conjunctivae normal.   Cardiovascular:      Rate and Rhythm: Normal rate and regular rhythm.      Pulses: Normal pulses.   Pulmonary:      Effort: Pulmonary effort is normal. No respiratory distress.      Breath sounds: No wheezing or rhonchi.   Abdominal:      General: Bowel sounds are normal. There is no distension.      Palpations: Abdomen is soft.      Tenderness: There is no abdominal " tenderness.   Musculoskeletal:      Right lower leg: No edema.      Left lower leg: No edema.   Skin:     General: Skin is warm and dry.      Findings: No rash.   Neurological:      Gait: Gait normal.   Psychiatric:         Mood and Affect: Mood normal.         Behavior: Behavior normal.         Thought Content: Thought content normal.         Judgment: Judgment normal.          Assessment and Plan:   Lidia is a 68 y.o. female with the following:  Problem List Items Addressed This Visit       Dyslipidemia     Chronic stable problem, update lipid panel annually to ensure stability.         Relevant Orders    VITAMIN B12    VITAMIN D,25 HYDROXY (DEFICIENCY)    Lipid Profile    Comp Metabolic Panel    CBC WITH DIFFERENTIAL    Hypothyroidism     Ongoing problem, recheck lab work to ensure stability.  Prefers [place] Thyroid 90 mg daily.  Thyroid ultrasound due in 2024.         Relevant Orders    FREE THYROXINE    TSH    US-THYROID    Major depressive disorder with single episode, in full remission (HCC)     Chronic ongoing problem, mood doing much better on Lexapro, continue Lexapro 10 mg daily.         Right hip pain     Chronic ongoing problem, bothering her at night with sleeping.  Agreeable to trial with gabapentin 100 300 mg at night to help with hip pain.  Recheck in 6 weeks to ensure tolerance.  Side effects discussed in detail.         Relevant Medications    gabapentin (NEURONTIN) 100 MG Cap          RTC: Return in about 2 months (around 5/14/2024).    I spent a total of 34 minutes with record review, exam, communication with the patient, communication with other providers, and documentation of this encounter.    PLEASE NOTE: This dictation was created using voice recognition software. I have made every reasonable attempt to correct obvious errors, but I expect that there are errors of grammar and possibly content that I did not discover before finalizing the note.      Lesa Garcia, DO  Geriatric and  Internal Medicine  [facility]

## 2024-03-14 NOTE — ASSESSMENT & PLAN NOTE
Chronic ongoing problem, bothering her at night with sleeping.  Agreeable to trial with gabapentin 100 300 mg at night to help with hip pain.  Recheck in 6 weeks to ensure tolerance.  Side effects discussed in detail.

## 2024-03-14 NOTE — ASSESSMENT & PLAN NOTE
Ongoing problem, recheck lab work to ensure stability.  Prefers [place] Thyroid 90 mg daily.  Thyroid ultrasound due in 2024.

## 2024-04-02 ENCOUNTER — APPOINTMENT (OUTPATIENT)
Dept: MEDICAL GROUP | Facility: PHYSICIAN GROUP | Age: 69
End: 2024-04-02
Payer: MEDICARE

## 2024-04-24 ENCOUNTER — HOSPITAL ENCOUNTER (OUTPATIENT)
Dept: RADIOLOGY | Facility: MEDICAL CENTER | Age: 69
End: 2024-04-24
Attending: INTERNAL MEDICINE
Payer: MEDICARE

## 2024-04-24 DIAGNOSIS — E03.9 HYPOTHYROIDISM, UNSPECIFIED TYPE: ICD-10-CM

## 2024-04-24 PROCEDURE — 76536 US EXAM OF HEAD AND NECK: CPT

## 2024-05-07 ENCOUNTER — APPOINTMENT (OUTPATIENT)
Dept: MEDICAL GROUP | Facility: PHYSICIAN GROUP | Age: 69
End: 2024-05-07
Payer: MEDICARE

## 2024-05-15 ENCOUNTER — APPOINTMENT (OUTPATIENT)
Dept: MEDICAL GROUP | Facility: PHYSICIAN GROUP | Age: 69
End: 2024-05-15
Payer: MEDICARE

## 2024-06-07 ENCOUNTER — HOSPITAL ENCOUNTER (OUTPATIENT)
Dept: LAB | Facility: MEDICAL CENTER | Age: 69
End: 2024-06-07
Attending: INTERNAL MEDICINE
Payer: MEDICARE

## 2024-06-07 DIAGNOSIS — E78.5 DYSLIPIDEMIA: ICD-10-CM

## 2024-06-07 DIAGNOSIS — E03.9 HYPOTHYROIDISM, UNSPECIFIED TYPE: ICD-10-CM

## 2024-06-07 LAB
25(OH)D3 SERPL-MCNC: 40 NG/ML (ref 30–100)
ALBUMIN SERPL BCP-MCNC: 4.3 G/DL (ref 3.2–4.9)
ALBUMIN/GLOB SERPL: 1.6 G/DL
ALP SERPL-CCNC: 106 U/L (ref 30–99)
ALT SERPL-CCNC: 10 U/L (ref 2–50)
ANION GAP SERPL CALC-SCNC: 11 MMOL/L (ref 7–16)
AST SERPL-CCNC: 19 U/L (ref 12–45)
BASOPHILS # BLD AUTO: 1.2 % (ref 0–1.8)
BASOPHILS # BLD: 0.05 K/UL (ref 0–0.12)
BILIRUB SERPL-MCNC: 0.5 MG/DL (ref 0.1–1.5)
BUN SERPL-MCNC: 14 MG/DL (ref 8–22)
CALCIUM ALBUM COR SERPL-MCNC: 9.1 MG/DL (ref 8.5–10.5)
CALCIUM SERPL-MCNC: 9.3 MG/DL (ref 8.4–10.2)
CHLORIDE SERPL-SCNC: 104 MMOL/L (ref 96–112)
CHOLEST SERPL-MCNC: 214 MG/DL (ref 100–199)
CO2 SERPL-SCNC: 24 MMOL/L (ref 20–33)
CREAT SERPL-MCNC: 0.64 MG/DL (ref 0.5–1.4)
EOSINOPHIL # BLD AUTO: 0.11 K/UL (ref 0–0.51)
EOSINOPHIL NFR BLD: 2.6 % (ref 0–6.9)
ERYTHROCYTE [DISTWIDTH] IN BLOOD BY AUTOMATED COUNT: 38.4 FL (ref 35.9–50)
FASTING STATUS PATIENT QL REPORTED: NORMAL
GFR SERPLBLD CREATININE-BSD FMLA CKD-EPI: 96 ML/MIN/1.73 M 2
GLOBULIN SER CALC-MCNC: 2.7 G/DL (ref 1.9–3.5)
GLUCOSE SERPL-MCNC: 92 MG/DL (ref 65–99)
HCT VFR BLD AUTO: 45.2 % (ref 37–47)
HDLC SERPL-MCNC: 53 MG/DL
HGB BLD-MCNC: 15.6 G/DL (ref 12–16)
IMM GRANULOCYTES # BLD AUTO: 0.01 K/UL (ref 0–0.11)
IMM GRANULOCYTES NFR BLD AUTO: 0.2 % (ref 0–0.9)
LDLC SERPL CALC-MCNC: 121 MG/DL
LYMPHOCYTES # BLD AUTO: 1.16 K/UL (ref 1–4.8)
LYMPHOCYTES NFR BLD: 27.4 % (ref 22–41)
MCH RBC QN AUTO: 31.1 PG (ref 27–33)
MCHC RBC AUTO-ENTMCNC: 34.5 G/DL (ref 32.2–35.5)
MCV RBC AUTO: 90 FL (ref 81.4–97.8)
MONOCYTES # BLD AUTO: 0.3 K/UL (ref 0–0.85)
MONOCYTES NFR BLD AUTO: 7.1 % (ref 0–13.4)
NEUTROPHILS # BLD AUTO: 2.61 K/UL (ref 1.82–7.42)
NEUTROPHILS NFR BLD: 61.5 % (ref 44–72)
NRBC # BLD AUTO: 0 K/UL
NRBC BLD-RTO: 0 /100 WBC (ref 0–0.2)
PLATELET # BLD AUTO: 252 K/UL (ref 164–446)
PMV BLD AUTO: 9.5 FL (ref 9–12.9)
POTASSIUM SERPL-SCNC: 4.5 MMOL/L (ref 3.6–5.5)
PROT SERPL-MCNC: 7 G/DL (ref 6–8.2)
RBC # BLD AUTO: 5.02 M/UL (ref 4.2–5.4)
SODIUM SERPL-SCNC: 139 MMOL/L (ref 135–145)
T4 FREE SERPL-MCNC: 1.17 NG/DL (ref 0.93–1.7)
TRIGL SERPL-MCNC: 202 MG/DL (ref 0–149)
TSH SERPL DL<=0.005 MIU/L-ACNC: 0.11 UIU/ML (ref 0.38–5.33)
VIT B12 SERPL-MCNC: 1068 PG/ML (ref 211–911)
WBC # BLD AUTO: 4.2 K/UL (ref 4.8–10.8)

## 2024-06-07 PROCEDURE — 84439 ASSAY OF FREE THYROXINE: CPT

## 2024-06-07 PROCEDURE — 82607 VITAMIN B-12: CPT

## 2024-06-07 PROCEDURE — 82306 VITAMIN D 25 HYDROXY: CPT

## 2024-06-07 PROCEDURE — 84443 ASSAY THYROID STIM HORMONE: CPT

## 2024-06-07 PROCEDURE — 36415 COLL VENOUS BLD VENIPUNCTURE: CPT

## 2024-06-07 PROCEDURE — 80061 LIPID PANEL: CPT

## 2024-06-07 PROCEDURE — 85025 COMPLETE CBC W/AUTO DIFF WBC: CPT

## 2024-06-07 PROCEDURE — 80053 COMPREHEN METABOLIC PANEL: CPT

## 2024-06-10 ENCOUNTER — TELEPHONE (OUTPATIENT)
Dept: HEALTH INFORMATION MANAGEMENT | Facility: OTHER | Age: 69
End: 2024-06-10

## 2024-06-10 ENCOUNTER — TELEPHONE (OUTPATIENT)
Dept: MEDICAL GROUP | Facility: PHYSICIAN GROUP | Age: 69
End: 2024-06-10
Payer: MEDICARE

## 2024-06-10 NOTE — TELEPHONE ENCOUNTER
----- Message from Fuentes Mclaughlin M.D. sent at 6/7/2024  5:38 PM PDT -----  b12 is elevated so you can decrease vitamin b12 dose to half, if you are taking it everyday, you can decrease the dose to every other day.  Thyroid hormone is still a little high, can discuss with  on June 13th for thyroid dose adjustment. Blood counts show stable counts. Vitamin d is normal.  Cholesterol is a little elevated. Alkalinephosphtase (enzyme in bone and liver) is a little elevated,  which is benign level.

## 2024-06-10 NOTE — TELEPHONE ENCOUNTER
1. Caller Name: Lidia Dior                          Call Back Number: [phone] (home)         How would the patient prefer to be contacted with a response: Phone call OK to leave a detailed message    Spoke with patient regarding labs  confirmed she would be here for appt  6/13/24

## 2024-06-13 ENCOUNTER — APPOINTMENT (OUTPATIENT)
Dept: MEDICAL GROUP | Facility: PHYSICIAN GROUP | Age: 69
End: 2024-06-13
Payer: MEDICARE

## 2024-06-13 VITALS
HEIGHT: 66 IN | BODY MASS INDEX: 23.96 KG/M2 | HEART RATE: 61 BPM | WEIGHT: 149.1 LBS | TEMPERATURE: 96.6 F | SYSTOLIC BLOOD PRESSURE: 100 MMHG | RESPIRATION RATE: 12 BRPM | DIASTOLIC BLOOD PRESSURE: 60 MMHG | OXYGEN SATURATION: 98 %

## 2024-06-13 DIAGNOSIS — E04.1 THYROID NODULE: ICD-10-CM

## 2024-06-13 DIAGNOSIS — Z12.31 ENCOUNTER FOR SCREENING MAMMOGRAM FOR MALIGNANT NEOPLASM OF BREAST: ICD-10-CM

## 2024-06-13 DIAGNOSIS — E78.5 DYSLIPIDEMIA: ICD-10-CM

## 2024-06-13 DIAGNOSIS — F32.5 MAJOR DEPRESSIVE DISORDER WITH SINGLE EPISODE, IN FULL REMISSION (HCC): ICD-10-CM

## 2024-06-13 DIAGNOSIS — R74.8 ELEVATED ALKALINE PHOSPHATASE LEVEL: ICD-10-CM

## 2024-06-13 DIAGNOSIS — J30.9 ALLERGIC RHINITIS, UNSPECIFIED SEASONALITY, UNSPECIFIED TRIGGER: ICD-10-CM

## 2024-06-13 DIAGNOSIS — E03.9 HYPOTHYROIDISM, UNSPECIFIED TYPE: ICD-10-CM

## 2024-06-13 PROCEDURE — 3078F DIAST BP <80 MM HG: CPT | Performed by: INTERNAL MEDICINE

## 2024-06-13 PROCEDURE — G0439 PPPS, SUBSEQ VISIT: HCPCS | Performed by: INTERNAL MEDICINE

## 2024-06-13 PROCEDURE — 3074F SYST BP LT 130 MM HG: CPT | Performed by: INTERNAL MEDICINE

## 2024-06-13 RX ORDER — THYROID 90 MG/1
90 TABLET ORAL
Qty: 100 TABLET | Refills: 3 | Status: SHIPPED | OUTPATIENT
Start: 2024-06-13

## 2024-06-13 ASSESSMENT — ACTIVITIES OF DAILY LIVING (ADL): BATHING_REQUIRES_ASSISTANCE: 0

## 2024-06-13 ASSESSMENT — ENCOUNTER SYMPTOMS: GENERAL WELL-BEING: GOOD

## 2024-06-13 ASSESSMENT — FIBROSIS 4 INDEX: FIB4 SCORE: 1.6

## 2024-06-13 ASSESSMENT — PATIENT HEALTH QUESTIONNAIRE - PHQ9: CLINICAL INTERPRETATION OF PHQ2 SCORE: 0

## 2024-06-13 NOTE — ASSESSMENT & PLAN NOTE
Chronic and improved problem, mood is doing significantly better on current medical therapy, she has gained a few pounds but feels that the Lexapro benefit outweighs any potential side effects we will continue escitalopram 10 mg daily.

## 2024-06-13 NOTE — ASSESSMENT & PLAN NOTE
Chronic and stable problem, no change in probable benign bilateral thyroid nodules, last imaging surveillance will be due around June 2026.

## 2024-06-13 NOTE — PROGRESS NOTES
Chief Complaint   Patient presents with    Annual Exam     Annual       HPI:  Lidia Dior is a 68 y.o. here for Medicare Annual Wellness Visit     Problem   Major Depressive Disorder With Single Episode, in Full Remission (Formerly [facility])    Depression Screening (June 2024)    Little interest or pleasure in doing things?  0 - not at all  Feeling down, depressed , or hopeless?    Patient Health Questionnaire Score: 0    She developed major changes in her mood and started seeing mental health expert.  She was diagnosed with depression and recommended to start on escitalopram 10 mg daily, she started this about 4 to 5 weeks ago and notes tremendous improvement in her mood.  No appreciable side effects except a few pounds of weight gain.    Current regimen: Escitalopram 10 mg daily     Elevated Alkaline Phosphatase Level    Mild finding in 2022, bone predominant on isoenzymes, likely related to mild arthritis.    Previously running in the 80s.  No known liver disease.  She has slight arthritic changes in her hands.     Allergic Rhinitis    She reports challenges with sinus drainage and PND. History of deviated septum. Uses nettipot day with sinus massage in the morning due to congestion. Has not tried regular use of nasal rinses otherwise. Has not met with ENT. She has associated cerumen impaction of right ear canal.     Thyroid Nodule    Thyroid US (4/2024):   1.  No significant change in probably benign bilateral thyroid nodules.     ACR TI-RADS Recommendations  TR4 (1-1.4cm) - follow up ultrasound in 1,2,3 and 5 years    Neck thyroid ultrasound due June 2026.        Dyslipidemia     Latest Reference Range & Units 06/07/24 10:44   Cholesterol,Tot 100 - 199 mg/dL 214 (H)   Triglycerides 0 - 149 mg/dL 202 (H)   HDL >=40 mg/dL 53   LDL <100 mg/dL 121 (H)       The 10-year ASCVD risk score (Soumya HALE, et al., 2019) is: 4.5%    She was found to have a history of elevated cholesterol.  She is very active.  She has been able  to bring this down with her healthy lifestyle.      This deteriorated on follow-up which she relates to being more lax with her healthy eating and having more snacks.  Agreeable to follow-up with CT cardiac score.     Hypothyroidism     Latest Reference Range & Units 06/07/24 10:44   TSH 0.380 - 5.330 uIU/mL 0.109 (L)   Free T-4 0.93 - 1.70 ng/dL 1.17     She reports diagnosis of hypothyroidism in 2008, she has been maintained on [place] Thyroid 90 mg daily with good results. She was also found to have a thyroid nodule on US in June 2021, stable on follow up in June 2022, June 2023, April 2024. Recheck in 2026 and if stable can stop surveillance.    Current regimen: [place] Thyroid 90 mg daily           Patient Active Problem List    Diagnosis Date Noted    Acute cough 01/03/2024    Major depressive disorder with single episode, in full remission (HCC) 11/20/2023    Slow transit constipation 11/20/2023    Kidney cysts 11/20/2023    Sun-damaged skin 07/03/2023    Pain of left heel 01/09/2023    Plantar fasciitis of left foot 11/22/2022    Skin lesion 07/07/2022    Trigger ring finger of right hand 07/07/2022    Lump of left wrist 07/07/2022    Elevated alkaline phosphatase level 07/07/2022    Palpitations 03/08/2022    Allergic rhinitis 03/08/2022    Right hip pain 10/13/2021    Thyroid nodule 10/13/2021    Microcalcification of left breast on mammogram 10/13/2021    Advance care planning 10/13/2021    Dyslipidemia 10/03/2018    Healthcare maintenance 07/16/2018    Hypothyroidism 07/16/2018    Vitamin D deficiency 07/16/2018    Postsurgical menopause 07/16/2018       Current Outpatient Medications   Medication Sig Dispense Refill    thyroid (ARMOUR THYROID) 90 MG Tab Take 1 Tablet by mouth every morning on an empty stomach. 100 Tablet 3    triamcinolone acetonide (KENALOG) 0.1 % Cream Apply 1 Application topically 2 times a day.      escitalopram (LEXAPRO) 10 MG Tab Take 10 mg by mouth every day.      Probiotic Product  (PROBIOTIC PO)       Multiple Vitamins-Minerals (HAIR SKIN AND NAILS FORMULA PO)       Cholecalciferol (VITAMIN D-3) 5000 units Tab Take  by mouth.      B Complex Vitamins (B COMPLEX PO) Take  by mouth.      CALCIUM-MAGNESIUM-ZINC PO Take  by mouth.       No current facility-administered medications for this visit.          Current supplements as per medication list.     Allergies: Aspirin and Latex    Current social contact/activities:      She  reports that she has never smoked. She has never used smokeless tobacco. She reports current alcohol use. She reports that she does not use drugs.  Counseling given: Not Answered      ROS:    Gait: Uses no assistive device  Ostomy: No  Other tubes: No  Amputations: No  Chronic oxygen use: No  Last eye exam: 12/20/23  Wears hearing aids: No   : Denies any urinary leakage during the last 6 months    Screening:    Depression Screening  Little interest or pleasure in doing things?  0 - not at all  Feeling down, depressed , or hopeless?    Trouble falling or staying asleep, or sleeping too much?     Feeling tired or having little energy?     Poor appetite or overeating?     Feeling bad about yourself - or that you are a failure or have let yourself or your family down?    Trouble concentrating on things, such as reading the newspaper or watching television?    Moving or speaking so slowly that other people could have noticed.  Or the opposite - being so fidgety or restless that you have been moving around a lot more than usual?     Thoughts that you would be better off dead, or of hurting yourself?     Patient Health Questionnaire Score:      If depressive symptoms identified deferred to follow up visit unless specifically addressed in assessment and plan.    Interpretation of PHQ-9 Total Score   Score Severity   1-4 No Depression   5-9 Mild Depression   10-14 Moderate Depression   15-19 Moderately Severe Depression   20-27 Severe Depression    Screening for Cognitive  Impairment  Do you or any of your friends or family members have any concern about your memory? Yes  Three Minute Recall (Leader, Season, Table) 3/3    Jose Roberto clock face with all 12 numbers and set the hands to show 10 minutes after 11.  No Setswana first language   numbers wrong   Cognitive concerns identified deferred for follow up unless specifically addressed in assessment and plan.    Fall Risk Assessment  Has the patient had two or more falls in the last year or any fall with injury in the last year?  No    Safety Assessment  Do you always wear your seatbelt?  Yes  Any changes to home needed to function safely? No  Difficulty hearing.  No  Patient counseled about all safety risks that were identified.    Functional Assessment ADLs  Are there any barriers preventing you from cooking for yourself or meeting nutritional needs?  No.    Are there any barriers preventing you from driving safely or obtaining transportation?  No.    Are there any barriers preventing you from using a telephone or calling for help?  No    Are there any barriers preventing you from shopping?  No.    Are there any barriers preventing you from taking care of your own finances?  No    Are there any barriers preventing you from managing your medications?  No    Are there any barriers preventing you from showering, bathing or dressing yourself? No    Are there any barriers preventing you from doing housework or laundry? No    Are there any barriers preventing you from using the toilet?No    Are you currently engaging in any exercise or physical activity?  Yes. Walks  work out with weights  pilates     Self-Assessment of Health  What is your perception of your health? Good    Do you sleep more than six hours a night? Yes    In the past 7 days, how much did pain keep you from doing your normal work? None    Do you spend quality time with family or friends (virtually or in person)? Yes    Do you usually eat a heart healthy diet that constists of a  variety of fruits, vegetables, whole grains and fiber? Yes    Do you eat foods high in fat and/or Fast Food more than three times per week? No    How concerned are you that your medical conditions are not being well managed? Not at all    Are you worried that in the next 2 months, you may not have stable housing that you own, rent, or stay in as part of a household? No        Advance Care Planning  Do you have an Advance Directive, Living Will, Durable Power of , or POLST? Yes  Advance Directive              Health Maintenance Summary            Ordered - Mammogram (Yearly) Ordered on 6/13/2024 06/30/2023  MA-SCREENING MAMMO BILAT W/TOMOSYNTHESIS W/CAD    05/09/2022  MA-DIAGNOSTIC MAMMO LEFT W/TOMOSYNTHESIS W/CAD    10/26/2021  MA-DIAGNOSTIC MAMMO BILAT W/TOMOSYNTHESIS W/CAD    10/13/2020  MA-DIAGNOSTIC MAMMO LEFT W/O CAD    10/07/2020  MA-SCREENING MAMMO BILAT W/TOMOSYNTHESIS W/CAD    Only the first 5 history entries have been loaded, but more history exists.              Annual Wellness Visit (Yearly) Next due on 6/13/2025 06/13/2024  Level of Service: ANNUAL WELLNESS VISIT-INCLUDES PPPS SUBSEQUE*    11/20/2023  Level of Service: ANNUAL WELLNESS VISIT-INCLUDES PPPS SUBSEQUE*    03/08/2022  Level of Service: [place] ANNUAL WELLNESS VISIT-INCLUDES PPPS SUBSEQUE*              Bone Density Scan (Every 5 Years) Next due on 8/24/2028 08/24/2023  DS-BONE DENSITY STUDY (DEXA)    08/15/2018  DS-BONE DENSITY STUDY (DEXA)    10/15/2015  DS-BONE DENSITY STUDY (DEXA)              IMM DTaP/Tdap/Td Vaccine (2 - Td or Tdap) Next due on 10/2/2028      10/02/2018  Imm Admin: Tdap Vaccine              Colorectal Cancer Screening (Colonoscopy - Every 10 Years) Tentatively due on 9/24/2030 09/24/2020  REFERRAL TO GI FOR COLONOSCOPY              Zoster (Shingles) Vaccines (Series Information) Completed      12/27/2020  Imm Admin: Zoster Vaccine Recombinant (RZV) (SHINGRIX)    08/31/2020  Imm Admin: Zoster  Vaccine Recombinant (RZV) (SHINGRIX)    07/16/2011  Imm Admin: Zoster Vaccine Live (ZVL) (Zostavax) - HISTORICAL DATA              Hepatitis C Screening  Tentatively Complete      02/11/2021  Hepatitis C Antibody component of HEP C VIRUS ANTIBODY              Influenza Vaccine (Series Information) Completed      09/25/2023  Imm Admin: Influenza Vaccine, Quadrivalent, Adjuvanted (Pf)    10/05/2022  Imm Admin: Influenza, Unspecified - HISTORICAL DATA    10/13/2021  Imm Admin: Influenza Vaccine Adult HD    08/31/2020  Imm Admin: Influenza Vaccine Quad Inj (Pf)    10/21/2019  Imm Admin: Influenza Vaccine Quad Inj (Pf)    Only the first 5 history entries have been loaded, but more history exists.              Pneumococcal Vaccine: 65+ Years (Series Information) Completed      11/03/2023  Imm Admin: Pneumococcal Conjugate Vaccine (PCV20)    03/08/2022  Imm Admin: Pneumococcal polysaccharide vaccine (PPSV-23)              COVID-19 Vaccine (Series Information) Completed      12/27/2023  Imm Admin: Comirnaty (Covid-19 Vaccine, Mrna, [id number] Formula)    10/05/2022  Imm Admin: PFIZER BIVALENT SARS-COV-2 VACCINE (12+)    10/26/2021  Imm Admin: PFIZER PURPLE CAP SARS-COV-2 VACCINATION (12+)    04/22/2021  Imm Admin: PFIZER PURPLE CAP SARS-COV-2 VACCINATION (12+)    03/31/2021  Imm Admin: PFIZER PURPLE CAP SARS-COV-2 VACCINATION (12+)    Only the first 5 history entries have been loaded, but more history exists.              Hepatitis A Vaccine (Hep A) (Series Information) Aged Out      No completion history exists for this topic.              Hepatitis B Vaccine (Hep B) (Series Information) Aged Out      No completion history exists for this topic.              HPV Vaccines (Series Information) Aged Out      No completion history exists for this topic.              Polio Vaccine (Inactivated Polio) (Series Information) Aged Out      No completion history exists for this topic.              Meningococcal Immunization (Series  Information) Aged Out      No completion history exists for this topic.              Discontinued - Cervical Cancer Screening  Discontinued        Frequency changed to Never automatically (Topic No Longer Applies)    06/08/2021  THINPREP PAP WITH HPV    06/08/2021  Pathology Gynecology Specimen    03/30/2011  PAP IG (IMAGE GUIDED)                    Patient Care Team:  Lesa Garcia D.O. as PCP - General (Internal Medicine)      Social History     Tobacco Use    Smoking status: Never    Smokeless tobacco: Never   Vaping Use    Vaping status: Never Used   Substance Use Topics    Alcohol use: Yes     Alcohol/week: 0.0 - 0.6 oz     Comment: glass of wine 1-2x /wk    Drug use: No     Family History   Problem Relation Age of Onset    Diabetes Mother         Adult onset, on insulin    Arthritis Mother         RA    Heart Disease Father         CHF    Diabetes Sister         borderline    Thyroid Sister         hypothyroid    Diabetes Sister         borderline    Diabetes Sister     Diabetes Sister     Diabetes Sister     Other Son         AV valve repair - possibly genetic    Hypertension Son      She  has a past medical history of Abnormal mammogram (11/3/2021), B12 deficiency (7/16/2018), COVID-19 virus infection (5/2/2023), Current long-term use of postmenopausal hormone replacement therapy (7/16/2018), Dyslipidemia, Elevated fasting blood sugar (10/13/2021), Moderate episode of recurrent major depressive disorder (HCC) (1/10/2023), Other insomnia (1/10/2023), Ovarian cyst (10/2005), Renal cyst (9/215), Renal cyst (10/2008), and Thrombocytopenia (HCC) (3/8/2022).   Past Surgical History:   Procedure Laterality Date    OTHER SURGICAL PROCEDURE Left 10/09/2006    Varicose veins procedure in office    MARKUS BY LAPAROSCOPY  07/31/2003    due to cholecystitis    OTHER ORTHOPEDIC SURGERY Right 01/26/2001    slipped on ice. Pins in wrist    HYSTERECTOMY LAPAROSCOPY  10/1995    partial (still has ovaries). Hx of  "fibroids/menorrhagia       Exam:   /60 (BP Location: Right arm, Patient Position: Sitting, BP Cuff Size: Adult)   Pulse 61   Temp 35.9 °C (96.6 °F) (Temporal)   Resp 12   Ht 1.676 m (5' 6\")   Wt 67.6 kg (149 lb 1.6 oz)   SpO2 98%  Body mass index is 24.07 kg/m².    Hearing good.    Dentition good  Alert, oriented in no acute distress.  Eye contact is good, speech goal directed, affect calm    Assessment and Plan. The following treatment and monitoring plan is recommended:      Problem List Items Addressed This Visit       Allergic rhinitis     Chronic ongoing problem, today it manifests more by itchy eyes.  She used her Visine allergy drops this morning.  Her allergy symptoms does flareup when there is more pollen in the air.  Continue with saline rinses and over-the-counter steroid nasal sprays and allergy eyedrops.         Dyslipidemia     Chronic and decompensated problem, will follow-up with CT cardiac score for additional risk stratification for coronary artery disease moving forward.  Could also be related to ongoing subclinical hyperthyroidism contributing to dyslipidemia.         Relevant Orders    CT-CARDIAC SCORING    Elevated alkaline phosphatase level     Chronic ongoing problem, no real change in some level, likely related to mild arthritis, continue with observation to ensure ongoing stability.         Hypothyroidism     Chronic ongoing problem, no signs or symptoms of overtreatment at this time, free T4 still low despite suppressed TSH.  She feels less will continue current dose of [place] Thyroid 90 mg daily per her preference.         Relevant Medications    thyroid (ARMOUR THYROID) 90 MG Tab    Major depressive disorder with single episode, in full remission (HCC)     Chronic and improved problem, mood is doing significantly better on current medical therapy, she has gained a few pounds but feels that the Lexapro benefit outweighs any potential side effects we will continue escitalopram " 10 mg daily.         Thyroid nodule     Chronic and stable problem, no change in probable benign bilateral thyroid nodules, last imaging surveillance will be due around June 2026.         Relevant Medications    thyroid (ARMOUR THYROID) 90 MG Tab     Other Visit Diagnoses       Encounter for screening mammogram for malignant neoplasm of breast        Relevant Orders    MA-SCREENING MAMMO BILAT W/TOMOSYNTHESIS W/CAD                Services suggested: No services needed at this time  Health Care Screening: Age-appropriate preventive services recommended by USPTF and ACIP covered by Medicare were discussed today. Services ordered if indicated and agreed upon by the patient.  Referrals offered: Community-based lifestyle interventions to reduce health risks and promote self-management and wellness, fall prevention, nutrition, physical activity, tobacco-use cessation, weight loss, and mental health services as per orders if indicated.    Discussion today about general wellness and lifestyle habits:    Prevent falls and reduce trip hazards; Cautioned about securing or removing rugs.  Have a working fire alarm and carbon monoxide detector;   Engage in regular physical activity and social activities     Follow-up: Return in about 4 months (around 10/13/2024).    I spent a total of 32 minutes with record review, exam, communication with the patient, communication with other providers, and documentation of this encounter.    Lesa Garcia, DO  Geriatric and Internal Medicine  [facility]

## 2024-06-13 NOTE — ASSESSMENT & PLAN NOTE
Chronic ongoing problem, today it manifests more by itchy eyes.  She used her Visine allergy drops this morning.  Her allergy symptoms does flareup when there is more pollen in the air.  Continue with saline rinses and over-the-counter steroid nasal sprays and allergy eyedrops.

## 2024-06-13 NOTE — ASSESSMENT & PLAN NOTE
Chronic ongoing problem, no signs or symptoms of overtreatment at this time, free T4 still low despite suppressed TSH.  She feels less will continue current dose of [place] Thyroid 90 mg daily per her preference.

## 2024-06-13 NOTE — ASSESSMENT & PLAN NOTE
Chronic ongoing problem, no real change in some level, likely related to mild arthritis, continue with observation to ensure ongoing stability.

## 2024-06-13 NOTE — ASSESSMENT & PLAN NOTE
Chronic and decompensated problem, will follow-up with CT cardiac score for additional risk stratification for coronary artery disease moving forward.  Could also be related to ongoing subclinical hyperthyroidism contributing to dyslipidemia.

## 2024-07-01 ENCOUNTER — HOSPITAL ENCOUNTER (OUTPATIENT)
Dept: RADIOLOGY | Facility: MEDICAL CENTER | Age: 69
End: 2024-07-01
Attending: INTERNAL MEDICINE
Payer: MEDICARE

## 2024-07-01 ENCOUNTER — HOSPITAL ENCOUNTER (OUTPATIENT)
Dept: RADIOLOGY | Facility: MEDICAL CENTER | Age: 69
End: 2024-07-01
Attending: INTERNAL MEDICINE
Payer: COMMERCIAL

## 2024-07-01 DIAGNOSIS — Z12.31 ENCOUNTER FOR SCREENING MAMMOGRAM FOR MALIGNANT NEOPLASM OF BREAST: ICD-10-CM

## 2024-07-01 DIAGNOSIS — E78.5 DYSLIPIDEMIA: ICD-10-CM

## 2024-07-01 PROCEDURE — 77063 BREAST TOMOSYNTHESIS BI: CPT

## 2024-07-01 PROCEDURE — 4410556 CT-CARDIAC SCORING (SELF PAY ONLY)

## 2024-07-22 ENCOUNTER — PATIENT MESSAGE (OUTPATIENT)
Dept: MEDICAL GROUP | Facility: PHYSICIAN GROUP | Age: 69
End: 2024-07-22
Payer: MEDICARE

## 2024-07-22 RX ORDER — ESCITALOPRAM OXALATE 10 MG/1
10 TABLET ORAL DAILY
Qty: 100 TABLET | Refills: 3 | Status: SHIPPED | OUTPATIENT
Start: 2024-07-22

## 2024-08-13 ENCOUNTER — APPOINTMENT (OUTPATIENT)
Dept: MEDICAL GROUP | Facility: PHYSICIAN GROUP | Age: 69
End: 2024-08-13
Payer: MEDICARE

## 2024-10-21 ENCOUNTER — APPOINTMENT (OUTPATIENT)
Dept: MEDICAL GROUP | Facility: PHYSICIAN GROUP | Age: 69
End: 2024-10-21
Payer: MEDICARE

## 2024-12-03 ENCOUNTER — APPOINTMENT (OUTPATIENT)
Dept: MEDICAL GROUP | Facility: PHYSICIAN GROUP | Age: 69
End: 2024-12-03
Payer: MEDICARE

## 2024-12-03 VITALS
RESPIRATION RATE: 16 BRPM | HEIGHT: 66 IN | OXYGEN SATURATION: 97 % | TEMPERATURE: 97.3 F | HEART RATE: 65 BPM | SYSTOLIC BLOOD PRESSURE: 130 MMHG | DIASTOLIC BLOOD PRESSURE: 72 MMHG | WEIGHT: 149.9 LBS | BODY MASS INDEX: 24.09 KG/M2

## 2024-12-03 DIAGNOSIS — M70.62 TROCHANTERIC BURSITIS OF LEFT HIP: ICD-10-CM

## 2024-12-03 DIAGNOSIS — E55.9 VITAMIN D DEFICIENCY: ICD-10-CM

## 2024-12-03 DIAGNOSIS — E53.8 B12 DEFICIENCY: ICD-10-CM

## 2024-12-03 DIAGNOSIS — E78.5 DYSLIPIDEMIA: ICD-10-CM

## 2024-12-03 DIAGNOSIS — E03.9 HYPOTHYROIDISM, UNSPECIFIED TYPE: ICD-10-CM

## 2024-12-03 DIAGNOSIS — L30.9 ECZEMA, UNSPECIFIED TYPE: ICD-10-CM

## 2024-12-03 DIAGNOSIS — R05.1 ACUTE COUGH: ICD-10-CM

## 2024-12-03 DIAGNOSIS — F33.1 MODERATE EPISODE OF RECURRENT MAJOR DEPRESSIVE DISORDER (HCC): ICD-10-CM

## 2024-12-03 DIAGNOSIS — E04.2 MULTIPLE THYROID NODULES: ICD-10-CM

## 2024-12-03 PROCEDURE — 3078F DIAST BP <80 MM HG: CPT | Performed by: INTERNAL MEDICINE

## 2024-12-03 PROCEDURE — 3075F SYST BP GE 130 - 139MM HG: CPT | Performed by: INTERNAL MEDICINE

## 2024-12-03 PROCEDURE — 99214 OFFICE O/P EST MOD 30 MIN: CPT | Performed by: INTERNAL MEDICINE

## 2024-12-03 PROCEDURE — G2211 COMPLEX E/M VISIT ADD ON: HCPCS | Performed by: INTERNAL MEDICINE

## 2024-12-03 RX ORDER — ESCITALOPRAM OXALATE 20 MG/1
20 TABLET ORAL DAILY
Qty: 100 TABLET | Refills: 3 | Status: SHIPPED | OUTPATIENT
Start: 2024-12-03

## 2024-12-03 RX ORDER — ACETAMINOPHEN 500 MG
500-1000 TABLET ORAL EVERY 6 HOURS PRN
COMMUNITY

## 2024-12-03 ASSESSMENT — FIBROSIS 4 INDEX: FIB4 SCORE: 1.62

## 2024-12-03 ASSESSMENT — PATIENT HEALTH QUESTIONNAIRE - PHQ9
CLINICAL INTERPRETATION OF PHQ2 SCORE: 2
5. POOR APPETITE OR OVEREATING: 2 - MORE THAN HALF THE DAYS
SUM OF ALL RESPONSES TO PHQ QUESTIONS 1-9: 11

## 2024-12-03 NOTE — PROGRESS NOTES
Subjective:   Chief Complaint/History of Present Illness:  Lidia Dior is a 69 y.o. female established patient who presents today to discuss medical problems as listed below. Lidia is unaccompanied for today's visit.    History of Present Illness  The patient presents for evaluation of multiple medical concerns.    She reports an improvement in her hip bursitis, which she has been managing since 08/2024. The condition has improved significantly, although she still experiences discomfort upon waking. Physical therapy and anti-inflammatory medication, specifically meloxicam taken nightly with food, have been beneficial. An x-ray revealed no arthritis, and her hip surgeon has advised against surgery. She has gained approximately 10 pounds over the past year, which has affected her motivation to exercise.    She has been taking Lexapro for a year, which initially improved her mood, but she now feels less motivated. She is unsure if this is due to her hip pain or a typical response to her situation. Her sleep has been disrupted recently, and she reports an increased appetite, which she attributes to Lexapro. She is considering discontinuing Lexapro after this year.    She discontinued hormone therapy a year ago due to calcification found on a mammogram. She admits to occasional unhealthy eating habits and has stopped her regular walks around the block. She is also taking thyroid medication (90 mg NP thyroid), Kenalog cream for eczema, probiotics, multivitamins, vitamin D, vitamin B, calcium, zinc, and magnesium supplements. She plans to have blood work done at her next visit and is considering starting cholesterol medication. She has thyroid nodules and is due for a follow-up ultrasound in either 2025 or 2026.    She has been experiencing a dry cough for a couple of weeks, which she believes is due to allergies. The cough is triggered by changes in temperature, such as moving from cold air to a warm  "environment. She does not feel ill and reports no fever or chills. She has not taken any medication for the cough, but drinks water and tea to soothe it. She also reports a runny nose, which she attributes to allergies. She occasionally uses a neti pot and plans to resume its use. She believes the dry air from her fireplace may be contributing to her symptoms.    IMMUNIZATIONS  She had her COVID-19 vaccine yesterday.       Current Medications:  Current Outpatient Medications Ordered in Epic   Medication Sig Dispense Refill    acetaminophen (TYLENOL) 500 MG Tab Take 500-1,000 mg by mouth every 6 hours as needed.      escitalopram (LEXAPRO) 20 MG tablet Take 1 Tablet by mouth every day. 100 Tablet 3    meloxicam (MOBIC) 15 MG tablet Take 1 Tablet by mouth every day. 30 Tablet 1    thyroid (ARMOUR THYROID) 90 MG Tab Take 1 Tablet by mouth every morning on an empty stomach. 100 Tablet 3    triamcinolone acetonide (KENALOG) 0.1 % Cream Apply 1 Application topically 2 times a day.      Probiotic Product (PROBIOTIC PO)       Cholecalciferol (VITAMIN D-3) 5000 units Tab Take  by mouth.      B Complex Vitamins (B COMPLEX PO) Take  by mouth.      CALCIUM-MAGNESIUM-ZINC PO Take  by mouth.      Multiple Vitamins-Minerals (HAIR SKIN AND NAILS FORMULA PO)        No current Epic-ordered facility-administered medications on file.          Objective:   Physical Exam:    Vitals: /72 (BP Location: Right arm, Patient Position: Sitting, BP Cuff Size: Adult)   Pulse 65   Temp 36.3 °C (97.3 °F) (Temporal)   Resp 16   Ht 1.676 m (5' 6\")   Wt 68 kg (149 lb 14.4 oz)   SpO2 97%    BMI: Body mass index is 24.19 kg/m².  Physical Exam  Constitutional:       General: She is not in acute distress.     Appearance: Normal appearance. She is normal weight. She is not ill-appearing, toxic-appearing or diaphoretic.   HENT:      Head: Normocephalic and atraumatic.      Right Ear: External ear normal. There is impacted cerumen.      Left Ear: " Tympanic membrane, ear canal and external ear normal. There is no impacted cerumen.      Nose: Nose normal.      Mouth/Throat:      Pharynx: No posterior oropharyngeal erythema.   Eyes:      General: No scleral icterus.     Extraocular Movements: Extraocular movements intact.      Conjunctiva/sclera: Conjunctivae normal.      Pupils: Pupils are equal, round, and reactive to light.   Cardiovascular:      Rate and Rhythm: Normal rate and regular rhythm.      Pulses: Normal pulses.      Heart sounds: Normal heart sounds. No murmur heard.  Pulmonary:      Effort: Pulmonary effort is normal. No respiratory distress.      Breath sounds: Normal breath sounds. No wheezing or rales.   Abdominal:      General: Abdomen is flat. There is no distension.      Palpations: Abdomen is soft.      Tenderness: There is no abdominal tenderness. There is no guarding.   Musculoskeletal:         General: Normal range of motion.      Cervical back: Normal range of motion.   Neurological:      General: No focal deficit present.      Mental Status: She is alert and oriented to person, place, and time.      Cranial Nerves: No cranial nerve deficit.   Psychiatric:         Mood and Affect: Mood normal.         Behavior: Behavior normal.         Thought Content: Thought content normal.         Judgment: Judgment normal.        Depression Screening    Little interest or pleasure in doing things?  2 - more than half the days   Feeling down, depressed , or hopeless? 0 - not at all   Trouble falling or staying asleep, or sleeping too much?  2 - more than half the days   Feeling tired or having little energy?  2 - more than half the days   Poor appetite or overeating?  2 - more than half the days   Feeling bad about yourself - or that you are a failure or have let yourself or your family down? 1 - several days   Trouble concentrating on things, such as reading the newspaper or watching television? 2 - more than half the days   Moving or speaking so  slowly that other people could have noticed.  Or the opposite - being so fidgety or restless that you have been moving around a lot more than usual?  0 - not at all   Thoughts that you would be better off dead, or of hurting yourself?  0 - not at all   Patient Health Questionnaire Score: 11      Assessment & Plan  1. Left hip trochanteric bursitis  The bursitis has improved significantly since August with physical therapy and meloxicam. She continues to take meloxicam 15 mg every night with food and remains upright for 30 minutes to an hour after ingestion to prevent stomach irritation. X-rays showed no concerns with the hip joint or arthritis. The hip surgeon indicated no need for surgery at this time.    2. Major depressive disorder with moderate episode, recurrent, currently active  She has been taking Lexapro 10 mg for a year and reports it has helped her mood, but she feels she has taken a step back recently. She is experiencing decreased motivation to exercise and socialize. An increase in Lexapro dosage to 20 mg for 2 months was recommended to potentially enhance its therapeutic effect. She was advised to monitor her weight and appetite, as Lexapro can increase appetite. She is encouraged to maintain an active lifestyle, including walking and other exercises, to build strong muscles and bones. Lidia would like to have the energy and interest in getting out of her house more and staying more active. She feels the medication helps with her mood but also increases her appetite.  PHQ9 score in clinic today is 11 as noted above.    3. Weight Gain.  She reports a weight gain of approximately 10 pounds over the last year and feels less motivated to work out. Her BMI remains within a healthy range, and the weight gain is not concerning at this point. She was advised to focus on healthy eating and regular physical activity.    4. Acute cough  She has had a dry cough for a couple of weeks, which she attributes to  allergies and dry indoor air. Her lungs sound clear, indicating a low likelihood of any serious lung conditions. She was advised to monitor her cough closely and use a neti pot for nasal dryness. If the cough persists until her next visit, further investigation will be necessary. Likely allergy type picture. Will plan to utilize nasal rinses in event some is due to post nasal drip. Will follow-up cough in 2 months to ensure improvement.     5. Eczema.  She uses Kenalog cream on her back for eczema as needed.    6. Thyroid Nodules.  7. Hypothyroidism  She continues to take her NP thyroid medication at 90 mg. A follow-up ultrasound is planned for completion in 2026.    8. Hyperlipidemia.  She prefers to manage her cholesterol through diet and exercise rather than starting cholesterol medication. She was advised to focus on healthy eating and regular physical activity. Labs will be ordered for February 2025 to monitor her progress.    Follow-up  Return in 2 months for follow up.      Assessment and Plan:   Lidia is a 69 y.o. female with the following:  Problem List Items Addressed This Visit       Dyslipidemia    Relevant Orders    CBC WITH DIFFERENTIAL    Comp Metabolic Panel    Lipid Profile    Hypothyroidism    Relevant Orders    TSH WITH REFLEX TO FT4    Moderate episode of recurrent major depressive disorder (HCC)    Relevant Medications    escitalopram (LEXAPRO) 20 MG tablet    Other Relevant Orders    Patient has been identified as having a positive depression screening. Appropriate orders and counseling have been given. (Completed)    Trochanteric bursitis of left hip    Vitamin D deficiency    Relevant Orders    VITAMIN D,25 HYDROXY (DEFICIENCY)     Other Visit Diagnoses       B12 deficiency        Relevant Orders    VITAMIN B12               RTC: Return in about 2 months (around 2/3/2025).    I spent a total of 33 minutes with record review, exam, communication with the patient, communication with other  providers, and documentation of this encounter.    Verbal consent was acquired by the patient to use TIFFAN[employer] ambient listening note generation during this visit Yes     Billing : secondary to the complexity of this patient's illnesses and their interactions.  All problems listed were discussed during the office visit, medications were evaluated and complexities were discussed as well as plan for the future.     PLEASE NOTE: This dictation was created using voice recognition software. I have made every reasonable attempt to correct obvious errors, but I expect that there are errors of grammar and possibly content that I did not discover before finalizing the note.    Patient was seen in conjunction with our geriatric fellow, Dr. Andrei Choe. Pertinent details of the history and examination were verified and we discussed the assessment and plan in detail. Orders were reviewed together. I agree with the documentation in the note with additions or changes made as indicated above.     Rolando Choe, DO  Geriatric fellow, [employer]    Lesa Garcia, DO  Geriatric and Internal Medicine  [facility]

## 2024-12-04 NOTE — ADDENDUM NOTE
Addended by: JAYDEN WILKINSON on: 12/3/2024 11:06 PM     Modules accepted: Orders, Level of Service

## 2024-12-05 ENCOUNTER — APPOINTMENT (OUTPATIENT)
Dept: MEDICAL GROUP | Facility: PHYSICIAN GROUP | Age: 69
End: 2024-12-05
Payer: MEDICARE

## 2024-12-30 ENCOUNTER — APPOINTMENT (OUTPATIENT)
Dept: RADIOLOGY | Facility: MEDICAL CENTER | Age: 69
End: 2024-12-30
Attending: STUDENT IN AN ORGANIZED HEALTH CARE EDUCATION/TRAINING PROGRAM
Payer: MEDICARE

## 2024-12-30 ENCOUNTER — HOSPITAL ENCOUNTER (EMERGENCY)
Facility: MEDICAL CENTER | Age: 69
End: 2024-12-30
Attending: STUDENT IN AN ORGANIZED HEALTH CARE EDUCATION/TRAINING PROGRAM
Payer: MEDICARE

## 2024-12-30 VITALS
WEIGHT: 151.68 LBS | DIASTOLIC BLOOD PRESSURE: 66 MMHG | TEMPERATURE: 97.8 F | BODY MASS INDEX: 24.38 KG/M2 | RESPIRATION RATE: 12 BRPM | HEART RATE: 64 BPM | SYSTOLIC BLOOD PRESSURE: 132 MMHG | OXYGEN SATURATION: 94 % | HEIGHT: 66 IN

## 2024-12-30 DIAGNOSIS — R11.2 NAUSEA AND VOMITING, UNSPECIFIED VOMITING TYPE: ICD-10-CM

## 2024-12-30 DIAGNOSIS — J06.9 VIRAL URI WITH COUGH: Primary | ICD-10-CM

## 2024-12-30 LAB
ALBUMIN SERPL BCP-MCNC: 4.2 G/DL (ref 3.2–4.9)
ALBUMIN/GLOB SERPL: 1.4 G/DL
ALP SERPL-CCNC: 107 U/L (ref 30–99)
ALT SERPL-CCNC: 53 U/L (ref 2–50)
ANION GAP SERPL CALC-SCNC: 11 MMOL/L (ref 7–16)
AST SERPL-CCNC: 36 U/L (ref 12–45)
BASOPHILS # BLD AUTO: 1.2 % (ref 0–1.8)
BASOPHILS # BLD: 0.05 K/UL (ref 0–0.12)
BILIRUB SERPL-MCNC: 0.5 MG/DL (ref 0.1–1.5)
BUN SERPL-MCNC: 9 MG/DL (ref 8–22)
CALCIUM ALBUM COR SERPL-MCNC: 9 MG/DL (ref 8.5–10.5)
CALCIUM SERPL-MCNC: 9.2 MG/DL (ref 8.4–10.2)
CHLORIDE SERPL-SCNC: 104 MMOL/L (ref 96–112)
CO2 SERPL-SCNC: 24 MMOL/L (ref 20–33)
CREAT SERPL-MCNC: 0.55 MG/DL (ref 0.5–1.4)
EKG IMPRESSION: NORMAL
EOSINOPHIL # BLD AUTO: 0.17 K/UL (ref 0–0.51)
EOSINOPHIL NFR BLD: 4 % (ref 0–6.9)
ERYTHROCYTE [DISTWIDTH] IN BLOOD BY AUTOMATED COUNT: 38.7 FL (ref 35.9–50)
FLUAV RNA SPEC QL NAA+PROBE: NEGATIVE
FLUBV RNA SPEC QL NAA+PROBE: NEGATIVE
GFR SERPLBLD CREATININE-BSD FMLA CKD-EPI: 100 ML/MIN/1.73 M 2
GLOBULIN SER CALC-MCNC: 2.9 G/DL (ref 1.9–3.5)
GLUCOSE SERPL-MCNC: 110 MG/DL (ref 65–99)
HCT VFR BLD AUTO: 42.8 % (ref 37–47)
HGB BLD-MCNC: 15 G/DL (ref 12–16)
IMM GRANULOCYTES # BLD AUTO: 0 K/UL (ref 0–0.11)
IMM GRANULOCYTES NFR BLD AUTO: 0 % (ref 0–0.9)
LIPASE SERPL-CCNC: 31 U/L (ref 11–82)
LYMPHOCYTES # BLD AUTO: 1.53 K/UL (ref 1–4.8)
LYMPHOCYTES NFR BLD: 36.4 % (ref 22–41)
MCH RBC QN AUTO: 31.4 PG (ref 27–33)
MCHC RBC AUTO-ENTMCNC: 35 G/DL (ref 32.2–35.5)
MCV RBC AUTO: 89.7 FL (ref 81.4–97.8)
MONOCYTES # BLD AUTO: 0.46 K/UL (ref 0–0.85)
MONOCYTES NFR BLD AUTO: 11 % (ref 0–13.4)
NEUTROPHILS # BLD AUTO: 1.99 K/UL (ref 1.82–7.42)
NEUTROPHILS NFR BLD: 47.4 % (ref 44–72)
NRBC # BLD AUTO: 0 K/UL
NRBC BLD-RTO: 0 /100 WBC (ref 0–0.2)
PLATELET # BLD AUTO: 256 K/UL (ref 164–446)
PMV BLD AUTO: 9.1 FL (ref 9–12.9)
POTASSIUM SERPL-SCNC: 4.1 MMOL/L (ref 3.6–5.5)
PROT SERPL-MCNC: 7.1 G/DL (ref 6–8.2)
RBC # BLD AUTO: 4.77 M/UL (ref 4.2–5.4)
RSV RNA SPEC QL NAA+PROBE: NEGATIVE
SARS-COV-2 RNA RESP QL NAA+PROBE: NOTDETECTED
SODIUM SERPL-SCNC: 139 MMOL/L (ref 135–145)
SPECIMEN SOURCE: NORMAL
TROPONIN T SERPL-MCNC: 8 NG/L (ref 6–19)
WBC # BLD AUTO: 4.2 K/UL (ref 4.8–10.8)

## 2024-12-30 PROCEDURE — 93005 ELECTROCARDIOGRAM TRACING: CPT | Mod: TC | Performed by: STUDENT IN AN ORGANIZED HEALTH CARE EDUCATION/TRAINING PROGRAM

## 2024-12-30 PROCEDURE — 84484 ASSAY OF TROPONIN QUANT: CPT

## 2024-12-30 PROCEDURE — 700111 HCHG RX REV CODE 636 W/ 250 OVERRIDE (IP): Performed by: STUDENT IN AN ORGANIZED HEALTH CARE EDUCATION/TRAINING PROGRAM

## 2024-12-30 PROCEDURE — 85025 COMPLETE CBC W/AUTO DIFF WBC: CPT

## 2024-12-30 PROCEDURE — 83690 ASSAY OF LIPASE: CPT

## 2024-12-30 PROCEDURE — 99284 EMERGENCY DEPT VISIT MOD MDM: CPT

## 2024-12-30 PROCEDURE — 0241U HCHG SARS-COV-2 COVID-19 NFCT DS RESP RNA 4 TRGT MIC: CPT

## 2024-12-30 PROCEDURE — 80053 COMPREHEN METABOLIC PANEL: CPT

## 2024-12-30 PROCEDURE — 71045 X-RAY EXAM CHEST 1 VIEW: CPT

## 2024-12-30 PROCEDURE — 36415 COLL VENOUS BLD VENIPUNCTURE: CPT

## 2024-12-30 RX ORDER — ONDANSETRON 4 MG/1
4 TABLET, ORALLY DISINTEGRATING ORAL ONCE
Status: COMPLETED | OUTPATIENT
Start: 2024-12-30 | End: 2024-12-30

## 2024-12-30 RX ORDER — CODEINE PHOSPHATE AND GUAIFENESIN 10; 100 MG/5ML; MG/5ML
5 SOLUTION ORAL EVERY 6 HOURS PRN
Qty: 100 ML | Refills: 0 | Status: SHIPPED | OUTPATIENT
Start: 2024-12-30 | End: 2025-01-04

## 2024-12-30 RX ORDER — ONDANSETRON 4 MG/1
4 TABLET, ORALLY DISINTEGRATING ORAL EVERY 6 HOURS PRN
Qty: 10 TABLET | Refills: 0 | Status: SHIPPED | OUTPATIENT
Start: 2024-12-30

## 2024-12-30 RX ADMIN — ONDANSETRON 4 MG: 4 TABLET, ORALLY DISINTEGRATING ORAL at 01:42

## 2024-12-30 ASSESSMENT — FIBROSIS 4 INDEX: FIB4 SCORE: 1.62

## 2024-12-30 NOTE — ED TRIAGE NOTES
"Chief Complaint   Patient presents with    N/V     Pt reports N/V for 2 days, denies diarrhea, fevers, or blood in emesis. Endorses chills.     Abdominal Pain     For 2 days, epigastric region    Cough     Pt reports productive cough and sore throat since 12/25.      BP (!) 147/92   Pulse 72   Temp 36.6 °C (97.8 °F) (Temporal)   Resp 19   Ht 1.676 m (5' 6\")   Wt 68.8 kg (151 lb 10.8 oz)   SpO2 91%   BMI 24.48 kg/m²   "

## 2024-12-30 NOTE — ED PROVIDER NOTES
ED Provider Note    CHIEF COMPLAINT  Chief Complaint   Patient presents with    N/V     Pt reports N/V for 2 days, denies diarrhea, fevers, or blood in emesis. Endorses chills.     Abdominal Pain     For 2 days, epigastric region    Cough     Pt reports productive cough and sore throat since 12/25.        EXTERNAL RECORDS REVIEWED  Outpatient notes are patient is seen by [facility] due to bursitis of left hip    HPI/ROS  LIMITATION TO HISTORY   Select: : None  OUTSIDE HISTORIAN(S):  Significant other who is at bedside    Lidia Dior is a 69 y.o. female who presents to the emergency department chief complaint of dry, nonproductive cough, mild sore throat, nasal congestion, subjective fevers at home.  Patient states that she has had symptoms since the 25th, has not been out of follow-up with her primary care doctor.  She has been taken over-the-counter medications with minimal relief.  Patient has had positive sick contacts at home,  had similar symptoms but has since improved.  Patient states that she has a history of hypothyroid.  She denies any orthopnea, unilateral bilateral swelling.  Patient states that she is also had a little bit of epigastric abdominal pain, has had some vomiting today.  She denies any diarrhea, she denies any bloody or bilious emesis.  No previous abdominal surgeries.    PAST MEDICAL HISTORY   has a past medical history of Abnormal mammogram (11/3/2021), B12 deficiency (7/16/2018), COVID-19 virus infection (5/2/2023), Current long-term use of postmenopausal hormone replacement therapy (7/16/2018), Dyslipidemia, Elevated fasting blood sugar (10/13/2021), Moderate episode of recurrent major depressive disorder (HCC) (1/10/2023), Other insomnia (1/10/2023), Ovarian cyst (10/2005), Renal cyst (9/215), Renal cyst (10/2008), and Thrombocytopenia (HCC) (3/8/2022).    SURGICAL HISTORY   has a past surgical history that includes hysterectomy laparoscopy (10/1995); other  "orthopedic surgery (Right, 01/26/2001); abbey by laparoscopy (07/31/2003); and other surgical procedure (Left, 10/09/2006).    FAMILY HISTORY  Family History   Problem Relation Age of Onset    Diabetes Mother         Adult onset, on insulin    Arthritis Mother         RA    Heart Disease Father         CHF    Diabetes Sister         borderline    Thyroid Sister         hypothyroid    Diabetes Sister         borderline    Diabetes Sister     Diabetes Sister     Diabetes Sister     Other Son         AV valve repair - possibly genetic    Hypertension Son        SOCIAL HISTORY  Social History     Tobacco Use    Smoking status: Never    Smokeless tobacco: Never   Vaping Use    Vaping status: Never Used   Substance and Sexual Activity    Alcohol use: Yes     Alcohol/week: 0.0 - 0.6 oz     Comment: glass of wine 1-2x /wk    Drug use: No    Sexual activity: Yes     Partners: Male     Birth control/protection: Post-Menopausal       CURRENT MEDICATIONS  Home Medications    **Home medications have not yet been reviewed for this encounter**         ALLERGIES  Allergies   Allergen Reactions    Aspirin Unspecified     Upset stomach     Latex Hives, Itching, Rash and Swelling       PHYSICAL EXAM  VITAL SIGNS: BP (!) 147/92   Pulse 72   Temp 36.6 °C (97.8 °F) (Temporal)   Resp 19   Ht 1.676 m (5' 6\")   Wt 68.8 kg (151 lb 10.8 oz)   SpO2 91%   BMI 24.48 kg/m²    Constitutional: Well developed, Well nourished, No acute distress, Non-toxic appearance.   HENT: Normocephalic, Atraumatic, Bilateral external ears normal, Oropharynx is clear mucous membranes are moist. No oral exudates or nasal discharge.   Eyes: Pupils are equal round and reactive, EOMI, Conjunctiva normal, No discharge.   Neck: Normal range of motion, No tenderness, Supple, No stridor. No meningismus.  Lymphatic: No lymphadenopathy noted.   Cardiovascular: Regular rate and rhythm without murmur rub or gallop.  Thorax & Lungs: Clear breath sounds bilaterally " without wheezes, rhonchi or rales. There is no chest wall tenderness.   Abdomen: Soft mild tenderness palpation the epigastric region, non-distended. There is no rebound or guarding. No organomegaly is appreciated. Bowel sounds are normal.  Skin: Normal without rash.   Back: No CVA or spinal tenderness.   Extremities: Intact distal pulses, No edema, No tenderness, No cyanosis, No clubbing. Capillary refill is less than 2 seconds.  Musculoskeletal: Good range of motion in all major joints. No tenderness to palpation or major deformities noted.   Neurologic: Alert & oriented x 3, Normal motor function, Normal sensory function, No focal deficits noted. Reflexes are normal.  Psychiatric: Affect normal, Judgment normal, Mood normal. There is no suicidal ideation or patient reported hallucinations.       EKG/LABS  Results for orders placed or performed during the hospital encounter of 12/30/24   CBC WITH DIFFERENTIAL    Collection Time: 12/30/24  1:50 AM   Result Value Ref Range    WBC 4.2 (L) 4.8 - 10.8 K/uL    RBC 4.77 4.20 - 5.40 M/uL    Hemoglobin 15.0 12.0 - 16.0 g/dL    Hematocrit 42.8 37.0 - 47.0 %    MCV 89.7 81.4 - 97.8 fL    MCH 31.4 27.0 - 33.0 pg    MCHC 35.0 32.2 - 35.5 g/dL    RDW 38.7 35.9 - 50.0 fL    Platelet Count 256 164 - 446 K/uL    MPV 9.1 9.0 - 12.9 fL    Neutrophils-Polys 47.40 44.00 - 72.00 %    Lymphocytes 36.40 22.00 - 41.00 %    Monocytes 11.00 0.00 - 13.40 %    Eosinophils 4.00 0.00 - 6.90 %    Basophils 1.20 0.00 - 1.80 %    Immature Granulocytes 0.00 0.00 - 0.90 %    Nucleated RBC 0.00 0.00 - 0.20 /100 WBC    Neutrophils (Absolute) 1.99 1.82 - 7.42 K/uL    Lymphs (Absolute) 1.53 1.00 - 4.80 K/uL    Monos (Absolute) 0.46 0.00 - 0.85 K/uL    Eos (Absolute) 0.17 0.00 - 0.51 K/uL    Baso (Absolute) 0.05 0.00 - 0.12 K/uL    Immature Granulocytes (abs) 0.00 0.00 - 0.11 K/uL    NRBC (Absolute) 0.00 K/uL   COMP METABOLIC PANEL    Collection Time: 12/30/24  1:50 AM   Result Value Ref Range    Sodium  139 135 - 145 mmol/L    Potassium 4.1 3.6 - 5.5 mmol/L    Chloride 104 96 - 112 mmol/L    Co2 24 20 - 33 mmol/L    Anion Gap 11.0 7.0 - 16.0    Glucose 110 (H) 65 - 99 mg/dL    Bun 9 8 - 22 mg/dL    Creatinine 0.55 0.50 - 1.40 mg/dL    Calcium 9.2 8.4 - 10.2 mg/dL    Correct Calcium 9.0 8.5 - 10.5 mg/dL    AST(SGOT) 36 12 - 45 U/L    ALT(SGPT) 53 (H) 2 - 50 U/L    Alkaline Phosphatase 107 (H) 30 - 99 U/L    Total Bilirubin 0.5 0.1 - 1.5 mg/dL    Albumin 4.2 3.2 - 4.9 g/dL    Total Protein 7.1 6.0 - 8.2 g/dL    Globulin 2.9 1.9 - 3.5 g/dL    A-G Ratio 1.4 g/dL   LIPASE    Collection Time: 24  1:50 AM   Result Value Ref Range    Lipase 31 11 - 82 U/L   TROPONIN    Collection Time: 24  1:50 AM   Result Value Ref Range    Troponin T 8 6 - 19 ng/L   CoV-2, Flu A/B, And RSV by PCR ([facility])    Collection Time: 24  1:50 AM    Specimen: Respirate   Result Value Ref Range    Influenza virus A RNA Negative Negative    Influenza virus B, PCR Negative Negative    RSV, PCR Negative Negative    SARS-CoV-2 by PCR NotDetected     SARS-CoV-2 Source NP Swab    ESTIMATED GFR    Collection Time: 24  1:50 AM   Result Value Ref Range    GFR (CKD-EPI) 100 >60 mL/min/1.73 m 2   EKG (NOW)    Collection Time: 24  1:56 AM   Result Value Ref Range    Report       [facility] Emergency Dept[facility]    Test Date:  2024  Pt Name:    GINNY OH              Department: [facility]  MRN:        [id number]                      [facility]  Gender:     Female                       Technician: [id number]  :        1955                   Requested By:MEI PATTERSON  Order #:    [id number]                    Reading MD:    Measurements  Intervals                                Axis  Rate:       66                           P:          53  OK:         144                          QRS:        3  QRSD:       121                          T:          14  QT:         403  QTc:         423    Interpretive Statements  Sinus rhythm  Nonspecific intraventricular conduction delay  No previous ECG available for comparison       *Note: Due to a large number of results and/or encounters for the requested time period, some results have not been displayed. A complete set of results can be found in Results Review.       I have independently interpreted this EKG    RADIOLOGY/PROCEDURES   I have independently interpreted the diagnostic imaging associated with this visit and am waiting the final reading from the radiologist.   My preliminary interpretation is as follows: No evidence of pneumothorax, pneumonia or other consolidative process    Radiologist interpretation:  DX-CHEST-PORTABLE (1 VIEW)   Final Result      No acute cardiopulmonary abnormality.          COURSE & MEDICAL DECISION MAKING    ASSESSMENT, COURSE AND PLAN  Care Narrative: Patient is 69-year-old female presenting to the emergency department with upper respiratory symptoms, nonproductive cough, mild sore throat, nasal congestion.  She is also having a little bit of epigastric abdominal pain, has had some nausea and nonbloody/nonbilious emesis today.  She denies any other abdominal pain or diarrhea.  Has had positive sick contacts at home.  Patient arrived at this hemodynamically stable, no respiratory distress.  Heart and lung sounds normal on exam.  She did have some mild epigastric abdominal tenderness to palpation.  Will give antiemetic here, due to normal vital signs will not resuscitate with IV fluids at this time.  Will obtain EKG, CBC, CMP as well as troponin and viral studies.  Will also get a chest x-ray to evaluate for any pneumonia or focal consolidative process.    Patient's labs reviewed patient does have some mild leukopenia, no significant neutropenia.  Metabolic panel shows mildly elevated ALT and alk phos, similar to previous.  COVID studies, influenza, RSV were negative.  Troponin negative, lipase negative.    EKG did not  show any evidence of ischemic changes.    Chest x-ray showed no evidence of focal consolidation or pneumonia.    Patient reassessed, remained hemodynamically stable, no respiratory distress, saturating well on room air.  Patient had normal vital signs, was not significantly tachycardic.  She was tolerating orals here.  Differential diagnose considered.  Suspect likely self resolving illness, viral illness.  Doubt acute emergent pathology.  Patient will be discharged home with symptomatic medications, given strict return precautions and anticipatory guidance they understood at time of discharge.            ADDITIONAL PROBLEMS MANAGED  none    DISPOSITION AND DISCUSSIONS  I have discussed management of the patient with the following physicians and ALLYN's:  none    Discussion of management with other Q or appropriate source(s): None     Escalation of care considered, and ultimately not performed:Laboratory analysis and diagnostic imaging    Barriers to care at this time, including but not limited to:  none .     FINAL DIAGNOSIS  1. Viral URI with cough Acute   2. Nausea and vomiting, unspecified vomiting type Acute        Electronically signed by: Cristobal Hinojosa M.D., 12/30/2024 1:55 AM

## 2025-02-24 ENCOUNTER — HOSPITAL ENCOUNTER (OUTPATIENT)
Facility: MEDICAL CENTER | Age: 70
End: 2025-02-24
Attending: STUDENT IN AN ORGANIZED HEALTH CARE EDUCATION/TRAINING PROGRAM
Payer: MEDICARE

## 2025-02-24 ENCOUNTER — OFFICE VISIT (OUTPATIENT)
Dept: MEDICAL GROUP | Facility: PHYSICIAN GROUP | Age: 70
End: 2025-02-24
Payer: MEDICARE

## 2025-02-24 VITALS
SYSTOLIC BLOOD PRESSURE: 120 MMHG | BODY MASS INDEX: 24.36 KG/M2 | WEIGHT: 151.6 LBS | TEMPERATURE: 97.1 F | HEART RATE: 75 BPM | OXYGEN SATURATION: 99 % | RESPIRATION RATE: 16 BRPM | DIASTOLIC BLOOD PRESSURE: 60 MMHG | HEIGHT: 66 IN

## 2025-02-24 DIAGNOSIS — K21.9 GASTROESOPHAGEAL REFLUX DISEASE, UNSPECIFIED WHETHER ESOPHAGITIS PRESENT: ICD-10-CM

## 2025-02-24 DIAGNOSIS — E78.5 DYSLIPIDEMIA: ICD-10-CM

## 2025-02-24 DIAGNOSIS — E03.9 HYPOTHYROIDISM, UNSPECIFIED TYPE: ICD-10-CM

## 2025-02-24 DIAGNOSIS — E53.8 B12 DEFICIENCY: ICD-10-CM

## 2025-02-24 DIAGNOSIS — F32.5 MAJOR DEPRESSIVE DISORDER WITH SINGLE EPISODE, IN FULL REMISSION (HCC): ICD-10-CM

## 2025-02-24 DIAGNOSIS — J30.89 NON-SEASONAL ALLERGIC RHINITIS, UNSPECIFIED TRIGGER: ICD-10-CM

## 2025-02-24 DIAGNOSIS — E55.9 VITAMIN D DEFICIENCY: ICD-10-CM

## 2025-02-24 DIAGNOSIS — R05.3 CHRONIC COUGH: ICD-10-CM

## 2025-02-24 LAB
ALBUMIN SERPL BCP-MCNC: 4.3 G/DL (ref 3.2–4.9)
ALBUMIN/GLOB SERPL: 1.7 G/DL
ALP SERPL-CCNC: 99 U/L (ref 30–99)
ALT SERPL-CCNC: 20 U/L (ref 2–50)
ANION GAP SERPL CALC-SCNC: 8 MMOL/L (ref 7–16)
AST SERPL-CCNC: 21 U/L (ref 12–45)
BASOPHILS # BLD AUTO: 1.4 % (ref 0–1.8)
BASOPHILS # BLD: 0.05 K/UL (ref 0–0.12)
BILIRUB SERPL-MCNC: 0.6 MG/DL (ref 0.1–1.5)
BUN SERPL-MCNC: 13 MG/DL (ref 8–22)
CALCIUM ALBUM COR SERPL-MCNC: 9 MG/DL (ref 8.5–10.5)
CALCIUM SERPL-MCNC: 9.2 MG/DL (ref 8.4–10.2)
CHLORIDE SERPL-SCNC: 105 MMOL/L (ref 96–112)
CHOLEST SERPL-MCNC: 206 MG/DL (ref 100–199)
CO2 SERPL-SCNC: 27 MMOL/L (ref 20–33)
CREAT SERPL-MCNC: 0.71 MG/DL (ref 0.5–1.4)
EOSINOPHIL # BLD AUTO: 0.09 K/UL (ref 0–0.51)
EOSINOPHIL NFR BLD: 2.6 % (ref 0–6.9)
ERYTHROCYTE [DISTWIDTH] IN BLOOD BY AUTOMATED COUNT: 39.8 FL (ref 35.9–50)
FASTING STATUS PATIENT QL REPORTED: NORMAL
GFR SERPLBLD CREATININE-BSD FMLA CKD-EPI: 92 ML/MIN/1.73 M 2
GLOBULIN SER CALC-MCNC: 2.6 G/DL (ref 1.9–3.5)
GLUCOSE SERPL-MCNC: 97 MG/DL (ref 65–99)
HCT VFR BLD AUTO: 44 % (ref 37–47)
HDLC SERPL-MCNC: 43 MG/DL
HGB BLD-MCNC: 15.2 G/DL (ref 12–16)
IMM GRANULOCYTES # BLD AUTO: 0.01 K/UL (ref 0–0.11)
IMM GRANULOCYTES NFR BLD AUTO: 0.3 % (ref 0–0.9)
LDLC SERPL CALC-MCNC: 123 MG/DL
LYMPHOCYTES # BLD AUTO: 1.01 K/UL (ref 1–4.8)
LYMPHOCYTES NFR BLD: 29 % (ref 22–41)
MCH RBC QN AUTO: 31.6 PG (ref 27–33)
MCHC RBC AUTO-ENTMCNC: 34.5 G/DL (ref 32.2–35.5)
MCV RBC AUTO: 91.5 FL (ref 81.4–97.8)
MONOCYTES # BLD AUTO: 0.34 K/UL (ref 0–0.85)
MONOCYTES NFR BLD AUTO: 9.8 % (ref 0–13.4)
NEUTROPHILS # BLD AUTO: 1.98 K/UL (ref 1.82–7.42)
NEUTROPHILS NFR BLD: 56.9 % (ref 44–72)
NRBC # BLD AUTO: 0 K/UL
NRBC BLD-RTO: 0 /100 WBC (ref 0–0.2)
PLATELET # BLD AUTO: 264 K/UL (ref 164–446)
PMV BLD AUTO: 9.9 FL (ref 9–12.9)
POTASSIUM SERPL-SCNC: 4.4 MMOL/L (ref 3.6–5.5)
PROT SERPL-MCNC: 6.9 G/DL (ref 6–8.2)
RBC # BLD AUTO: 4.81 M/UL (ref 4.2–5.4)
SODIUM SERPL-SCNC: 140 MMOL/L (ref 135–145)
T4 FREE SERPL-MCNC: 1.25 NG/DL (ref 0.93–1.7)
TRIGL SERPL-MCNC: 200 MG/DL (ref 0–149)
TSH SERPL DL<=0.005 MIU/L-ACNC: 0.1 UIU/ML (ref 0.38–5.33)
WBC # BLD AUTO: 3.5 K/UL (ref 4.8–10.8)

## 2025-02-24 PROCEDURE — 84443 ASSAY THYROID STIM HORMONE: CPT

## 2025-02-24 PROCEDURE — 3074F SYST BP LT 130 MM HG: CPT | Performed by: INTERNAL MEDICINE

## 2025-02-24 PROCEDURE — 80053 COMPREHEN METABOLIC PANEL: CPT

## 2025-02-24 PROCEDURE — 85025 COMPLETE CBC W/AUTO DIFF WBC: CPT

## 2025-02-24 PROCEDURE — 3078F DIAST BP <80 MM HG: CPT | Performed by: INTERNAL MEDICINE

## 2025-02-24 PROCEDURE — 82607 VITAMIN B-12: CPT

## 2025-02-24 PROCEDURE — 82306 VITAMIN D 25 HYDROXY: CPT

## 2025-02-24 PROCEDURE — 36415 COLL VENOUS BLD VENIPUNCTURE: CPT

## 2025-02-24 PROCEDURE — 84439 ASSAY OF FREE THYROXINE: CPT

## 2025-02-24 PROCEDURE — 80061 LIPID PANEL: CPT

## 2025-02-24 PROCEDURE — 99214 OFFICE O/P EST MOD 30 MIN: CPT | Performed by: INTERNAL MEDICINE

## 2025-02-24 RX ORDER — OMEPRAZOLE 20 MG/1
20 CAPSULE, DELAYED RELEASE ORAL 2 TIMES DAILY
Qty: 60 CAPSULE | Refills: 0 | Status: SHIPPED | OUTPATIENT
Start: 2025-02-24 | End: 2025-03-26

## 2025-02-24 RX ORDER — BENZONATATE 100 MG/1
100 CAPSULE ORAL 3 TIMES DAILY PRN
Qty: 60 CAPSULE | Refills: 1 | Status: SHIPPED | OUTPATIENT
Start: 2025-02-24

## 2025-02-24 ASSESSMENT — PATIENT HEALTH QUESTIONNAIRE - PHQ9: CLINICAL INTERPRETATION OF PHQ2 SCORE: 0

## 2025-02-24 ASSESSMENT — FIBROSIS 4 INDEX: FIB4 SCORE: 1.27

## 2025-02-25 ENCOUNTER — RESULTS FOLLOW-UP (OUTPATIENT)
Dept: MEDICAL GROUP | Facility: PHYSICIAN GROUP | Age: 70
End: 2025-02-25

## 2025-02-25 LAB
25(OH)D3 SERPL-MCNC: 37 NG/ML (ref 30–100)
VIT B12 SERPL-MCNC: 633 PG/ML (ref 211–911)

## 2025-02-25 NOTE — PROGRESS NOTES
Subjective:   Chief Complaint/History of Present Illness:  Lidia Dior is a 69 y.o. female established patient who presents today to discuss medical problems as listed below. Lidia is unaccompanied for today's visit.    History of Present Illness  The patient presents for evaluation of a persistent cough and gastrointestinal issues.    She reports a persistent cough that began in December, which she initially attributed to influenza. The cough subsided after approximately 4 weeks but recurred around the first week of February. The cough is described as dry, with a sensation of something lodged in her throat, particularly on one side. She also experiences a tickling sensation and a lack of moisture or saliva during coughing episodes. Following these episodes, she notes the presence of thick mucus or saliva in her throat, which she attempts to thin by drinking water. The cough is not productive, and she has not observed any green or yellow phlegm. She reports no changes in medication, diet, or supplements that could have triggered the cough. She has not undergone an upper endoscopy and does not experience dysphagia. She reports no history of asthma or reactive airway disease. She was prescribed codeine cough syrup during her ER visit. The cough can occur at any time but tends to worsen at night, leading to an episode of vomiting due to uncontrollable coughing on a recent Saturday night.    She has been experiencing gastrointestinal issues concurrently with her cough. She reports occasional changes in stool consistency, alternating between normal and small, fragmented stools. She has not undergone an upper endoscopy and does not experience dysphagia. She has discontinued meloxicam approximately 3 weeks ago and has never taken Pepcid or Prilosec. She expresses interest in undergoing a GI test.    She has a history of sinus issues, which she manages with a neti pot and massage. She reports no significant  "changes in her sinuses and does not believe she has postnasal drip. She experiences constant belching, which she attributes to swallowing air while trying to clear her throat. She has previously used Flonase and Afrin but prefers to avoid nasal sprays.    FAMILY HISTORY  Her sister has GERD.    ALLERGIES  The patient is allergic to DUST.    MEDICATIONS  Discontinued: Meloxicam  Past: Flonase, Afrin     Current Medications:  Current Outpatient Medications Ordered in Epic   Medication Sig Dispense Refill    omeprazole (PRILOSEC) 20 MG delayed-release capsule Take 1 Capsule by mouth 2 times a day for 30 days. 60 Capsule 0    benzonatate (TESSALON) 100 MG Cap Take 1 Capsule by mouth 3 times a day as needed for Cough. 60 Capsule 1    escitalopram (LEXAPRO) 20 MG tablet Take 1 Tablet by mouth every day. 100 Tablet 3    thyroid (ARMOUR THYROID) 90 MG Tab Take 1 Tablet by mouth every morning on an empty stomach. 100 Tablet 3    triamcinolone acetonide (KENALOG) 0.1 % Cream Apply 1 Application topically 2 times a day.      Probiotic Product (PROBIOTIC PO)       Multiple Vitamins-Minerals (HAIR SKIN AND NAILS FORMULA PO)       Cholecalciferol (VITAMIN D-3) 5000 units Tab Take  by mouth.      B Complex Vitamins (B COMPLEX PO) Take  by mouth.      CALCIUM-MAGNESIUM-ZINC PO Take  by mouth.       No current Epic-ordered facility-administered medications on file.          Objective:   Physical Exam:    Vitals: /60 (BP Location: Right arm, Patient Position: Sitting, BP Cuff Size: Adult)   Pulse 75   Temp 36.2 °C (97.1 °F) (Temporal)   Resp 16   Ht 1.676 m (5' 6\")   Wt 68.8 kg (151 lb 9.6 oz)   SpO2 99%    BMI: Body mass index is 24.47 kg/m².  Physical Exam  Constitutional:       General: She is not in acute distress.     Appearance: Normal appearance. She is not ill-appearing.   HENT:      Right Ear: External ear normal.      Left Ear: External ear normal.   Eyes:      General: No scleral icterus.     " Conjunctiva/sclera: Conjunctivae normal.   Cardiovascular:      Rate and Rhythm: Normal rate and regular rhythm.      Pulses: Normal pulses.      Heart sounds: No murmur heard.  Pulmonary:      Effort: Pulmonary effort is normal. No respiratory distress.      Breath sounds: No wheezing or rhonchi.      Comments: Throat clearing, dry cough  Abdominal:      General: Bowel sounds are normal.      Palpations: Abdomen is soft.   Skin:     General: Skin is warm and dry.      Findings: No rash.   Neurological:      Gait: Gait normal.   Psychiatric:         Mood and Affect: Mood normal.         Behavior: Behavior normal.         Thought Content: Thought content normal.         Judgment: Judgment normal.         Depression Screening    Little interest or pleasure in doing things?  0 - not at all  Feeling down, depressed , or hopeless? 0 - not at all  Patient Health Questionnaire Score: 0      Results  Laboratory Studies  Liver enzymes were abnormal in December. TSH was abnormal. White blood cell count was low.    Assessment & Plan  1. Chronic Cough.  A chronic cough is often explained by under-treated heartburn, worsening postnasal drip, or cough variant asthma or a combination of them. Symptoms suggest heartburn or reflux exacerbated by meloxicam, which was discontinued 3 weeks ago. A prescription for Prilosec 20 mg twice daily for 4 weeks will be provided. If symptoms improve, the dosage will be reduced to once daily in 2 weeks time. A prescription for Tessalon Perles will also be provided to manage the cough reflex. She is advised to update on her condition in 1.5 to 2 weeks. If symptoms persist, she will schedule a visit with a gastroenterologist for consideration of an upper endoscopy or upper GI series pending improvement. Can also elevate head of bed to help manage this issue.    2. Gastroesophageal Reflux Disease (GERD).  Symptoms suggest GERD, contributing to the chronic cough and gastrointestinal discomfort.  Prilosec 20 mg twice daily for 4 weeks will be prescribed. If symptoms improve, the dosage will be reduced to once daily in 2 weeks time. She is advised to avoid foods that may trigger heartburn and to maintain a simple, bland diet. Due to abrupt onset of symptoms a referral to a gastroenterologist for further evaluation, including a possible upper endoscopy, will be made. Saw GI consultants in the past for last colonoscopy in 2020.    3. Vasomotor Rhinitis.  The patient has known triggers for nasal congestion and drainage, such as dust and temperature changes. She uses a neti pot and massages her maxillary area to manage symptoms. She is advised to continue these measures and consider using over-the-counter antihistamine nasal sprays like Flonase or Nasacort if needed, not much improvement in the past. If symptoms become more bothersome, a prescription for a nasal spray like Azelastine or Ipratropium may be considered. Could also consider ENT referral at that time or imaging of the sinuses.     4. Major depressive disorder with single episode, in full remission  Chronic and ongoing, mood remains stable, continue escitalopram 20 mg daily.    PROCEDURE  Colonoscopy was performed in December 2020.      Assessment and Plan:   Lidia is a 69 y.o. female with the following:  Problem List Items Addressed This Visit    None  Visit Diagnoses         Chronic cough        Relevant Medications    benzonatate (TESSALON) 100 MG Cap      Gastroesophageal reflux disease, unspecified whether esophagitis present        Relevant Medications    omeprazole (PRILOSEC) 20 MG delayed-release capsule    Other Relevant Orders    Referral to Gastroenterology               RTC: Return in about 2 months (around 4/24/2025).    I spent a total of 36 minutes with record review, exam, communication with the patient, communication with other providers, and documentation of this encounter.    Verbal consent was acquired by the patient to use [employer] ambient listening note generation during this visit Yes       PLEASE NOTE: This dictation was created using voice recognition software. I have made every reasonable attempt to correct obvious errors, but I expect that there are errors of grammar and possibly content that I did not discover before finalizing the note.      Lesa Garcia, DO  Geriatric and Internal Medicine  [facility]

## 2025-02-26 NOTE — TELEPHONE ENCOUNTER
1. Caller Name: Lidia Dior                          Call Back Number: [phone] (home)         How would the patient prefer to be contacted with a response: Phone call OK to leave a detailed message    Called patient     Lab results notable for mild reduction of white blood cells, mild elevation of cholesterol, and low running TSH. These are all fairly stable findings and no other new concerns at this time.     Has she noted any improved with the prilosec/omeprazole and tessalon pearles?     Patient picked up until late evening no notable difference yet        Reports not getting great sleep  feels very tired no energy

## 2025-04-29 ENCOUNTER — APPOINTMENT (OUTPATIENT)
Dept: MEDICAL GROUP | Facility: PHYSICIAN GROUP | Age: 70
End: 2025-04-29
Payer: MEDICARE

## 2025-06-30 DIAGNOSIS — E03.9 HYPOTHYROIDISM, UNSPECIFIED TYPE: ICD-10-CM

## 2025-06-30 RX ORDER — THYROID,PORK 90 MG
90 TABLET ORAL
Qty: 100 TABLET | Refills: 3 | Status: SHIPPED | OUTPATIENT
Start: 2025-06-30

## 2025-08-14 ENCOUNTER — HOSPITAL ENCOUNTER (OUTPATIENT)
Dept: RADIOLOGY | Facility: MEDICAL CENTER | Age: 70
End: 2025-08-14
Payer: MEDICARE

## 2025-08-14 DIAGNOSIS — R05.3 CHRONIC COUGH: ICD-10-CM

## 2025-08-14 PROCEDURE — 74221 X-RAY XM ESOPHAGUS 2CNTRST: CPT

## 2025-08-14 PROCEDURE — 700117 HCHG RX CONTRAST REV CODE 255

## 2025-08-14 PROCEDURE — 700112 HCHG RX REV CODE 229

## 2025-08-14 PROCEDURE — A9270 NON-COVERED ITEM OR SERVICE: HCPCS

## 2025-08-14 RX ADMIN — BARIUM SULFATE 700 MG: 700 TABLET ORAL at 14:35

## 2025-08-14 RX ADMIN — ANTACID/ANTIFLATULENT 1 PACKET: 380; 550; 10; 10 GRANULE, EFFERVESCENT ORAL at 14:35

## 2025-08-18 ENCOUNTER — HOSPITAL ENCOUNTER (OUTPATIENT)
Dept: RADIOLOGY | Facility: MEDICAL CENTER | Age: 70
End: 2025-08-18
Attending: INTERNAL MEDICINE
Payer: MEDICARE

## 2025-08-18 VITALS — BODY MASS INDEX: 23.95 KG/M2 | HEIGHT: 66 IN | WEIGHT: 149 LBS

## 2025-08-18 DIAGNOSIS — Z12.31 VISIT FOR SCREENING MAMMOGRAM: ICD-10-CM

## 2025-08-18 PROCEDURE — 77067 SCR MAMMO BI INCL CAD: CPT

## 2025-08-18 ASSESSMENT — FIBROSIS 4 INDEX: FIB4 SCORE: 1.21
